# Patient Record
Sex: FEMALE | Race: BLACK OR AFRICAN AMERICAN | Employment: OTHER | URBAN - METROPOLITAN AREA
[De-identification: names, ages, dates, MRNs, and addresses within clinical notes are randomized per-mention and may not be internally consistent; named-entity substitution may affect disease eponyms.]

---

## 2018-07-13 ENCOUNTER — HOSPITAL ENCOUNTER (OUTPATIENT)
Dept: GENERAL RADIOLOGY | Age: 68
Discharge: HOME | End: 2018-07-13
Attending: FAMILY MEDICINE
Payer: MEDICARE

## 2018-07-13 DIAGNOSIS — R05.9 COUGH: ICD-10-CM

## 2018-07-13 PROCEDURE — 71046 X-RAY EXAM CHEST 2 VIEWS: CPT

## 2020-04-14 ENCOUNTER — OFFICE VISIT (OUTPATIENT)
Dept: ONCOLOGY | Age: 70
End: 2020-04-14

## 2020-04-14 ENCOUNTER — DOCUMENTATION ONLY (OUTPATIENT)
Dept: ONCOLOGY | Age: 70
End: 2020-04-14

## 2020-04-14 VITALS
TEMPERATURE: 95.7 F | DIASTOLIC BLOOD PRESSURE: 69 MMHG | HEIGHT: 67 IN | OXYGEN SATURATION: 97 % | SYSTOLIC BLOOD PRESSURE: 127 MMHG | RESPIRATION RATE: 18 BRPM | HEART RATE: 92 BPM | WEIGHT: 231.4 LBS | BODY MASS INDEX: 36.32 KG/M2

## 2020-04-14 DIAGNOSIS — Z17.0 BILATERAL MALIGNANT NEOPLASM OF BREAST IN FEMALE, ESTROGEN RECEPTOR POSITIVE, UNSPECIFIED SITE OF BREAST (HCC): Primary | ICD-10-CM

## 2020-04-14 DIAGNOSIS — Z01.810 ENCOUNTER FOR PREPROCEDURAL CARDIOVASCULAR EXAMINATION: ICD-10-CM

## 2020-04-14 DIAGNOSIS — C50.912 BILATERAL MALIGNANT NEOPLASM OF BREAST IN FEMALE, ESTROGEN RECEPTOR POSITIVE, UNSPECIFIED SITE OF BREAST (HCC): Primary | ICD-10-CM

## 2020-04-14 DIAGNOSIS — C50.911 BILATERAL MALIGNANT NEOPLASM OF BREAST IN FEMALE, ESTROGEN RECEPTOR POSITIVE, UNSPECIFIED SITE OF BREAST (HCC): Primary | ICD-10-CM

## 2020-04-14 RX ORDER — ONDANSETRON 2 MG/ML
8 INJECTION INTRAMUSCULAR; INTRAVENOUS AS NEEDED
Status: CANCELLED | OUTPATIENT
Start: 2020-06-16

## 2020-04-14 RX ORDER — HEPARIN 100 UNIT/ML
300-500 SYRINGE INTRAVENOUS AS NEEDED
Status: CANCELLED
Start: 2020-06-02

## 2020-04-14 RX ORDER — DIPHENHYDRAMINE HYDROCHLORIDE 50 MG/ML
50 INJECTION, SOLUTION INTRAMUSCULAR; INTRAVENOUS AS NEEDED
Status: CANCELLED
Start: 2020-05-12

## 2020-04-14 RX ORDER — LIDOCAINE AND PRILOCAINE 25; 25 MG/G; MG/G
CREAM TOPICAL AS NEEDED
Qty: 30 G | Refills: 0 | Status: SHIPPED | OUTPATIENT
Start: 2020-04-14 | End: 2022-04-19

## 2020-04-14 RX ORDER — ONDANSETRON 2 MG/ML
8 INJECTION INTRAMUSCULAR; INTRAVENOUS AS NEEDED
Status: CANCELLED | OUTPATIENT
Start: 2020-07-07

## 2020-04-14 RX ORDER — ALBUTEROL SULFATE 0.83 MG/ML
2.5 SOLUTION RESPIRATORY (INHALATION) AS NEEDED
Status: CANCELLED
Start: 2020-05-12

## 2020-04-14 RX ORDER — SODIUM CHLORIDE 9 MG/ML
10 INJECTION INTRAMUSCULAR; INTRAVENOUS; SUBCUTANEOUS AS NEEDED
Status: CANCELLED | OUTPATIENT
Start: 2020-06-09

## 2020-04-14 RX ORDER — ACETAMINOPHEN 325 MG/1
650 TABLET ORAL AS NEEDED
Status: CANCELLED
Start: 2020-06-09

## 2020-04-14 RX ORDER — ALBUTEROL SULFATE 0.83 MG/ML
2.5 SOLUTION RESPIRATORY (INHALATION) AS NEEDED
Status: CANCELLED
Start: 2020-07-07

## 2020-04-14 RX ORDER — SODIUM CHLORIDE 9 MG/ML
10 INJECTION INTRAMUSCULAR; INTRAVENOUS; SUBCUTANEOUS AS NEEDED
Status: CANCELLED | OUTPATIENT
Start: 2020-04-21

## 2020-04-14 RX ORDER — EPINEPHRINE 1 MG/ML
0.3 INJECTION, SOLUTION, CONCENTRATE INTRAVENOUS AS NEEDED
Status: CANCELLED | OUTPATIENT
Start: 2020-06-09

## 2020-04-14 RX ORDER — SODIUM CHLORIDE 0.9 % (FLUSH) 0.9 %
10 SYRINGE (ML) INJECTION AS NEEDED
Status: CANCELLED
Start: 2020-05-05

## 2020-04-14 RX ORDER — DIPHENHYDRAMINE HYDROCHLORIDE 50 MG/ML
50 INJECTION, SOLUTION INTRAMUSCULAR; INTRAVENOUS AS NEEDED
Status: CANCELLED
Start: 2020-05-26

## 2020-04-14 RX ORDER — DEXAMETHASONE SODIUM PHOSPHATE 4 MG/ML
10 INJECTION, SOLUTION INTRA-ARTICULAR; INTRALESIONAL; INTRAMUSCULAR; INTRAVENOUS; SOFT TISSUE ONCE
Status: CANCELLED | OUTPATIENT
Start: 2020-05-26

## 2020-04-14 RX ORDER — DEXAMETHASONE SODIUM PHOSPHATE 4 MG/ML
10 INJECTION, SOLUTION INTRA-ARTICULAR; INTRALESIONAL; INTRAMUSCULAR; INTRAVENOUS; SOFT TISSUE ONCE
Status: CANCELLED | OUTPATIENT
Start: 2020-05-05

## 2020-04-14 RX ORDER — HYDROCORTISONE SODIUM SUCCINATE 100 MG/2ML
100 INJECTION, POWDER, FOR SOLUTION INTRAMUSCULAR; INTRAVENOUS AS NEEDED
Status: CANCELLED | OUTPATIENT
Start: 2020-05-19

## 2020-04-14 RX ORDER — DIPHENHYDRAMINE HYDROCHLORIDE 50 MG/ML
50 INJECTION, SOLUTION INTRAMUSCULAR; INTRAVENOUS ONCE
Status: CANCELLED
Start: 2020-06-23

## 2020-04-14 RX ORDER — ACETAMINOPHEN 325 MG/1
650 TABLET ORAL AS NEEDED
Status: CANCELLED
Start: 2020-05-19

## 2020-04-14 RX ORDER — HEPARIN 100 UNIT/ML
300-500 SYRINGE INTRAVENOUS AS NEEDED
Status: CANCELLED
Start: 2020-05-12

## 2020-04-14 RX ORDER — DIPHENHYDRAMINE HYDROCHLORIDE 50 MG/ML
50 INJECTION, SOLUTION INTRAMUSCULAR; INTRAVENOUS ONCE
Status: CANCELLED
Start: 2020-04-21

## 2020-04-14 RX ORDER — ONDANSETRON HYDROCHLORIDE 8 MG/1
8 TABLET, FILM COATED ORAL
Qty: 24 TAB | Refills: 3 | Status: SHIPPED | OUTPATIENT
Start: 2020-04-14 | End: 2020-09-15

## 2020-04-14 RX ORDER — EPINEPHRINE 1 MG/ML
0.3 INJECTION, SOLUTION, CONCENTRATE INTRAVENOUS AS NEEDED
Status: CANCELLED | OUTPATIENT
Start: 2020-04-28

## 2020-04-14 RX ORDER — HEPARIN 100 UNIT/ML
300-500 SYRINGE INTRAVENOUS AS NEEDED
Status: CANCELLED
Start: 2020-06-30

## 2020-04-14 RX ORDER — ALBUTEROL SULFATE 0.83 MG/ML
2.5 SOLUTION RESPIRATORY (INHALATION) AS NEEDED
Status: CANCELLED
Start: 2020-06-09

## 2020-04-14 RX ORDER — EPINEPHRINE 1 MG/ML
0.3 INJECTION, SOLUTION, CONCENTRATE INTRAVENOUS AS NEEDED
Status: CANCELLED | OUTPATIENT
Start: 2020-04-21

## 2020-04-14 RX ORDER — SODIUM CHLORIDE 0.9 % (FLUSH) 0.9 %
10 SYRINGE (ML) INJECTION AS NEEDED
Status: CANCELLED
Start: 2020-06-23

## 2020-04-14 RX ORDER — DIPHENHYDRAMINE HYDROCHLORIDE 50 MG/ML
50 INJECTION, SOLUTION INTRAMUSCULAR; INTRAVENOUS ONCE
Status: CANCELLED
Start: 2020-05-26

## 2020-04-14 RX ORDER — DIPHENHYDRAMINE HYDROCHLORIDE 50 MG/ML
25 INJECTION, SOLUTION INTRAMUSCULAR; INTRAVENOUS AS NEEDED
Status: CANCELLED
Start: 2020-06-23

## 2020-04-14 RX ORDER — SODIUM CHLORIDE 0.9 % (FLUSH) 0.9 %
10 SYRINGE (ML) INJECTION AS NEEDED
Status: CANCELLED
Start: 2020-06-16

## 2020-04-14 RX ORDER — ALBUTEROL SULFATE 0.83 MG/ML
2.5 SOLUTION RESPIRATORY (INHALATION) AS NEEDED
Status: CANCELLED
Start: 2020-05-26

## 2020-04-14 RX ORDER — DIPHENHYDRAMINE HYDROCHLORIDE 50 MG/ML
50 INJECTION, SOLUTION INTRAMUSCULAR; INTRAVENOUS AS NEEDED
Status: CANCELLED
Start: 2020-04-28

## 2020-04-14 RX ORDER — ONDANSETRON 2 MG/ML
8 INJECTION INTRAMUSCULAR; INTRAVENOUS AS NEEDED
Status: CANCELLED | OUTPATIENT
Start: 2020-06-23

## 2020-04-14 RX ORDER — DEXAMETHASONE SODIUM PHOSPHATE 4 MG/ML
10 INJECTION, SOLUTION INTRA-ARTICULAR; INTRALESIONAL; INTRAMUSCULAR; INTRAVENOUS; SOFT TISSUE ONCE
Status: CANCELLED | OUTPATIENT
Start: 2020-05-19

## 2020-04-14 RX ORDER — HEPARIN 100 UNIT/ML
300-500 SYRINGE INTRAVENOUS AS NEEDED
Status: CANCELLED
Start: 2020-05-26

## 2020-04-14 RX ORDER — DEXAMETHASONE SODIUM PHOSPHATE 4 MG/ML
10 INJECTION, SOLUTION INTRA-ARTICULAR; INTRALESIONAL; INTRAMUSCULAR; INTRAVENOUS; SOFT TISSUE ONCE
Status: CANCELLED | OUTPATIENT
Start: 2020-04-28

## 2020-04-14 RX ORDER — DEXAMETHASONE SODIUM PHOSPHATE 4 MG/ML
10 INJECTION, SOLUTION INTRA-ARTICULAR; INTRALESIONAL; INTRAMUSCULAR; INTRAVENOUS; SOFT TISSUE ONCE
Status: CANCELLED | OUTPATIENT
Start: 2020-06-16

## 2020-04-14 RX ORDER — EPINEPHRINE 1 MG/ML
0.3 INJECTION, SOLUTION, CONCENTRATE INTRAVENOUS AS NEEDED
Status: CANCELLED | OUTPATIENT
Start: 2020-06-30

## 2020-04-14 RX ORDER — DIPHENHYDRAMINE HYDROCHLORIDE 50 MG/ML
50 INJECTION, SOLUTION INTRAMUSCULAR; INTRAVENOUS AS NEEDED
Status: CANCELLED
Start: 2020-05-19

## 2020-04-14 RX ORDER — SODIUM CHLORIDE 9 MG/ML
10 INJECTION INTRAMUSCULAR; INTRAVENOUS; SUBCUTANEOUS AS NEEDED
Status: CANCELLED | OUTPATIENT
Start: 2020-05-05

## 2020-04-14 RX ORDER — DIPHENHYDRAMINE HYDROCHLORIDE 50 MG/ML
25 INJECTION, SOLUTION INTRAMUSCULAR; INTRAVENOUS AS NEEDED
Status: CANCELLED
Start: 2020-06-09

## 2020-04-14 RX ORDER — DIPHENHYDRAMINE HYDROCHLORIDE 50 MG/ML
50 INJECTION, SOLUTION INTRAMUSCULAR; INTRAVENOUS AS NEEDED
Status: CANCELLED
Start: 2020-05-05

## 2020-04-14 RX ORDER — SODIUM CHLORIDE 9 MG/ML
10 INJECTION INTRAMUSCULAR; INTRAVENOUS; SUBCUTANEOUS AS NEEDED
Status: CANCELLED | OUTPATIENT
Start: 2020-07-07

## 2020-04-14 RX ORDER — ACETAMINOPHEN 325 MG/1
650 TABLET ORAL AS NEEDED
Status: CANCELLED
Start: 2020-06-16

## 2020-04-14 RX ORDER — PROCHLORPERAZINE MALEATE 10 MG
10 TABLET ORAL
Qty: 50 TAB | Refills: 5 | Status: SHIPPED | OUTPATIENT
Start: 2020-04-14 | End: 2020-09-15

## 2020-04-14 RX ORDER — OMEPRAZOLE 20 MG/1
CAPSULE, DELAYED RELEASE ORAL
COMMUNITY
Start: 2020-02-21

## 2020-04-14 RX ORDER — ACETAMINOPHEN 325 MG/1
650 TABLET ORAL AS NEEDED
Status: CANCELLED
Start: 2020-07-07

## 2020-04-14 RX ORDER — ALBUTEROL SULFATE 0.83 MG/ML
2.5 SOLUTION RESPIRATORY (INHALATION) AS NEEDED
Status: CANCELLED
Start: 2020-04-28

## 2020-04-14 RX ORDER — ACETAMINOPHEN 325 MG/1
650 TABLET ORAL AS NEEDED
Status: CANCELLED
Start: 2020-06-02

## 2020-04-14 RX ORDER — DIPHENHYDRAMINE HYDROCHLORIDE 50 MG/ML
50 INJECTION, SOLUTION INTRAMUSCULAR; INTRAVENOUS AS NEEDED
Status: CANCELLED
Start: 2020-06-02

## 2020-04-14 RX ORDER — EPINEPHRINE 1 MG/ML
0.3 INJECTION, SOLUTION, CONCENTRATE INTRAVENOUS AS NEEDED
Status: CANCELLED | OUTPATIENT
Start: 2020-05-12

## 2020-04-14 RX ORDER — SODIUM CHLORIDE 9 MG/ML
10 INJECTION INTRAMUSCULAR; INTRAVENOUS; SUBCUTANEOUS AS NEEDED
Status: CANCELLED | OUTPATIENT
Start: 2020-05-26

## 2020-04-14 RX ORDER — ALBUTEROL SULFATE 0.83 MG/ML
2.5 SOLUTION RESPIRATORY (INHALATION) AS NEEDED
Status: CANCELLED
Start: 2020-06-30

## 2020-04-14 RX ORDER — SODIUM CHLORIDE 9 MG/ML
25 INJECTION, SOLUTION INTRAVENOUS CONTINUOUS
Status: CANCELLED | OUTPATIENT
Start: 2020-04-28

## 2020-04-14 RX ORDER — HYDROCORTISONE SODIUM SUCCINATE 100 MG/2ML
100 INJECTION, POWDER, FOR SOLUTION INTRAMUSCULAR; INTRAVENOUS AS NEEDED
Status: CANCELLED | OUTPATIENT
Start: 2020-04-21

## 2020-04-14 RX ORDER — DIPHENHYDRAMINE HYDROCHLORIDE 50 MG/ML
25 INJECTION, SOLUTION INTRAMUSCULAR; INTRAVENOUS AS NEEDED
Status: CANCELLED
Start: 2020-06-02

## 2020-04-14 RX ORDER — EPINEPHRINE 1 MG/ML
0.3 INJECTION, SOLUTION, CONCENTRATE INTRAVENOUS AS NEEDED
Status: CANCELLED | OUTPATIENT
Start: 2020-06-02

## 2020-04-14 RX ORDER — DIPHENHYDRAMINE HYDROCHLORIDE 50 MG/ML
25 INJECTION, SOLUTION INTRAMUSCULAR; INTRAVENOUS AS NEEDED
Status: CANCELLED
Start: 2020-06-16

## 2020-04-14 RX ORDER — DEXAMETHASONE SODIUM PHOSPHATE 4 MG/ML
10 INJECTION, SOLUTION INTRA-ARTICULAR; INTRALESIONAL; INTRAMUSCULAR; INTRAVENOUS; SOFT TISSUE ONCE
Status: CANCELLED | OUTPATIENT
Start: 2020-05-12

## 2020-04-14 RX ORDER — SODIUM CHLORIDE 9 MG/ML
10 INJECTION INTRAMUSCULAR; INTRAVENOUS; SUBCUTANEOUS AS NEEDED
Status: CANCELLED | OUTPATIENT
Start: 2020-06-02

## 2020-04-14 RX ORDER — SODIUM CHLORIDE 9 MG/ML
25 INJECTION, SOLUTION INTRAVENOUS CONTINUOUS
Status: CANCELLED | OUTPATIENT
Start: 2020-07-07

## 2020-04-14 RX ORDER — DEXAMETHASONE SODIUM PHOSPHATE 4 MG/ML
10 INJECTION, SOLUTION INTRA-ARTICULAR; INTRALESIONAL; INTRAMUSCULAR; INTRAVENOUS; SOFT TISSUE ONCE
Status: CANCELLED | OUTPATIENT
Start: 2020-06-09

## 2020-04-14 RX ORDER — DIPHENHYDRAMINE HYDROCHLORIDE 50 MG/ML
50 INJECTION, SOLUTION INTRAMUSCULAR; INTRAVENOUS ONCE
Status: CANCELLED
Start: 2020-06-16

## 2020-04-14 RX ORDER — DIPHENHYDRAMINE HYDROCHLORIDE 50 MG/ML
25 INJECTION, SOLUTION INTRAMUSCULAR; INTRAVENOUS AS NEEDED
Status: CANCELLED
Start: 2020-05-05

## 2020-04-14 RX ORDER — ACETAMINOPHEN 325 MG/1
650 TABLET ORAL AS NEEDED
Status: CANCELLED
Start: 2020-05-05

## 2020-04-14 RX ORDER — SODIUM CHLORIDE 9 MG/ML
25 INJECTION, SOLUTION INTRAVENOUS CONTINUOUS
Status: CANCELLED | OUTPATIENT
Start: 2020-05-05

## 2020-04-14 RX ORDER — ROSUVASTATIN CALCIUM 20 MG/1
TABLET, COATED ORAL
COMMUNITY
Start: 2020-02-17

## 2020-04-14 RX ORDER — SODIUM CHLORIDE 9 MG/ML
10 INJECTION INTRAMUSCULAR; INTRAVENOUS; SUBCUTANEOUS AS NEEDED
Status: CANCELLED | OUTPATIENT
Start: 2020-06-30

## 2020-04-14 RX ORDER — HEPARIN 100 UNIT/ML
300-500 SYRINGE INTRAVENOUS AS NEEDED
Status: CANCELLED
Start: 2020-06-16

## 2020-04-14 RX ORDER — DIPHENHYDRAMINE HYDROCHLORIDE 50 MG/ML
50 INJECTION, SOLUTION INTRAMUSCULAR; INTRAVENOUS ONCE
Status: CANCELLED
Start: 2020-05-12

## 2020-04-14 RX ORDER — HEPARIN 100 UNIT/ML
300-500 SYRINGE INTRAVENOUS AS NEEDED
Status: CANCELLED
Start: 2020-05-19

## 2020-04-14 RX ORDER — SODIUM CHLORIDE 9 MG/ML
25 INJECTION, SOLUTION INTRAVENOUS CONTINUOUS
Status: CANCELLED | OUTPATIENT
Start: 2020-05-12

## 2020-04-14 RX ORDER — DIPHENHYDRAMINE HYDROCHLORIDE 50 MG/ML
50 INJECTION, SOLUTION INTRAMUSCULAR; INTRAVENOUS AS NEEDED
Status: CANCELLED
Start: 2020-07-07

## 2020-04-14 RX ORDER — ALBUTEROL SULFATE 0.83 MG/ML
2.5 SOLUTION RESPIRATORY (INHALATION) AS NEEDED
Status: CANCELLED
Start: 2020-06-23

## 2020-04-14 RX ORDER — DIPHENHYDRAMINE HYDROCHLORIDE 50 MG/ML
25 INJECTION, SOLUTION INTRAMUSCULAR; INTRAVENOUS AS NEEDED
Status: CANCELLED
Start: 2020-06-30

## 2020-04-14 RX ORDER — ACETAMINOPHEN 325 MG/1
650 TABLET ORAL AS NEEDED
Status: CANCELLED
Start: 2020-06-23

## 2020-04-14 RX ORDER — SODIUM CHLORIDE 0.9 % (FLUSH) 0.9 %
10 SYRINGE (ML) INJECTION AS NEEDED
Status: CANCELLED
Start: 2020-04-28

## 2020-04-14 RX ORDER — SODIUM CHLORIDE 9 MG/ML
25 INJECTION, SOLUTION INTRAVENOUS CONTINUOUS
Status: CANCELLED | OUTPATIENT
Start: 2020-06-02

## 2020-04-14 RX ORDER — ACETAMINOPHEN 325 MG/1
650 TABLET ORAL AS NEEDED
Status: CANCELLED
Start: 2020-06-30

## 2020-04-14 RX ORDER — ALBUTEROL SULFATE 0.83 MG/ML
2.5 SOLUTION RESPIRATORY (INHALATION) AS NEEDED
Status: CANCELLED
Start: 2020-04-21

## 2020-04-14 RX ORDER — SODIUM CHLORIDE 9 MG/ML
10 INJECTION INTRAMUSCULAR; INTRAVENOUS; SUBCUTANEOUS AS NEEDED
Status: CANCELLED | OUTPATIENT
Start: 2020-06-16

## 2020-04-14 RX ORDER — ACETAMINOPHEN 325 MG/1
650 TABLET ORAL AS NEEDED
Status: CANCELLED
Start: 2020-05-26

## 2020-04-14 RX ORDER — SODIUM CHLORIDE 9 MG/ML
10 INJECTION INTRAMUSCULAR; INTRAVENOUS; SUBCUTANEOUS AS NEEDED
Status: CANCELLED | OUTPATIENT
Start: 2020-05-19

## 2020-04-14 RX ORDER — DEXAMETHASONE SODIUM PHOSPHATE 4 MG/ML
10 INJECTION, SOLUTION INTRA-ARTICULAR; INTRALESIONAL; INTRAMUSCULAR; INTRAVENOUS; SOFT TISSUE ONCE
Status: CANCELLED | OUTPATIENT
Start: 2020-06-30

## 2020-04-14 RX ORDER — HYDROCORTISONE SODIUM SUCCINATE 100 MG/2ML
100 INJECTION, POWDER, FOR SOLUTION INTRAMUSCULAR; INTRAVENOUS AS NEEDED
Status: CANCELLED | OUTPATIENT
Start: 2020-05-26

## 2020-04-14 RX ORDER — DIPHENHYDRAMINE HYDROCHLORIDE 50 MG/ML
25 INJECTION, SOLUTION INTRAMUSCULAR; INTRAVENOUS AS NEEDED
Status: CANCELLED
Start: 2020-04-28

## 2020-04-14 RX ORDER — HEPARIN 100 UNIT/ML
300-500 SYRINGE INTRAVENOUS AS NEEDED
Status: CANCELLED
Start: 2020-05-05

## 2020-04-14 RX ORDER — SODIUM CHLORIDE 9 MG/ML
10 INJECTION INTRAMUSCULAR; INTRAVENOUS; SUBCUTANEOUS AS NEEDED
Status: CANCELLED | OUTPATIENT
Start: 2020-06-23

## 2020-04-14 RX ORDER — DIPHENHYDRAMINE HYDROCHLORIDE 50 MG/ML
50 INJECTION, SOLUTION INTRAMUSCULAR; INTRAVENOUS ONCE
Status: CANCELLED
Start: 2020-07-07

## 2020-04-14 RX ORDER — DEXAMETHASONE SODIUM PHOSPHATE 4 MG/ML
10 INJECTION, SOLUTION INTRA-ARTICULAR; INTRALESIONAL; INTRAMUSCULAR; INTRAVENOUS; SOFT TISSUE ONCE
Status: CANCELLED | OUTPATIENT
Start: 2020-06-23

## 2020-04-14 RX ORDER — EPINEPHRINE 1 MG/ML
0.3 INJECTION, SOLUTION, CONCENTRATE INTRAVENOUS AS NEEDED
Status: CANCELLED | OUTPATIENT
Start: 2020-05-05

## 2020-04-14 RX ORDER — EPINEPHRINE 1 MG/ML
0.3 INJECTION, SOLUTION, CONCENTRATE INTRAVENOUS AS NEEDED
Status: CANCELLED | OUTPATIENT
Start: 2020-05-26

## 2020-04-14 RX ORDER — EPINEPHRINE 1 MG/ML
0.3 INJECTION, SOLUTION, CONCENTRATE INTRAVENOUS AS NEEDED
Status: CANCELLED | OUTPATIENT
Start: 2020-07-07

## 2020-04-14 RX ORDER — DIPHENHYDRAMINE HYDROCHLORIDE 50 MG/ML
50 INJECTION, SOLUTION INTRAMUSCULAR; INTRAVENOUS AS NEEDED
Status: CANCELLED
Start: 2020-04-21

## 2020-04-14 RX ORDER — DIPHENHYDRAMINE HYDROCHLORIDE 50 MG/ML
50 INJECTION, SOLUTION INTRAMUSCULAR; INTRAVENOUS AS NEEDED
Status: CANCELLED
Start: 2020-06-30

## 2020-04-14 RX ORDER — DIPHENHYDRAMINE HYDROCHLORIDE 50 MG/ML
50 INJECTION, SOLUTION INTRAMUSCULAR; INTRAVENOUS ONCE
Status: CANCELLED
Start: 2020-06-30

## 2020-04-14 RX ORDER — EPINEPHRINE 1 MG/ML
0.3 INJECTION, SOLUTION, CONCENTRATE INTRAVENOUS AS NEEDED
Status: CANCELLED | OUTPATIENT
Start: 2020-06-23

## 2020-04-14 RX ORDER — DIPHENHYDRAMINE HYDROCHLORIDE 50 MG/ML
50 INJECTION, SOLUTION INTRAMUSCULAR; INTRAVENOUS ONCE
Status: CANCELLED
Start: 2020-05-05

## 2020-04-14 RX ORDER — SODIUM CHLORIDE 0.9 % (FLUSH) 0.9 %
10 SYRINGE (ML) INJECTION AS NEEDED
Status: CANCELLED
Start: 2020-06-30

## 2020-04-14 RX ORDER — ONDANSETRON 2 MG/ML
8 INJECTION INTRAMUSCULAR; INTRAVENOUS AS NEEDED
Status: CANCELLED | OUTPATIENT
Start: 2020-05-12

## 2020-04-14 RX ORDER — ONDANSETRON 2 MG/ML
8 INJECTION INTRAMUSCULAR; INTRAVENOUS AS NEEDED
Status: CANCELLED | OUTPATIENT
Start: 2020-06-02

## 2020-04-14 RX ORDER — SODIUM CHLORIDE 9 MG/ML
10 INJECTION INTRAMUSCULAR; INTRAVENOUS; SUBCUTANEOUS AS NEEDED
Status: CANCELLED | OUTPATIENT
Start: 2020-04-28

## 2020-04-14 RX ORDER — DIPHENHYDRAMINE HYDROCHLORIDE 50 MG/ML
50 INJECTION, SOLUTION INTRAMUSCULAR; INTRAVENOUS ONCE
Status: CANCELLED
Start: 2020-04-28

## 2020-04-14 RX ORDER — SODIUM CHLORIDE 0.9 % (FLUSH) 0.9 %
10 SYRINGE (ML) INJECTION AS NEEDED
Status: CANCELLED
Start: 2020-05-26

## 2020-04-14 RX ORDER — HEPARIN 100 UNIT/ML
300-500 SYRINGE INTRAVENOUS AS NEEDED
Status: CANCELLED
Start: 2020-04-21

## 2020-04-14 RX ORDER — HEPARIN 100 UNIT/ML
300-500 SYRINGE INTRAVENOUS AS NEEDED
Status: CANCELLED
Start: 2020-07-07

## 2020-04-14 RX ORDER — HEPARIN 100 UNIT/ML
300-500 SYRINGE INTRAVENOUS AS NEEDED
Status: CANCELLED
Start: 2020-04-28

## 2020-04-14 RX ORDER — ONDANSETRON 2 MG/ML
8 INJECTION INTRAMUSCULAR; INTRAVENOUS AS NEEDED
Status: CANCELLED | OUTPATIENT
Start: 2020-04-21

## 2020-04-14 RX ORDER — SODIUM CHLORIDE 0.9 % (FLUSH) 0.9 %
10 SYRINGE (ML) INJECTION AS NEEDED
Status: CANCELLED
Start: 2020-06-02

## 2020-04-14 RX ORDER — SODIUM CHLORIDE 9 MG/ML
25 INJECTION, SOLUTION INTRAVENOUS CONTINUOUS
Status: CANCELLED | OUTPATIENT
Start: 2020-06-23

## 2020-04-14 RX ORDER — ERGOCALCIFEROL 1.25 MG/1
CAPSULE ORAL
COMMUNITY
Start: 2019-01-10

## 2020-04-14 RX ORDER — HYDROCORTISONE SODIUM SUCCINATE 100 MG/2ML
100 INJECTION, POWDER, FOR SOLUTION INTRAMUSCULAR; INTRAVENOUS AS NEEDED
Status: CANCELLED | OUTPATIENT
Start: 2020-06-23

## 2020-04-14 RX ORDER — ACETAMINOPHEN 325 MG/1
650 TABLET ORAL AS NEEDED
Status: CANCELLED
Start: 2020-04-21

## 2020-04-14 RX ORDER — DIPHENHYDRAMINE HYDROCHLORIDE 50 MG/ML
25 INJECTION, SOLUTION INTRAMUSCULAR; INTRAVENOUS AS NEEDED
Status: CANCELLED
Start: 2020-04-21

## 2020-04-14 RX ORDER — ONDANSETRON 2 MG/ML
8 INJECTION INTRAMUSCULAR; INTRAVENOUS AS NEEDED
Status: CANCELLED | OUTPATIENT
Start: 2020-05-19

## 2020-04-14 RX ORDER — HYDROCORTISONE SODIUM SUCCINATE 100 MG/2ML
100 INJECTION, POWDER, FOR SOLUTION INTRAMUSCULAR; INTRAVENOUS AS NEEDED
Status: CANCELLED | OUTPATIENT
Start: 2020-05-12

## 2020-04-14 RX ORDER — HYDROCORTISONE SODIUM SUCCINATE 100 MG/2ML
100 INJECTION, POWDER, FOR SOLUTION INTRAMUSCULAR; INTRAVENOUS AS NEEDED
Status: CANCELLED | OUTPATIENT
Start: 2020-06-30

## 2020-04-14 RX ORDER — DEXAMETHASONE SODIUM PHOSPHATE 4 MG/ML
10 INJECTION, SOLUTION INTRA-ARTICULAR; INTRALESIONAL; INTRAMUSCULAR; INTRAVENOUS; SOFT TISSUE ONCE
Status: CANCELLED | OUTPATIENT
Start: 2020-04-21

## 2020-04-14 RX ORDER — EPINEPHRINE 1 MG/ML
0.3 INJECTION, SOLUTION, CONCENTRATE INTRAVENOUS AS NEEDED
Status: CANCELLED | OUTPATIENT
Start: 2020-06-16

## 2020-04-14 RX ORDER — SODIUM CHLORIDE 0.9 % (FLUSH) 0.9 %
10 SYRINGE (ML) INJECTION AS NEEDED
Status: CANCELLED
Start: 2020-06-09

## 2020-04-14 RX ORDER — ALBUTEROL SULFATE 0.83 MG/ML
2.5 SOLUTION RESPIRATORY (INHALATION) AS NEEDED
Status: CANCELLED
Start: 2020-06-16

## 2020-04-14 RX ORDER — SODIUM CHLORIDE 9 MG/ML
25 INJECTION, SOLUTION INTRAVENOUS CONTINUOUS
Status: CANCELLED | OUTPATIENT
Start: 2020-05-26

## 2020-04-14 RX ORDER — DEXAMETHASONE SODIUM PHOSPHATE 4 MG/ML
10 INJECTION, SOLUTION INTRA-ARTICULAR; INTRALESIONAL; INTRAMUSCULAR; INTRAVENOUS; SOFT TISSUE ONCE
Status: CANCELLED | OUTPATIENT
Start: 2020-07-07

## 2020-04-14 RX ORDER — SODIUM CHLORIDE 9 MG/ML
25 INJECTION, SOLUTION INTRAVENOUS CONTINUOUS
Status: CANCELLED | OUTPATIENT
Start: 2020-06-16

## 2020-04-14 RX ORDER — ASPIRIN 81 MG/1
TABLET ORAL DAILY
COMMUNITY

## 2020-04-14 RX ORDER — SODIUM CHLORIDE 0.9 % (FLUSH) 0.9 %
10 SYRINGE (ML) INJECTION AS NEEDED
Status: CANCELLED
Start: 2020-05-12

## 2020-04-14 RX ORDER — DIPHENHYDRAMINE HYDROCHLORIDE 50 MG/ML
50 INJECTION, SOLUTION INTRAMUSCULAR; INTRAVENOUS AS NEEDED
Status: CANCELLED
Start: 2020-06-16

## 2020-04-14 RX ORDER — LOSARTAN POTASSIUM 50 MG/1
TABLET ORAL
COMMUNITY
Start: 2020-02-17

## 2020-04-14 RX ORDER — ALBUTEROL SULFATE 0.83 MG/ML
2.5 SOLUTION RESPIRATORY (INHALATION) AS NEEDED
Status: CANCELLED
Start: 2020-05-05

## 2020-04-14 RX ORDER — SODIUM CHLORIDE 0.9 % (FLUSH) 0.9 %
10 SYRINGE (ML) INJECTION AS NEEDED
Status: CANCELLED
Start: 2020-04-21

## 2020-04-14 RX ORDER — HEPARIN 100 UNIT/ML
300-500 SYRINGE INTRAVENOUS AS NEEDED
Status: CANCELLED
Start: 2020-06-23

## 2020-04-14 RX ORDER — ALBUTEROL SULFATE 0.83 MG/ML
2.5 SOLUTION RESPIRATORY (INHALATION) AS NEEDED
Status: CANCELLED
Start: 2020-06-02

## 2020-04-14 RX ORDER — DIPHENHYDRAMINE HYDROCHLORIDE 50 MG/ML
25 INJECTION, SOLUTION INTRAMUSCULAR; INTRAVENOUS AS NEEDED
Status: CANCELLED
Start: 2020-05-19

## 2020-04-14 RX ORDER — DIPHENHYDRAMINE HYDROCHLORIDE 50 MG/ML
50 INJECTION, SOLUTION INTRAMUSCULAR; INTRAVENOUS AS NEEDED
Status: CANCELLED
Start: 2020-06-09

## 2020-04-14 RX ORDER — HYDROCORTISONE SODIUM SUCCINATE 100 MG/2ML
100 INJECTION, POWDER, FOR SOLUTION INTRAMUSCULAR; INTRAVENOUS AS NEEDED
Status: CANCELLED | OUTPATIENT
Start: 2020-06-02

## 2020-04-14 RX ORDER — ONDANSETRON 2 MG/ML
8 INJECTION INTRAMUSCULAR; INTRAVENOUS AS NEEDED
Status: CANCELLED | OUTPATIENT
Start: 2020-06-30

## 2020-04-14 RX ORDER — HYDROCORTISONE SODIUM SUCCINATE 100 MG/2ML
100 INJECTION, POWDER, FOR SOLUTION INTRAMUSCULAR; INTRAVENOUS AS NEEDED
Status: CANCELLED | OUTPATIENT
Start: 2020-06-09

## 2020-04-14 RX ORDER — DIPHENHYDRAMINE HYDROCHLORIDE 50 MG/ML
50 INJECTION, SOLUTION INTRAMUSCULAR; INTRAVENOUS AS NEEDED
Status: CANCELLED
Start: 2020-06-23

## 2020-04-14 RX ORDER — ACETAMINOPHEN 325 MG/1
650 TABLET ORAL AS NEEDED
Status: CANCELLED
Start: 2020-05-12

## 2020-04-14 RX ORDER — SODIUM CHLORIDE 9 MG/ML
25 INJECTION, SOLUTION INTRAVENOUS CONTINUOUS
Status: CANCELLED | OUTPATIENT
Start: 2020-04-21

## 2020-04-14 RX ORDER — DIPHENHYDRAMINE HYDROCHLORIDE 50 MG/ML
25 INJECTION, SOLUTION INTRAMUSCULAR; INTRAVENOUS AS NEEDED
Status: CANCELLED
Start: 2020-05-26

## 2020-04-14 RX ORDER — EPINEPHRINE 1 MG/ML
0.3 INJECTION, SOLUTION, CONCENTRATE INTRAVENOUS AS NEEDED
Status: CANCELLED | OUTPATIENT
Start: 2020-05-19

## 2020-04-14 RX ORDER — SODIUM CHLORIDE 9 MG/ML
25 INJECTION, SOLUTION INTRAVENOUS CONTINUOUS
Status: CANCELLED | OUTPATIENT
Start: 2020-06-30

## 2020-04-14 RX ORDER — HYDROCORTISONE SODIUM SUCCINATE 100 MG/2ML
100 INJECTION, POWDER, FOR SOLUTION INTRAMUSCULAR; INTRAVENOUS AS NEEDED
Status: CANCELLED | OUTPATIENT
Start: 2020-07-07

## 2020-04-14 RX ORDER — ALBUTEROL SULFATE 0.83 MG/ML
2.5 SOLUTION RESPIRATORY (INHALATION) AS NEEDED
Status: CANCELLED
Start: 2020-05-19

## 2020-04-14 RX ORDER — DIPHENHYDRAMINE HYDROCHLORIDE 50 MG/ML
50 INJECTION, SOLUTION INTRAMUSCULAR; INTRAVENOUS ONCE
Status: CANCELLED
Start: 2020-05-19

## 2020-04-14 RX ORDER — HYDROCORTISONE SODIUM SUCCINATE 100 MG/2ML
100 INJECTION, POWDER, FOR SOLUTION INTRAMUSCULAR; INTRAVENOUS AS NEEDED
Status: CANCELLED | OUTPATIENT
Start: 2020-04-28

## 2020-04-14 RX ORDER — ONDANSETRON 2 MG/ML
8 INJECTION INTRAMUSCULAR; INTRAVENOUS AS NEEDED
Status: CANCELLED | OUTPATIENT
Start: 2020-05-26

## 2020-04-14 RX ORDER — SODIUM CHLORIDE 0.9 % (FLUSH) 0.9 %
10 SYRINGE (ML) INJECTION AS NEEDED
Status: CANCELLED
Start: 2020-05-19

## 2020-04-14 RX ORDER — ONDANSETRON 2 MG/ML
8 INJECTION INTRAMUSCULAR; INTRAVENOUS AS NEEDED
Status: CANCELLED | OUTPATIENT
Start: 2020-04-28

## 2020-04-14 RX ORDER — DIPHENHYDRAMINE HYDROCHLORIDE 50 MG/ML
50 INJECTION, SOLUTION INTRAMUSCULAR; INTRAVENOUS ONCE
Status: CANCELLED
Start: 2020-06-02

## 2020-04-14 RX ORDER — HEPARIN 100 UNIT/ML
300-500 SYRINGE INTRAVENOUS AS NEEDED
Status: CANCELLED
Start: 2020-06-09

## 2020-04-14 RX ORDER — SODIUM CHLORIDE 9 MG/ML
10 INJECTION INTRAMUSCULAR; INTRAVENOUS; SUBCUTANEOUS AS NEEDED
Status: CANCELLED | OUTPATIENT
Start: 2020-05-12

## 2020-04-14 RX ORDER — ONDANSETRON 2 MG/ML
8 INJECTION INTRAMUSCULAR; INTRAVENOUS AS NEEDED
Status: CANCELLED | OUTPATIENT
Start: 2020-06-09

## 2020-04-14 RX ORDER — SODIUM CHLORIDE 9 MG/ML
25 INJECTION, SOLUTION INTRAVENOUS CONTINUOUS
Status: CANCELLED | OUTPATIENT
Start: 2020-06-09

## 2020-04-14 RX ORDER — ACETAMINOPHEN 325 MG/1
650 TABLET ORAL AS NEEDED
Status: CANCELLED
Start: 2020-04-28

## 2020-04-14 RX ORDER — HYDROCORTISONE SODIUM SUCCINATE 100 MG/2ML
100 INJECTION, POWDER, FOR SOLUTION INTRAMUSCULAR; INTRAVENOUS AS NEEDED
Status: CANCELLED | OUTPATIENT
Start: 2020-06-16

## 2020-04-14 RX ORDER — SODIUM CHLORIDE 0.9 % (FLUSH) 0.9 %
10 SYRINGE (ML) INJECTION AS NEEDED
Status: CANCELLED
Start: 2020-07-07

## 2020-04-14 RX ORDER — SODIUM CHLORIDE 9 MG/ML
25 INJECTION, SOLUTION INTRAVENOUS CONTINUOUS
Status: CANCELLED | OUTPATIENT
Start: 2020-05-19

## 2020-04-14 RX ORDER — DIPHENHYDRAMINE HYDROCHLORIDE 50 MG/ML
25 INJECTION, SOLUTION INTRAMUSCULAR; INTRAVENOUS AS NEEDED
Status: CANCELLED
Start: 2020-05-12

## 2020-04-14 RX ORDER — DEXAMETHASONE SODIUM PHOSPHATE 4 MG/ML
10 INJECTION, SOLUTION INTRA-ARTICULAR; INTRALESIONAL; INTRAMUSCULAR; INTRAVENOUS; SOFT TISSUE ONCE
Status: CANCELLED | OUTPATIENT
Start: 2020-06-02

## 2020-04-14 RX ORDER — DIPHENHYDRAMINE HYDROCHLORIDE 50 MG/ML
25 INJECTION, SOLUTION INTRAMUSCULAR; INTRAVENOUS AS NEEDED
Status: CANCELLED
Start: 2020-07-07

## 2020-04-14 RX ORDER — DIPHENHYDRAMINE HYDROCHLORIDE 50 MG/ML
50 INJECTION, SOLUTION INTRAMUSCULAR; INTRAVENOUS ONCE
Status: CANCELLED
Start: 2020-06-09

## 2020-04-14 RX ORDER — ONDANSETRON 2 MG/ML
8 INJECTION INTRAMUSCULAR; INTRAVENOUS AS NEEDED
Status: CANCELLED | OUTPATIENT
Start: 2020-05-05

## 2020-04-14 RX ORDER — HYDROCORTISONE SODIUM SUCCINATE 100 MG/2ML
100 INJECTION, POWDER, FOR SOLUTION INTRAMUSCULAR; INTRAVENOUS AS NEEDED
Status: CANCELLED | OUTPATIENT
Start: 2020-05-05

## 2020-04-14 NOTE — PROGRESS NOTES
4/14/2020 2:38 PM: Provided patient with a chemotherapy education packet including a handout on breast cancer printed from cancer. net, a handout on  Taxol/Herceptin chemotherapy regimen, a handout on common side effects of chemotherapy, and a handout on how to safely handle bodily secretions and waste after chemotherapy. RN reviewed packet with the patient and opportunity was provided for questions and concerns.

## 2020-04-14 NOTE — PATIENT INSTRUCTIONS
Common Side Effects of Chemotherapy Decreased Blood Counts Your blood counts can decrease temporarily due to chemotherapy, they will recover over time. This is an expected side effect that your Doctor will be monitoring. 
- If you experience fevers (temperature >100.4°F), bleeding or unexplained bruising, please call the office right away Risk of Infection Your white blood cells can decrease temporarily due to chemotherapy and can put you at higher risk of infection. Washing hands frequently with soap and avoiding sick contacts can reduce your risk of infection. 
- If you experience fevers (temperature >100.4°F), shaking chills, or any signs of infection, please call the office immediately Anemia Chemotherapy can cause your red blood cells to temporarily decrease; this is an expected side effect that your Doctor will be monitoring. 
- You may experience fatigue if this occurs, please notify the office if you experience bleeding, shortness of breath with minimal exertion or at rest, rapid heartbeat, or feeling as though you may lose consciousness. Hair Loss Chemotherapy can affect your hair follicles and cause you to lose hair. This can occur on your scalp hair but also all over your body including eyebrows and eye lashes Nausea  You have been prescribed nausea medication to take if needed. Please follow the directions given to you by your Doctor. - Please call the office if the medications you have been given are not relieving nausea. Vomiting Make sure you are taking anti-nausea medication as prescribed. Eating small amounts of bland foods frequently can help. - Please call the office right away if you are vomiting more than 4 times per day or are unable to keep down food or fluids Diarrhea Eating small amounts of bland foods frequently can help, increase your fluid intake. It is usually ok to take Imodium for diarrhea. - Please call the office right away if you experience more than 4 episodes of watery diarrhea or if you are feeling dehydrated. Female patients of childbearing age need to avoid pregnancy during chemotherapy. You can reach Medical Oncology at [facility] with further questions or concerns at: [phone]. 
- Calls during normal business hours will reach our office. 
- Calls after hours or on the weekend will reach an answering service and the on-call Oncologist will return your call. Prognosis: Good This is our best current assessment. Cancers respond differently to treatment. Overall prognosis depends on many factors including other conditions, cancer stage, side effects, and other unforeseen events. Goal of therapy: Curative Expected response to treatment:  Very good: Anticipate remission (no sign of cancer) and possible cancer cure Treatment benefits and harms:  We discussed potential short term side effects to include:see handout Long term side effects of treatment:  see handout Quality of life: Quality of life concerns have been addressed. Treatment as outlined is expected to have minimal impact on patients quality of life. Prescrptions: For emla cream, compazine, zofran Echo for heart

## 2020-04-14 NOTE — PROGRESS NOTES
Oncology Navigator  Psychosocial Assessment    Reason for Assessment:    []Depression  []Anxiety  []Caregiver [place]  []Maladaptive Coping with Serious Illness   [x] Social Work Referral [x] Initial Assessment  [] Other     Sources of Information:    [x]Patient  []Family  [x]Staff  [x]Medical Record    Advance Care Planning:  No flowsheet data found.     Mental Status:    [x]Alert  []Lethargic  []Unresponsive   [] Unable to assess   Oriented to:  [x]Person  [x]Place  [x]Time  [x]Situation      Barriers to Learning:    []Language  []Developmental  []Cognitive  []Altered Mental Status  []Visual/Hearing Impairment  []Unable to Read/Write  []Motivational   [x]No Barriers Identified  []Other:    Relationship Status:  []Single  []  []Significant Other/Life Partner  []  []  [x]      Living Circumstances:  []Lives Alone  [x]Family/Significant Other in Household  []Roommates  []Children in the Home  []Paid Caregivers  []Assisted Living Facility/Group Home  []Skilled [address]  []Homeless  []Incarcerated  []Environmental/Care Concerns  []other:    Employment Status:  []Employed Full-time []Employed Part-time []Homemaker [] Disabled  [x] Retired []Other:    Support System:    [x]Strong  []Fair  []Limited    Financial/Legal Concerns:    []Uninsured  []Limited Income/Resources  []Non-Citizen  [x]No Concerns Identified  []Financial POA:    []Other:    Yazidi/Spiritual/Existential:  [x]Strong Sense of Spirituality  [x]Involved in [employer]  []Request  Visit  []Expressing Spiritual/Existential Angst  []No Concerns Identified    Coping with Illness:         Patient: Family/Caregiver:   Understanding and Acceptance of Illness/Prognosis  [x] []   Strong Sense of Resilience [x] []   Self Reflection [x] []   Engaged Support System [x] []   Does not Readily Discuss Illness [] []   Denial of Terminal Status [] []   Anger [] []   Depression [] []   Anxiety/Fear [] []   Bargaining [] [] Recent Diagnosis/Prognosis [x] []   Difficulties with Body Image [] []   Loss of Identity [] []   Excessive Substance Use [] []   Mental Health History [] []   Enmeshed Relationships [] []   History of Loss [] []   Anticipatory Grief [] []   Concern for Complicated Grief [] []   Suicidal Ideation or Plan [] []   Unable to assess [] [x]            Narrative: Patient here for the initial consultation with Dr. Shauna López for the management of breast cancer. Met with patient to introduce social work role and supports. Patient, her daughter and adult grandson live together in their private residence. Patient is retired for the [employer]. She is very active in her [employer]regation and [employer]. Reviewed barriers to care and none identified at this time. She is well supported by practical and emotional needs by family and friends. She denies any feelings of anxiety or depression. She states \"I am a glass half full kind of person. \"     Agreed no ongoing psychosocial needs at this time. Encouraged patient to contact me as needed. Assessment/Action:   1. Patient is actively coping with diagnosis dn treatment and is well supported with practical and emotional needs. 2. Psychosocial support as desired by patient. Plan/Referral:    Thank you,  Francisco Brand LCSW    This note will not be viewable in [employer].

## 2020-04-14 NOTE — PROGRESS NOTES
[facility] at [address], [address] [place] [address]  Boogie Lizbeth: [phone]  F: [phone]      Reason for Visit:   Tabitha Sanchez is a 71 y.o. female who is seen in consultation at the request of Dr. Kyle Bergman for evaluation of therapy for breast cancer. Rad onc:  Dr. Saud Hirsch    Treatment History:   · 1/24/20 right breast mass:  9:00 core bx, IMC, gr 1, 5 mm, ER + at 99%, CA + at 97%, HER 2 negative at IHC 0, ki67 8%; left breast mass: 3:00 core bx, IMC, gr 3,  5 mm, ER + at 98%, CA negative, HER 2 POSITIVE at [facility] 3+  · invitae genetic testing:  BRCA2 and RAD50 VUS  · 3/23/20 right breast lumpectomy:  ILC, 7 mm, gr 1, no LVI, 0/8 LN, pT1b pN0 cMo; left breast lumpectomy:  IDC, 1.1 cm, gr 3, 0/2 LN, DCIS present, no LVI, pT1c pN0 cM0    History of Present Illness: An abnormal mammogram led to the pathology above    FH: No breast or ovarian cancer, no prostate or pancreas cancer    Past Medical History:   Diagnosis Date    Cancer [facility])       History reviewed. No pertinent surgical history. Social History     Tobacco Use    Smoking status: Never Smoker    Smokeless tobacco: Never Used   Substance Use Topics    Alcohol use: Not Currently      History reviewed. No pertinent family history. Current Outpatient Medications   Medication Sig    ergocalciferol (ERGOCALCIFEROL) 1,250 mcg (50,000 unit) capsule TK 1 C PO 1 TIME A WK    losartan (COZAAR) 50 mg tablet TK 1 T PO QD    omeprazole (PRILOSEC) 20 mg capsule TK 1 C PO QD    rosuvastatin (CRESTOR) 20 mg tablet TK 1 T PO QD    aspirin delayed-release 81 mg tablet Take  by mouth daily. No current facility-administered medications for this visit. Not on File     Review of Systems: A complete review of systems was obtained, negative except as described above.     Physical Exam:     Visit Vitals  /69 (BP 1 Location: Right arm, BP Patient Position: Sitting)   Pulse 92   Temp 95.7 °F (35.4 °C) (Temporal)   Resp 18   Ht 5' 7\" (1.702 m)   Wt 231 lb 6.4 oz (105 kg)   SpO2 97%   BMI 36.24 kg/m²     ECOG PS: 0  General: No distress  Eyes: Anicteric sclerae  HENT: Atraumatic  Neck: Supple  Respiratory: Normal respiratory effort  CV: No peripheral edema  GI: nondistended  Skin: No rashes, ecchymoses, or petechiae  Psych: Alert, oriented, appropriate affect, normal judgment/insight      Results:   No results found for: WBC, HGB, HCT, PLT, MCV, ANEU, HGBPOC, HCTPOC, HGBEXT, HCTEXT, PLTEXT, HGBEXT, HCTEXT, PLTEXT  No results found for: NA, K, CL, CO2, GLU, BUN, CREA, GFRAA, GFRNA, CA, NAPOC, KPOCT, CLPOC, GLUCPOC, IBUN, CREAPOC, ICAI  No results found for: TBILI, ALT, SGOT, AP, TP, ALB, GLOB    2/11/20 MRI breast  Left:  11 mm mass at 3:00  Right:  9:00, 5 mm mass  No LAD    Records reviewed and summarized above. Pathology report(s) reviewed above. Radiology report(s) reviewed above. Assessment/plan:   1. Left breast IDC, 1.1 cm, 0/2 LN, ER +, GA negative, HER 2 POSITIVE, gr 3:  Stage IA (both anatomic and prognostic)    We explained to the patient that the goal of systemic adjuvant therapy is to improve the chances for cure and decrease the risk of relapse. We explained why a patient can have microscopic cancer spread now even though physical examination, laboratory studies and imaging studies are negative for cancer. We explained that the same treatments used now as adjuvant or preventive treatments rarely if ever are curative in women who develop metastases. Using eprognosis. org, her 5 year all cause mortality risk is only 6-8%; her 10 year all cause mortality risk is 15-23%; her median OS is 17-21 years. An adjuvant conversation is warranted. Discussed weekly paclitaxel 80 mg/m2 with weekly trastuzumab 4 mg/kg load then 2 mg/kg weekly x 12 followed by completion of a year of trastuzumab 6 mg/kg q 3 weeks. This is APT study. Also discussed the Atempt trial for T-DM1 q 3 weeks x 17.   DFS was 97.7% for this, but there was more discontinuation vs. TH as above. I discussed the potential risks of paclitaxel chemotherapy with the patient. Major toxicities include nausea and vomiting, stomatitis, fatigue. Anemia frequently results. Neutropenic fever is uncommon, but can be a life-threatening problem. We provided the patient with detailed information concerning toxicity including frequent toxicities that only last a few days, such as nausea, vomiting, mouth sores, arthralgia, myalgia, and potentially allergic reactions to paclitaxel, as well as toxicities which can be longer lasting including fatigue, anemia and neuropathy. We provided the patient with detailed information concerning the toxicities of their regimen in addition to our verbal discussion. Rationale for therapy with trastuzumab was also discussed with the patient including a 50% proportional improvement in disease free survival and also an improvement in overall survival in patients receiving trastuzumab and chemotherapy for HER-2 positive breast cancer. The side effects of trastuzumab were discussed including a 4%-5% risk of dropping her ejection fraction while on treatment and about a 1% risk of CHF. We discussed that this drug will be used every 3 weeks for remainder of a year following the chemotherapy cycles. We will check her EF before chemotherapy and every 3 months while she is receiving trastuzumab. Side effects of T-DM1 discussed including headache, neuropathy, nausea, mouth sores, LFT increase, thrombocytopenia, pain, rare risk of cardiac dysfunction. After this discussion, she is agreeable to paclitaxel and trastuzumab as above. She will sign informed consent at the next visit. TTE ordered. Plan to start on 4/22/20    Discussed oral and peripheral cryotherapy. She is not interested in cold caps    Dr. Rafi Gutiérrez to place port on 4/15/20.     rx for zofran, compazine, emla cream    Following chemotherapy, she is an excellent candidate for radiation and endocrine therapy    2. Right breast ILC, 7 mm, ER +, NC +, HER 2 negative, gr 1, 0/8 LN, stage IA both anatomic and prognostic    We explained to the patient that the goal of systemic adjuvant therapy is to improve the chances for cure and decrease the risk of relapse. We explained why a patient can have microscopic cancer spread now even though physical examination, laboratory studies and imaging studies are negative for cancer. We explained that the same treatments used now as adjuvant or preventive treatments rarely if ever are curative in women who develop metastases. Recommend endocrine therapy, see #1 above. While she does not quite meet the criteria on this side for CALGB 9343 (76 yo instead of 70), she does meed KIT guidelines for possibly avoiding XRT on this side, though would defer to Dr. Becca Finch since XRT is indicated on the Left due to HER2 + disease. 3. Emotional well being:  She has excellent support and is coping well with her disease    4. BRCA2 and RAD50 VUS:  Not clinically significant at this time    > 60 min were spent with this patient with > 50% of that time spent in face to face counseling. I appreciate the opportunity to participate in Ms. Consuelo Rocha care. Signed By: Roslyn Sherwood MD      No orders of the defined types were placed in this encounter.

## 2020-04-21 ENCOUNTER — OFFICE VISIT (OUTPATIENT)
Dept: ONCOLOGY | Age: 70
End: 2020-04-21

## 2020-04-21 ENCOUNTER — HOSPITAL ENCOUNTER (OUTPATIENT)
Dept: INFUSION THERAPY | Age: 70
Discharge: HOME | End: 2020-04-21
Payer: MEDICARE

## 2020-04-21 VITALS
OXYGEN SATURATION: 97 % | SYSTOLIC BLOOD PRESSURE: 136 MMHG | RESPIRATION RATE: 16 BRPM | DIASTOLIC BLOOD PRESSURE: 68 MMHG | HEIGHT: 67 IN | BODY MASS INDEX: 36.26 KG/M2 | WEIGHT: 231 LBS | HEART RATE: 70 BPM | TEMPERATURE: 98.6 F

## 2020-04-21 VITALS
BODY MASS INDEX: 36.29 KG/M2 | WEIGHT: 231.2 LBS | DIASTOLIC BLOOD PRESSURE: 65 MMHG | OXYGEN SATURATION: 97 % | TEMPERATURE: 98.6 F | HEIGHT: 67 IN | SYSTOLIC BLOOD PRESSURE: 141 MMHG | RESPIRATION RATE: 16 BRPM | HEART RATE: 81 BPM

## 2020-04-21 DIAGNOSIS — C50.911 BILATERAL MALIGNANT NEOPLASM OF BREAST IN FEMALE, ESTROGEN RECEPTOR POSITIVE, UNSPECIFIED SITE OF BREAST (HCC): Primary | ICD-10-CM

## 2020-04-21 DIAGNOSIS — Z17.0 BILATERAL MALIGNANT NEOPLASM OF BREAST IN FEMALE, ESTROGEN RECEPTOR POSITIVE, UNSPECIFIED SITE OF BREAST (HCC): Primary | ICD-10-CM

## 2020-04-21 DIAGNOSIS — C50.912 BILATERAL MALIGNANT NEOPLASM OF BREAST IN FEMALE, ESTROGEN RECEPTOR POSITIVE, UNSPECIFIED SITE OF BREAST (HCC): Primary | ICD-10-CM

## 2020-04-21 LAB
ALBUMIN SERPL-MCNC: 3 G/DL (ref 3.5–5)
ALBUMIN/GLOB SERPL: 0.7 {RATIO} (ref 1.1–2.2)
ALP SERPL-CCNC: 112 U/L (ref 45–117)
ALT SERPL-CCNC: 24 U/L (ref 12–78)
ANION GAP SERPL CALC-SCNC: 6 MMOL/L (ref 5–15)
AST SERPL-CCNC: 13 U/L (ref 15–37)
BASO+EOS+MONOS # BLD AUTO: 1.1 K/UL (ref 0.2–1.2)
BASO+EOS+MONOS NFR BLD AUTO: 14 % (ref 3.2–16.9)
BILIRUB SERPL-MCNC: 0.5 MG/DL (ref 0.2–1)
BUN SERPL-MCNC: 14 MG/DL (ref 6–20)
BUN/CREAT SERPL: 15 (ref 12–20)
CALCIUM SERPL-MCNC: 9 MG/DL (ref 8.5–10.1)
CHLORIDE SERPL-SCNC: 106 MMOL/L (ref 97–108)
CO2 SERPL-SCNC: 27 MMOL/L (ref 21–32)
CREAT SERPL-MCNC: 0.91 MG/DL (ref 0.55–1.02)
DIFFERENTIAL METHOD BLD: NORMAL
ERYTHROCYTE [DISTWIDTH] IN BLOOD BY AUTOMATED COUNT: 14.6 % (ref 11.8–15.8)
GLOBULIN SER CALC-MCNC: 4.4 G/DL (ref 2–4)
GLUCOSE SERPL-MCNC: 123 MG/DL (ref 65–100)
HCT VFR BLD AUTO: 38.2 % (ref 35–47)
HGB BLD-MCNC: 12.7 G/DL (ref 11.5–16)
LYMPHOCYTES # BLD: 2 K/UL (ref 0.8–3.5)
LYMPHOCYTES NFR BLD: 25 % (ref 12–49)
MCH RBC QN AUTO: 29 PG (ref 26–34)
MCHC RBC AUTO-ENTMCNC: 33.2 G/DL (ref 30–36.5)
MCV RBC AUTO: 87.2 FL (ref 80–99)
NEUTS SEG # BLD: 4.7 K/UL (ref 1.8–8)
NEUTS SEG NFR BLD: 61 % (ref 32–75)
PLATELET # BLD AUTO: 262 K/UL (ref 150–400)
POTASSIUM SERPL-SCNC: 4 MMOL/L (ref 3.5–5.1)
PROT SERPL-MCNC: 7.4 G/DL (ref 6.4–8.2)
RBC # BLD AUTO: 4.38 M/UL (ref 3.8–5.2)
SODIUM SERPL-SCNC: 139 MMOL/L (ref 136–145)
WBC # BLD AUTO: 7.8 K/UL (ref 3.6–11)

## 2020-04-21 PROCEDURE — 96413 CHEMO IV INFUSION 1 HR: CPT

## 2020-04-21 PROCEDURE — 85025 COMPLETE CBC W/AUTO DIFF WBC: CPT

## 2020-04-21 PROCEDURE — 74011000250 HC RX REV CODE- 250: Performed by: INTERNAL MEDICINE

## 2020-04-21 PROCEDURE — 77030016057 HC NDL HUBR APOL -B

## 2020-04-21 PROCEDURE — 96375 TX/PRO/DX INJ NEW DRUG ADDON: CPT

## 2020-04-21 PROCEDURE — 74011250636 HC RX REV CODE- 250/636: Performed by: INTERNAL MEDICINE

## 2020-04-21 PROCEDURE — 80053 COMPREHEN METABOLIC PANEL: CPT

## 2020-04-21 PROCEDURE — 36415 COLL VENOUS BLD VENIPUNCTURE: CPT

## 2020-04-21 PROCEDURE — 96417 CHEMO IV INFUS EACH ADDL SEQ: CPT

## 2020-04-21 RX ORDER — DEXAMETHASONE SODIUM PHOSPHATE 10 MG/ML
10 INJECTION INTRAMUSCULAR; INTRAVENOUS ONCE
Status: COMPLETED | OUTPATIENT
Start: 2020-04-21 | End: 2020-04-21

## 2020-04-21 RX ORDER — DEXAMETHASONE SODIUM PHOSPHATE 4 MG/ML
10 INJECTION, SOLUTION INTRA-ARTICULAR; INTRALESIONAL; INTRAMUSCULAR; INTRAVENOUS; SOFT TISSUE ONCE
Status: DISCONTINUED | OUTPATIENT
Start: 2020-04-21 | End: 2020-04-21

## 2020-04-21 RX ORDER — SODIUM CHLORIDE 9 MG/ML
25 INJECTION, SOLUTION INTRAVENOUS CONTINUOUS
Status: DISPENSED | OUTPATIENT
Start: 2020-04-21 | End: 2020-04-21

## 2020-04-21 RX ORDER — DIPHENHYDRAMINE HYDROCHLORIDE 50 MG/ML
25 INJECTION, SOLUTION INTRAMUSCULAR; INTRAVENOUS AS NEEDED
Status: ACTIVE | OUTPATIENT
Start: 2020-04-21 | End: 2020-04-21

## 2020-04-21 RX ORDER — HEPARIN 100 UNIT/ML
300-500 SYRINGE INTRAVENOUS AS NEEDED
Status: ACTIVE | OUTPATIENT
Start: 2020-04-21 | End: 2020-04-21

## 2020-04-21 RX ORDER — SODIUM CHLORIDE 9 MG/ML
10 INJECTION INTRAMUSCULAR; INTRAVENOUS; SUBCUTANEOUS AS NEEDED
Status: ACTIVE | OUTPATIENT
Start: 2020-04-21 | End: 2020-04-21

## 2020-04-21 RX ORDER — SODIUM CHLORIDE 0.9 % (FLUSH) 0.9 %
10 SYRINGE (ML) INJECTION AS NEEDED
Status: DISPENSED | OUTPATIENT
Start: 2020-04-21 | End: 2020-04-21

## 2020-04-21 RX ORDER — HYDROCORTISONE SODIUM SUCCINATE 100 MG/2ML
100 INJECTION, POWDER, FOR SOLUTION INTRAMUSCULAR; INTRAVENOUS AS NEEDED
Status: ACTIVE | OUTPATIENT
Start: 2020-04-21 | End: 2020-04-21

## 2020-04-21 RX ORDER — DIPHENHYDRAMINE HYDROCHLORIDE 50 MG/ML
50 INJECTION, SOLUTION INTRAMUSCULAR; INTRAVENOUS ONCE
Status: COMPLETED | OUTPATIENT
Start: 2020-04-21 | End: 2020-04-21

## 2020-04-21 RX ADMIN — SODIUM CHLORIDE 10 ML: 9 INJECTION, SOLUTION INTRAMUSCULAR; INTRAVENOUS; SUBCUTANEOUS at 09:19

## 2020-04-21 RX ADMIN — HEPARIN 500 UNITS: 100 SYRINGE at 12:45

## 2020-04-21 RX ADMIN — SODIUM CHLORIDE 10 ML: 9 INJECTION, SOLUTION INTRAMUSCULAR; INTRAVENOUS; SUBCUTANEOUS at 12:45

## 2020-04-21 RX ADMIN — SODIUM CHLORIDE 10 ML: 9 INJECTION, SOLUTION INTRAMUSCULAR; INTRAVENOUS; SUBCUTANEOUS at 09:11

## 2020-04-21 RX ADMIN — SODIUM CHLORIDE 25 ML/HR: 900 INJECTION, SOLUTION INTRAVENOUS at 09:11

## 2020-04-21 RX ADMIN — PACLITAXEL 178 MG: 6 INJECTION, SOLUTION INTRAVENOUS at 11:29

## 2020-04-21 RX ADMIN — TRASTUZUMAB 420 MG: 150 INJECTION, POWDER, LYOPHILIZED, FOR SOLUTION INTRAVENOUS at 09:32

## 2020-04-21 RX ADMIN — DIPHENHYDRAMINE HYDROCHLORIDE 50 MG: 50 INJECTION INTRAMUSCULAR; INTRAVENOUS at 09:19

## 2020-04-21 RX ADMIN — DEXAMETHASONE SODIUM PHOSPHATE 10 MG: 10 INJECTION INTRAMUSCULAR; INTRAVENOUS at 09:11

## 2020-04-21 RX ADMIN — FAMOTIDINE 20 MG: 10 INJECTION INTRAVENOUS at 09:22

## 2020-04-21 NOTE — PROGRESS NOTES
[facility] Progress Note    Date: 2020    Name: Jaycob Canas    MRN: [id number]         : 1950      0800:  Ms. Meredith Adames Arrived ambulatory and in no distress for C1D1 of Herceptin/Taxol Regimen. Assessment was completed, no acute issues at this time, no new complaints voiced. Right chest wall port accessed without difficulty using 1 inch bliss needle, labs drawn & sent for processing. Echo on 20 showed EF=63.4%      Chemotherapy Flowsheet 2020   Cycle C1D1   Date 2020   Drug / Regimen Taxol/herceptin   Pre Meds given   Notes given       830 Patient proceed to appointment with Dr. Joselito Stewart. Ms. Hawkins Plan vitals were reviewed. Patient Vitals for the past 24 hrs:   Temp Pulse Resp BP SpO2   20 1231 98.6 °F (37 °C) 81 16 141/65 --   20 0815 98.6 °F (37 °C) 70 16 136/68 97 %       Lab results were obtained and reviewed. Recent Results (from the past 12 hour(s))   CBC WITH 3 PART DIFF    Collection Time: 20  8:26 AM   Result Value Ref Range    WBC 7.8 3.6 - 11.0 K/uL    RBC 4.38 3.80 - 5.20 M/uL    HGB 12.7 11.5 - 16.0 g/dL    HCT 38.2 35.0 - 47.0 %    MCV 87.2 80.0 - 99.0 FL    MCH 29.0 26.0 - 34.0 PG    MCHC 33.2 30.0 - 36.5 g/dL    RDW 14.6 11.8 - 15.8 %    PLATELET 373 262 - 417 K/uL    NEUTROPHILS 61 32 - 75 %    MIXED CELLS 14 3.2 - 16.9 %    LYMPHOCYTES 25 12 - 49 %    ABS. NEUTROPHILS 4.7 1.8 - 8.0 K/UL    ABS. MIXED CELLS 1.1 0.2 - 1.2 K/uL    ABS.  LYMPHOCYTES 2.0 0.8 - 3.5 K/UL    DF AUTOMATED     METABOLIC PANEL, COMPREHENSIVE    Collection Time: 20  8:26 AM   Result Value Ref Range    Sodium 139 136 - 145 mmol/L    Potassium 4.0 3.5 - 5.1 mmol/L    Chloride 106 97 - 108 mmol/L    CO2 27 21 - 32 mmol/L    Anion gap 6 5 - 15 mmol/L    Glucose 123 (H) 65 - 100 mg/dL    BUN 14 6 - 20 MG/DL    Creatinine 0.91 0.55 - 1.02 MG/DL    BUN/Creatinine ratio 15 12 - 20      GFR est AA >60 >60 ml/min/1.73m2    GFR est non-AA >60 >60 ml/min/1.73m2    Calcium 9.0 8.5 - 10.1 MG/DL    Bilirubin, total 0.5 0.2 - 1.0 MG/DL    ALT (SGPT) 24 12 - 78 U/L    AST (SGOT) 13 (L) 15 - 37 U/L    Alk.  phosphatase 112 45 - 117 U/L    Protein, total 7.4 6.4 - 8.2 g/dL    Albumin 3.0 (L) 3.5 - 5.0 g/dL    Globulin 4.4 (H) 2.0 - 4.0 g/dL    A-G Ratio 0.7 (L) 1.1 - 2.2         Medications:  Medications Administered     0.9% sodium chloride infusion     Admin Date  04/21/2020 Action  New Bag Dose  25 mL/hr Rate  25 mL/hr Route  IntraVENous Administered By  Maranda Barragan RN          0.9% sodium chloride injection 10 mL     Admin Date  04/21/2020 Action  Given Dose  10 mL Route  IntraVENous Administered By  Maranda Barragan RN           Admin Date  04/21/2020 Action  Given Dose  10 mL Route  IntraVENous Administered By  Maranda Barragan RN           Admin Date  04/21/2020 Action  Given Dose  10 mL Route  IntraVENous Administered By  Maranda Barragan RN          dexamethasone (PF) (DECADRON) 10 mg/mL injection 10 mg     Admin Date  04/21/2020 Action  Given Dose  10 mg Route  IntraVENous Administered By  Maranda Barragan RN          diphenhydrAMINE (BENADRYL) injection 50 mg     Admin Date  04/21/2020 Action  Given Dose  50 mg Route  IntraVENous Administered By  Maranda Barragan RN          famotidine (PF) (PEPCID) 20 mg in 0.9% sodium chloride 10 mL injection     Admin Date  04/21/2020 Action  Given Dose  20 mg Route  IntraVENous Administered By  Maranda Barragan RN          heparin (porcine) pf 300-500 Units     Admin Date  04/21/2020 Action  Given Dose  500 Units Route  InterCATHeter Administered By  Maranda Barragan RN          PACLitaxeL (TAXOL) 178 mg in 0.9% sodium chloride 250 mL, overfill volume 25 mL chemo infusion     Admin Date  04/21/2020 Action  New Bag Dose  178 mg Rate  304.7 mL/hr Route  IntraVENous Administered By  Maranda Barragan RN          trastuzumab (HERCEPTIN) 420 mg in 0.9% sodium chloride 250 mL, overfill volume 25 mL IVPB     Admin Date  04/21/2020 Action  New Bag Dose  420 mg Rate  196.7 mL/hr Route  IntraVENous Administered By  uBtch Díaz RN                Ms. Meredith Adames tolerated treatment well and was discharged from [place] [address] in stable condition at 1300. Port de-accessed, flushed & heparinized per protocol. She is to return on April 28 at 1100 for her next appointment.     Elia Jenkins RN  April 21, 2020

## 2020-04-21 NOTE — PROGRESS NOTES
[facility] at [place] [address] [address] [facility] [address], [address] Mina [phone]  F: [phone]      Reason for Visit:   Nate Hoang is a 71 y.o. female who is seen in consultation at the request of Dr. Claudeen Mitts for evaluation of therapy for breast cancer. Rad onc:  Dr. Wes Braden    Treatment History:   · 20 right breast mass:  9:00 core bx, IMC, gr 1, 5 mm, ER + at 99%, [place] + at 97%, HER 2 negative at IHC 0, ki67 8%; left breast mass: 3:00 core bx, IMC, gr 3,  5 mm, ER + at 98%, [place] negative, HER 2 POSITIVE at [facility] 3+  · invitae genetic testing:  BRCA2 and RAD50 VUS  · 3/23/20 right breast lumpectomy:  ILC, 7 mm, gr 1, no LVI, 0/8 LN, pT1b pN0 cMo; left breast lumpectomy:  IDC, 1.1 cm, gr 3, 0/2 LN, DCIS present, no LVI, pT1c pN0 cM0  · Paclitaxel/trastuzumab 20-    History of Present Illness: An abnormal mammogram led to the pathology above    Interval history:  Gr 2 itching on her neck    FH: No breast or ovarian cancer, no prostate or pancreas cancer    Past Medical History:   Diagnosis Date    Cancer [facility])       History reviewed. No pertinent surgical history. Social History     Tobacco Use    Smoking status: Never Smoker    Smokeless tobacco: Never Used   Substance Use Topics    Alcohol use: Not Currently      History reviewed. No pertinent family history. Current Outpatient Medications   Medication Sig    ergocalciferol (ERGOCALCIFEROL) 1,250 mcg (50,000 unit) capsule TK 1 C PO 1 TIME A WK    losartan (COZAAR) 50 mg tablet TK 1 T PO QD    omeprazole (PRILOSEC) 20 mg capsule TK 1 C PO QD    rosuvastatin (CRESTOR) 20 mg tablet TK 1 T PO QD    aspirin delayed-release 81 mg tablet Take  by mouth daily.  lidocaine-prilocaine (EMLA) topical cream Apply  to affected area as needed for Pain.  prochlorperazine (COMPAZINE) 10 mg tablet Take 1 Tab by mouth every six (6) hours as needed for Nausea.     ondansetron hcl (ZOFRAN) 8 mg tablet Take 1 Tab by mouth every eight (8) hours as needed for Nausea. No current facility-administered medications for this visit. Not on File     Review of Systems: A complete review of systems was obtained, negative except as described above. Physical Exam:     Visit Vitals  /68 (BP 1 Location: Left arm, BP Patient Position: Sitting)   Pulse 70   Temp 98.6 °F (37 °C) (Temporal)   Resp 16   Ht 5' 7\" (1.702 m)   Wt 231 lb (104.8 kg)   SpO2 97%   BMI 36.18 kg/m²     ECOG PS: 0  General: No distress  Eyes: Anicteric sclerae  HENT: Atraumatic  Neck: Supple  Respiratory: Normal respiratory effort  CV: No peripheral edema  GI: nondistended  Skin: No rashes, ecchymoses, or petechiae  Psych: Alert, oriented, appropriate affect, normal judgment/insight      Results:     Lab Results   Component Value Date/Time    WBC 7.8 04/21/2020 08:26 AM    HGB 12.7 04/21/2020 08:26 AM    HCT 38.2 04/21/2020 08:26 AM    PLATELET 638 47/19/5140 08:26 AM    MCV 87.2 04/21/2020 08:26 AM    ABS. NEUTROPHILS 4.7 04/21/2020 08:26 AM     No results found for: NA, K, CL, CO2, GLU, BUN, CREA, GFRAA, GFRNA, CA, NAPOC, KPOCT, CLPOC, GLUCPOC, IBUN, CREAPOC, ICAI  No results found for: TBILI, ALT, SGOT, AP, TP, ALB, GLOB    2/11/20 MRI breast  Left:  11 mm mass at 3:00  Right:  9:00, 5 mm mass  No LAD    4/20/20 TTE EF 63%    Records reviewed and summarized above. Pathology report(s) reviewed above. Radiology report(s) reviewed above. Assessment/plan:   1. Left breast IDC, 1.1 cm, 0/2 LN, ER +, [place] negative, HER 2 POSITIVE, gr 3:  Stage IA (both anatomic and prognostic)    We explained to the patient that the goal of systemic adjuvant therapy is to improve the chances for cure and decrease the risk of relapse. We explained why a patient can have microscopic cancer spread now even though physical examination, laboratory studies and imaging studies are negative for cancer.  We explained that the same treatments used now as adjuvant or preventive treatments rarely if ever are curative in women who develop metastases. Using eprognosis. org, her 5 year all cause mortality risk is only 6-8%; her 10 year all cause mortality risk is 15-23%; her median OS is 17-21 years. An adjuvant conversation is warranted. Discussed weekly paclitaxel 80 mg/m2 with weekly trastuzumab 4 mg/kg load then 2 mg/kg weekly x 12 followed by completion of a year of trastuzumab 6 mg/kg q 3 weeks. This is APT study. I discussed the potential risks of paclitaxel chemotherapy with the patient. Major toxicities include nausea and vomiting, stomatitis, fatigue. Anemia frequently results. Neutropenic fever is uncommon, but can be a life-threatening problem. We provided the patient with detailed information concerning toxicity including frequent toxicities that only last a few days, such as nausea, vomiting, mouth sores, arthralgia, myalgia, and potentially allergic reactions to paclitaxel, as well as toxicities which can be longer lasting including fatigue, anemia and neuropathy. We provided the patient with detailed information concerning the toxicities of their regimen in addition to our verbal discussion. Rationale for therapy with trastuzumab was also discussed with the patient including a 50% proportional improvement in disease free survival and also an improvement in overall survival in patients receiving trastuzumab and chemotherapy for HER-2 positive breast cancer. The side effects of trastuzumab were discussed including a 4%-5% risk of dropping her ejection fraction while on treatment and about a 1% risk of CHF. We discussed that this drug will be used every 3 weeks for remainder of a year following the chemotherapy cycles. We will check her EF before chemotherapy and every 3 months while she is receiving trastuzumab. After this discussion, she is agreeable to paclitaxel and trastuzumab as above.  She has signed informed consent. Paclitaxel/trastuzumab #1 today     Discussed oral and peripheral cryotherapy. She is not interested in cold caps    Dr. Robyn Bradshaw has placed port    rx for zofran, compazine, emla cream    Following chemotherapy, she is an excellent candidate for radiation and endocrine therapy    2. Right breast ILC, 7 mm, ER +, [place] +, HER 2 negative, gr 1, 0/8 LN, stage IA both anatomic and prognostic    We explained to the patient that the goal of systemic adjuvant therapy is to improve the chances for cure and decrease the risk of relapse. We explained why a patient can have microscopic cancer spread now even though physical examination, laboratory studies and imaging studies are negative for cancer. We explained that the same treatments used now as adjuvant or preventive treatments rarely if ever are curative in women who develop metastases. Recommend endocrine therapy, see #1 above. While she does not quite meet the criteria on this side for CALGB 9343 (76 yo instead of 70), she does meed KIT guidelines for possibly avoiding XRT on this side, though would defer to Dr. Trinidad Velasquez since XRT is indicated on the Left due to HER2 + disease. 3. Emotional well being:  She has excellent support and is coping well with her disease    4. BRCA2 and RAD50 VUS:  Not clinically significant at this time    5. Neck itching:  OTC benadryl cream    I appreciate the opportunity to participate in Ms. Jen Atwood care. Signed By: Atif Salgado MD      No orders of the defined types were placed in this encounter.

## 2020-04-21 NOTE — DISCHARGE INSTRUCTIONS

## 2020-04-21 NOTE — PROGRESS NOTES
Ivette Maciel is a 71 y.o. female Follow up for the Evaluation for Breast Cancer. 1. Have you been to the ER, urgent care clinic since your last visit? Hospitalized since your last visit? No    2. Have you seen or consulted any other health care providers outside of the [address] [place] [address] since your last visit? Include any pap smears or colon screening.  No

## 2020-04-28 ENCOUNTER — OFFICE VISIT (OUTPATIENT)
Dept: ONCOLOGY | Age: 70
End: 2020-04-28

## 2020-04-28 ENCOUNTER — HOSPITAL ENCOUNTER (OUTPATIENT)
Dept: INFUSION THERAPY | Age: 70
Discharge: HOME | End: 2020-04-28
Payer: MEDICARE

## 2020-04-28 ENCOUNTER — APPOINTMENT (OUTPATIENT)
Dept: INFUSION THERAPY | Age: 70
End: 2020-04-28
Payer: MEDICARE

## 2020-04-28 VITALS
RESPIRATION RATE: 16 BRPM | SYSTOLIC BLOOD PRESSURE: 129 MMHG | HEART RATE: 77 BPM | WEIGHT: 236 LBS | TEMPERATURE: 98.5 F | DIASTOLIC BLOOD PRESSURE: 62 MMHG | BODY MASS INDEX: 37.04 KG/M2 | HEIGHT: 67 IN

## 2020-04-28 VITALS
HEIGHT: 67 IN | DIASTOLIC BLOOD PRESSURE: 65 MMHG | WEIGHT: 236 LBS | TEMPERATURE: 98.5 F | RESPIRATION RATE: 16 BRPM | BODY MASS INDEX: 37.04 KG/M2 | SYSTOLIC BLOOD PRESSURE: 144 MMHG | OXYGEN SATURATION: 98 % | HEART RATE: 78 BPM

## 2020-04-28 DIAGNOSIS — C50.911 BILATERAL MALIGNANT NEOPLASM OF BREAST IN FEMALE, ESTROGEN RECEPTOR POSITIVE, UNSPECIFIED SITE OF BREAST (HCC): Primary | ICD-10-CM

## 2020-04-28 DIAGNOSIS — Z17.0 BILATERAL MALIGNANT NEOPLASM OF BREAST IN FEMALE, ESTROGEN RECEPTOR POSITIVE, UNSPECIFIED SITE OF BREAST (HCC): Primary | ICD-10-CM

## 2020-04-28 DIAGNOSIS — C50.912 BILATERAL MALIGNANT NEOPLASM OF BREAST IN FEMALE, ESTROGEN RECEPTOR POSITIVE, UNSPECIFIED SITE OF BREAST (HCC): Primary | ICD-10-CM

## 2020-04-28 DIAGNOSIS — Z51.11 CHEMOTHERAPY MANAGEMENT, ENCOUNTER FOR: ICD-10-CM

## 2020-04-28 LAB
BASOPHILS # BLD: 0 K/UL (ref 0–0.1)
BASOPHILS NFR BLD: 1 % (ref 0–1)
DIFFERENTIAL METHOD BLD: ABNORMAL
EOSINOPHIL # BLD: 0.7 K/UL (ref 0–0.4)
EOSINOPHIL NFR BLD: 13 % (ref 0–7)
ERYTHROCYTE [DISTWIDTH] IN BLOOD BY AUTOMATED COUNT: 13.6 % (ref 11.5–14.5)
HCT VFR BLD AUTO: 36.9 % (ref 35–47)
HGB BLD-MCNC: 12.1 G/DL (ref 11.5–16)
IMM GRANULOCYTES # BLD AUTO: 0 K/UL (ref 0–0.04)
IMM GRANULOCYTES NFR BLD AUTO: 1 % (ref 0–0.5)
LYMPHOCYTES # BLD: 1.7 K/UL (ref 0.8–3.5)
LYMPHOCYTES NFR BLD: 32 % (ref 12–49)
MCH RBC QN AUTO: 29.1 PG (ref 26–34)
MCHC RBC AUTO-ENTMCNC: 32.8 G/DL (ref 30–36.5)
MCV RBC AUTO: 88.7 FL (ref 80–99)
MONOCYTES # BLD: 0.2 K/UL (ref 0–1)
MONOCYTES NFR BLD: 4 % (ref 5–13)
NEUTS SEG # BLD: 2.7 K/UL (ref 1.8–8)
NEUTS SEG NFR BLD: 49 % (ref 32–75)
NRBC # BLD: 0 K/UL (ref 0–0.01)
NRBC BLD-RTO: 0 PER 100 WBC
PLATELET # BLD AUTO: 289 K/UL (ref 150–400)
PMV BLD AUTO: 10.8 FL (ref 8.9–12.9)
RBC # BLD AUTO: 4.16 M/UL (ref 3.8–5.2)
WBC # BLD AUTO: 5.4 K/UL (ref 3.6–11)

## 2020-04-28 PROCEDURE — 96413 CHEMO IV INFUSION 1 HR: CPT

## 2020-04-28 PROCEDURE — 96375 TX/PRO/DX INJ NEW DRUG ADDON: CPT

## 2020-04-28 PROCEDURE — 77030012965 HC NDL HUBR BBMI -A

## 2020-04-28 PROCEDURE — 36415 COLL VENOUS BLD VENIPUNCTURE: CPT

## 2020-04-28 PROCEDURE — 74011000250 HC RX REV CODE- 250: Performed by: INTERNAL MEDICINE

## 2020-04-28 PROCEDURE — 96417 CHEMO IV INFUS EACH ADDL SEQ: CPT

## 2020-04-28 PROCEDURE — 85025 COMPLETE CBC W/AUTO DIFF WBC: CPT

## 2020-04-28 PROCEDURE — 74011250636 HC RX REV CODE- 250/636: Performed by: INTERNAL MEDICINE

## 2020-04-28 RX ORDER — DIPHENHYDRAMINE HYDROCHLORIDE 50 MG/ML
50 INJECTION, SOLUTION INTRAMUSCULAR; INTRAVENOUS ONCE
Status: COMPLETED | OUTPATIENT
Start: 2020-04-28 | End: 2020-04-28

## 2020-04-28 RX ORDER — SODIUM CHLORIDE 9 MG/ML
25 INJECTION, SOLUTION INTRAVENOUS CONTINUOUS
Status: DISCONTINUED | OUTPATIENT
Start: 2020-04-28 | End: 2020-04-29 | Stop reason: HOSPADM

## 2020-04-28 RX ORDER — SODIUM CHLORIDE 0.9 % (FLUSH) 0.9 %
10 SYRINGE (ML) INJECTION AS NEEDED
Status: DISCONTINUED | OUTPATIENT
Start: 2020-04-28 | End: 2020-04-29 | Stop reason: HOSPADM

## 2020-04-28 RX ORDER — DEXAMETHASONE SODIUM PHOSPHATE 10 MG/ML
10 INJECTION INTRAMUSCULAR; INTRAVENOUS ONCE
Status: COMPLETED | OUTPATIENT
Start: 2020-04-28 | End: 2020-04-28

## 2020-04-28 RX ORDER — HEPARIN 100 UNIT/ML
300-500 SYRINGE INTRAVENOUS AS NEEDED
Status: DISCONTINUED | OUTPATIENT
Start: 2020-04-28 | End: 2020-04-29 | Stop reason: HOSPADM

## 2020-04-28 RX ORDER — SODIUM CHLORIDE 9 MG/ML
10 INJECTION INTRAMUSCULAR; INTRAVENOUS; SUBCUTANEOUS AS NEEDED
Status: DISCONTINUED | OUTPATIENT
Start: 2020-04-28 | End: 2020-04-29 | Stop reason: HOSPADM

## 2020-04-28 RX ADMIN — PACLITAXEL 178 MG: 6 INJECTION, SOLUTION INTRAVENOUS at 14:25

## 2020-04-28 RX ADMIN — SODIUM CHLORIDE 25 ML/HR: 900 INJECTION, SOLUTION INTRAVENOUS at 13:23

## 2020-04-28 RX ADMIN — SODIUM CHLORIDE 10 ML: 9 INJECTION, SOLUTION INTRAMUSCULAR; INTRAVENOUS; SUBCUTANEOUS at 11:15

## 2020-04-28 RX ADMIN — DIPHENHYDRAMINE HYDROCHLORIDE 50 MG: 50 INJECTION INTRAMUSCULAR; INTRAVENOUS at 13:29

## 2020-04-28 RX ADMIN — Medication 10 ML: at 15:34

## 2020-04-28 RX ADMIN — Medication 500 UNITS: at 15:35

## 2020-04-28 RX ADMIN — TRASTUZUMAB 210 MG: 150 INJECTION, POWDER, LYOPHILIZED, FOR SOLUTION INTRAVENOUS at 13:41

## 2020-04-28 RX ADMIN — DEXAMETHASONE SODIUM PHOSPHATE 10 MG: 10 INJECTION INTRAMUSCULAR; INTRAVENOUS at 13:25

## 2020-04-28 RX ADMIN — FAMOTIDINE 20 MG: 10 INJECTION INTRAVENOUS at 13:32

## 2020-04-28 NOTE — PROGRESS NOTES
Ace Jaquez is a 71 y.o. female Follow up for the Evaluation of Breast Cancer. 1. Have you been to the ER, urgent care clinic since your last visit? Hospitalized since your last visit? No    2. Have you seen or consulted any other health care providers outside of the [address] [place] [address] since your last visit? Include any pap smears or colon screening.  No

## 2020-04-28 NOTE — PROGRESS NOTES
[facility] VISIT NOTE  Date: 2020    Name: Mago Berman    MRN: [id number]         : 1950    1110  Ms. Paula Leal Arrived ambulatory and in no distress for C1D8 of Taxol/Herceptin Regimen. Assessment was completed, no acute issues at this time, no new complaints voiced. Right chest wall port accessed without difficulty, labs drawn & sent for processing. Patient to see MD.    Chemotherapy Flowsheet 2020   Cycle C1D8   Date 2020   Drug / Regimen Taxol/Herceptin   Pre Meds given   Notes given       Vitals:  Patient Vitals for the past 12 hrs:   Temp Pulse Resp BP   20 1533 -- 77 -- 129/62   20 1110 98.5 °F (36.9 °C) 78 16 144/65        Lab results were obtained and reviewed. Recent Results (from the past 12 hour(s))   CBC WITH AUTOMATED DIFF    Collection Time: 20 11:59 AM   Result Value Ref Range    WBC 5.4 3.6 - 11.0 K/uL    RBC 4.16 3.80 - 5.20 M/uL    HGB 12.1 11.5 - 16.0 g/dL    HCT 36.9 35.0 - 47.0 %    MCV 88.7 80.0 - 99.0 FL    MCH 29.1 26.0 - 34.0 PG    MCHC 32.8 30.0 - 36.5 g/dL    RDW 13.6 11.5 - 14.5 %    PLATELET 263 469 - 721 K/uL    MPV 10.8 8.9 - 12.9 FL    NRBC 0.0 0  WBC    ABSOLUTE NRBC 0.00 0.00 - 0.01 K/uL    NEUTROPHILS 49 32 - 75 %    LYMPHOCYTES 32 12 - 49 %    MONOCYTES 4 (L) 5 - 13 %    EOSINOPHILS 13 (H) 0 - 7 %    BASOPHILS 1 0 - 1 %    IMMATURE GRANULOCYTES 1 (H) 0.0 - 0.5 %    ABS. NEUTROPHILS 2.7 1.8 - 8.0 K/UL    ABS. LYMPHOCYTES 1.7 0.8 - 3.5 K/UL    ABS. MONOCYTES 0.2 0.0 - 1.0 K/UL    ABS. EOSINOPHILS 0.7 (H) 0.0 - 0.4 K/UL    ABS. BASOPHILS 0.0 0.0 - 0.1 K/UL    ABS. IMM.  GRANS. 0.0 0.00 - 0.04 K/UL    DF AUTOMATED         Medications received:  Medications Administered     0.9% sodium chloride infusion     Admin Date  2020 Action  New Bag Dose  25 mL/hr Rate  25 mL/hr Route  IntraVENous Administered By  Fallon Aguirre RN          0.9% sodium chloride injection 10 mL     Admin Date  2020 Action  Given Dose  10 mL Route  IntraVENous Administered By  Lizeth Gonsalves RN          dexamethasone (PF) (DECADRON) 10 mg/mL injection 10 mg     Admin Date  04/28/2020 Action  Given Dose  10 mg Route  IntraVENous Administered By  Lizeth Gonsalves RN          diphenhydrAMINE (BENADRYL) injection 50 mg     Admin Date  04/28/2020 Action  Given Dose  50 mg Route  IntraVENous Administered By  Lizeth Gonsalves RN          famotidine (PF) (PEPCID) 20 mg in 0.9% sodium chloride 10 mL injection     Admin Date  04/28/2020 Action  Given Dose  20 mg Route  IntraVENous Administered By  Lizeth Gonsalves RN          heparin (porcine) pf 300-500 Units     Admin Date  04/28/2020 Action  Given Dose  500 Units Route  InterCATHeter Administered By  Lizeth Gonsalves RN          PACLitaxeL (TAXOL) 178 mg in 0.9% sodium chloride 250 mL, overfill volume 25 mL chemo infusion     Admin Date  04/28/2020 Action  New Bag Dose  178 mg Rate  304.7 mL/hr Route  IntraVENous Administered By  Lizeth Gonsalves RN          saline peripheral flush soln 10 mL     Admin Date  04/28/2020 Action  Given Dose  10 mL Route  InterCATHeter Administered By  Lizeth Gonsalves RN          trastuzumab (HERCEPTIN) 210 mg in 0.9% sodium chloride 250 mL, overfill volume 25 mL IVPB     Admin Date  04/28/2020 Action  New Bag Dose  210 mg Rate  570 mL/hr Route  IntraVENous Administered By  Lizeth Gonsalves RN                Ms. David Garcia tolerated treatment well and was discharged from [place] [address] in stable condition at 1540. Port de-accessed, flushed & heparinized per protocol. She is to return on 5/5/2020 at 10:30 for her next appointment.     Yolie Price RN  April 28, 2020    Future Appointments:  Future Appointments   Date Time Provider Samantha Bhat   5/5/2020 10:30 AM SS INF7 CH3 <1H [facility]   5/12/2020 10:00 AM SS INF7 CH2 <1H [facility]   5/12/2020 10:30 AM Jayson Johnson MD [address] [place] [address], [place] [address]   5/19/2020  9:30 AM SS INF7 CH2 <1H [facility] [place]   5/26/2020 11:30 AM SS INF5 CH4 <1H [facility]   6/2/2020  9:00 AM SS INF4 CH3 <4H [facility]   6/9/2020 10:00 AM SS INF7 CH4 <4H [facility]

## 2020-04-28 NOTE — PROGRESS NOTES
[facility]  [address] [facility], [address]  María Pierson: [phone]  F: [phone]      Reason for Visit:   Mee Macias is a 71 y.o. female who is seen in consultation at the request of Dr. Edwin Masters for evaluation of therapy for breast cancer. Rad onc:  Dr. Flora Jorgensen    Treatment History:   · 1/24/20 right breast mass:  9:00 core bx, IMC, gr 1, 5 mm, ER + at 99%, [place] + at 97%, HER 2 negative at IHC 0, ki67 8%; left breast mass: 3:00 core bx, IMC, gr 3,  5 mm, ER + at 98%, [place] negative, HER 2 POSITIVE at [facility] 3+  · invitae genetic testing:  BRCA2 and RAD50 VUS  · 3/23/20 right breast lumpectomy:  ILC, 7 mm, gr 1, no LVI, 0/8 LN, pT1b pN0 cMo; left breast lumpectomy:  IDC, 1.1 cm, gr 3, 0/2 LN, DCIS present, no LVI, pT1c pN0 cM0  · Paclitaxel/trastuzumab 4/21/20-    History of Present Illness: An abnormal mammogram led to the pathology above    Interval history:  In today for follow up and treatment. Complains of gr 1 itching and mouth soreness. FH:  No breast or ovarian cancer, no prostate or pancreas cancer    Past Medical History:   Diagnosis Date    Cancer [facility])       History reviewed. No pertinent surgical history. Social History     Tobacco Use    Smoking status: Never Smoker    Smokeless tobacco: Never Used   Substance Use Topics    Alcohol use: Not Currently      History reviewed. No pertinent family history. Current Outpatient Medications   Medication Sig    ergocalciferol (ERGOCALCIFEROL) 1,250 mcg (50,000 unit) capsule TK 1 C PO 1 TIME A WK    losartan (COZAAR) 50 mg tablet TK 1 T PO QD    omeprazole (PRILOSEC) 20 mg capsule TK 1 C PO QD    rosuvastatin (CRESTOR) 20 mg tablet TK 1 T PO QD    aspirin delayed-release 81 mg tablet Take  by mouth daily.  lidocaine-prilocaine (EMLA) topical cream Apply  to affected area as needed for Pain.     prochlorperazine (COMPAZINE) 10 mg tablet Take 1 Tab by mouth every six (6) hours as needed for Nausea.  ondansetron hcl (ZOFRAN) 8 mg tablet Take 1 Tab by mouth every eight (8) hours as needed for Nausea. No current facility-administered medications for this visit. Not on File     Review of Systems: A complete review of systems was obtained, negative except as described above. Physical Exam:     Visit Vitals  /65 (BP 1 Location: Left arm, BP Patient Position: Sitting)   Pulse 78   Temp 98.5 °F (36.9 °C) (Temporal)   Resp 16   Ht 5' 7\" (1.702 m)   Wt 236 lb (107 kg)   SpO2 98%   BMI 36.96 kg/m²     General: No distress  Eyes: Anicteric sclerae  HENT: Atraumatic  Neck: Supple  Respiratory: Normal respiratory effort  CV: No peripheral edema  GI: nondistended  Skin: No rashes, ecchymoses, or petechiae  Psych: Alert, oriented, appropriate affect, normal judgment/insight      Results:     Lab Results   Component Value Date/Time    WBC 7.8 04/21/2020 08:26 AM    HGB 12.7 04/21/2020 08:26 AM    HCT 38.2 04/21/2020 08:26 AM    PLATELET 322 83/36/4074 08:26 AM    MCV 87.2 04/21/2020 08:26 AM    ABS. NEUTROPHILS 4.7 04/21/2020 08:26 AM     Lab Results   Component Value Date/Time    Sodium 139 04/21/2020 08:26 AM    Potassium 4.0 04/21/2020 08:26 AM    Chloride 106 04/21/2020 08:26 AM    CO2 27 04/21/2020 08:26 AM    Glucose 123 (H) 04/21/2020 08:26 AM    BUN 14 04/21/2020 08:26 AM    Creatinine 0.91 04/21/2020 08:26 AM    GFR est AA >60 04/21/2020 08:26 AM    GFR est non-AA >60 04/21/2020 08:26 AM    Calcium 9.0 04/21/2020 08:26 AM     Lab Results   Component Value Date/Time    Bilirubin, total 0.5 04/21/2020 08:26 AM    ALT (SGPT) 24 04/21/2020 08:26 AM    AST (SGOT) 13 (L) 04/21/2020 08:26 AM    Alk.  phosphatase 112 04/21/2020 08:26 AM    Protein, total 7.4 04/21/2020 08:26 AM    Albumin 3.0 (L) 04/21/2020 08:26 AM    Globulin 4.4 (H) 04/21/2020 08:26 AM       2/11/20 MRI breast  Left:  11 mm mass at 3:00  Right:  9:00, 5 mm mass  No LAD    4/20/20 TTE EF 63%    Records reviewed and summarized above. Pathology report(s) reviewed above. Radiology report(s) reviewed above. Assessment/plan:   1. Left breast IDC, 1.1 cm, 0/2 LN, ER +, [place] negative, HER 2 POSITIVE, gr 3:  Stage IA (both anatomic and prognostic)    We explained to the patient that the goal of systemic adjuvant therapy is to improve the chances for cure and decrease the risk of relapse. We explained why a patient can have microscopic cancer spread now even though physical examination, laboratory studies and imaging studies are negative for cancer. We explained that the same treatments used now as adjuvant or preventive treatments rarely if ever are curative in women who develop metastases. Using eprognosis. org, her 5 year all cause mortality risk is only 6-8%; her 10 year all cause mortality risk is 15-23%; her median OS is 17-21 years. An adjuvant conversation is warranted. Discussed weekly paclitaxel 80 mg/m2 with weekly trastuzumab 4 mg/kg load then 2 mg/kg weekly x 12 followed by completion of a year of trastuzumab 6 mg/kg q 3 weeks. This is APT study. I discussed the potential risks of paclitaxel chemotherapy with the patient. Major toxicities include nausea and vomiting, stomatitis, fatigue. Anemia frequently results. Neutropenic fever is uncommon, but can be a life-threatening problem. We provided the patient with detailed information concerning toxicity including frequent toxicities that only last a few days, such as nausea, vomiting, mouth sores, arthralgia, myalgia, and potentially allergic reactions to paclitaxel, as well as toxicities which can be longer lasting including fatigue, anemia and neuropathy. We provided the patient with detailed information concerning the toxicities of their regimen in addition to our verbal discussion.     Rationale for therapy with trastuzumab was also discussed with the patient including a 50% proportional improvement in disease free survival and also an improvement in overall survival in patients receiving trastuzumab and chemotherapy for HER-2 positive breast cancer. The side effects of trastuzumab were discussed including a 4%-5% risk of dropping her ejection fraction while on treatment and about a 1% risk of CHF. We discussed that this drug will be used every 3 weeks for remainder of a year following the chemotherapy cycles. We will check her EF before chemotherapy and every 3 months while she is receiving trastuzumab. After this discussion, she is agreeable to paclitaxel and trastuzumab as above. She has signed informed consent. Paclitaxel/trastuzumab #2 today     Discussed oral and peripheral cryotherapy. She is not interested in cold caps    Dr. Ileana Gustafson has placed port    rx for zofran, compazine, emla cream    Following chemotherapy, she is an excellent candidate for radiation and endocrine therapy    2. Right breast ILC, 7 mm, ER +, [place] +, HER 2 negative, gr 1, 0/8 LN, stage IA both anatomic and prognostic    We explained to the patient that the goal of systemic adjuvant therapy is to improve the chances for cure and decrease the risk of relapse. We explained why a patient can have microscopic cancer spread now even though physical examination, laboratory studies and imaging studies are negative for cancer. We explained that the same treatments used now as adjuvant or preventive treatments rarely if ever are curative in women who develop metastases. Recommend endocrine therapy, see #1 above. While she does not quite meet the criteria on this side for CALGB 9343 (76 yo instead of 70), she does meed KIT guidelines for possibly avoiding XRT on this side, though would defer to Dr. Davide Chavez since XRT is indicated on the Left due to HER2 + disease. 3. Emotional well being:  She has excellent support and is coping well with her disease    4. BRCA2 and RAD50 VUS:  Not clinically significant at this time    5. Neck itching:  OTC benadryl cream    6.  Obesity:  Will monitor following chemo    I appreciate the opportunity to participate in Ms. Delbert Barboza care. Signed By: Oly Pathak MD      No orders of the defined types were placed in this encounter.

## 2020-05-05 ENCOUNTER — HOSPITAL ENCOUNTER (OUTPATIENT)
Dept: INFUSION THERAPY | Age: 70
Discharge: HOME | End: 2020-05-05
Payer: MEDICARE

## 2020-05-05 ENCOUNTER — APPOINTMENT (OUTPATIENT)
Dept: INFUSION THERAPY | Age: 70
End: 2020-05-05
Payer: MEDICARE

## 2020-05-05 VITALS
WEIGHT: 236.3 LBS | BODY MASS INDEX: 37.09 KG/M2 | DIASTOLIC BLOOD PRESSURE: 63 MMHG | HEIGHT: 67 IN | SYSTOLIC BLOOD PRESSURE: 135 MMHG | TEMPERATURE: 98.8 F | HEART RATE: 80 BPM

## 2020-05-05 DIAGNOSIS — C50.911 BILATERAL MALIGNANT NEOPLASM OF BREAST IN FEMALE, ESTROGEN RECEPTOR POSITIVE, UNSPECIFIED SITE OF BREAST (HCC): Primary | ICD-10-CM

## 2020-05-05 DIAGNOSIS — C50.912 BILATERAL MALIGNANT NEOPLASM OF BREAST IN FEMALE, ESTROGEN RECEPTOR POSITIVE, UNSPECIFIED SITE OF BREAST (HCC): Primary | ICD-10-CM

## 2020-05-05 DIAGNOSIS — Z17.0 BILATERAL MALIGNANT NEOPLASM OF BREAST IN FEMALE, ESTROGEN RECEPTOR POSITIVE, UNSPECIFIED SITE OF BREAST (HCC): Primary | ICD-10-CM

## 2020-05-05 LAB
BASO+EOS+MONOS # BLD AUTO: 0.2 K/UL (ref 0.2–1.2)
BASO+EOS+MONOS NFR BLD AUTO: 5 % (ref 3.2–16.9)
DIFFERENTIAL METHOD BLD: ABNORMAL
ERYTHROCYTE [DISTWIDTH] IN BLOOD BY AUTOMATED COUNT: 14.6 % (ref 11.8–15.8)
HCT VFR BLD AUTO: 34.3 % (ref 35–47)
HGB BLD-MCNC: 11.6 G/DL (ref 11.5–16)
LYMPHOCYTES # BLD: 1.9 K/UL (ref 0.8–3.5)
LYMPHOCYTES NFR BLD: 46 % (ref 12–49)
MCH RBC QN AUTO: 29.6 PG (ref 26–34)
MCHC RBC AUTO-ENTMCNC: 33.8 G/DL (ref 30–36.5)
MCV RBC AUTO: 87.5 FL (ref 80–99)
NEUTS SEG # BLD: 2.1 K/UL (ref 1.8–8)
NEUTS SEG NFR BLD: 49 % (ref 32–75)
PLATELET # BLD AUTO: 414 K/UL (ref 150–400)
RBC # BLD AUTO: 3.92 M/UL (ref 3.8–5.2)
WBC # BLD AUTO: 4.2 K/UL (ref 3.6–11)

## 2020-05-05 PROCEDURE — 74011250636 HC RX REV CODE- 250/636: Performed by: INTERNAL MEDICINE

## 2020-05-05 PROCEDURE — 74011000250 HC RX REV CODE- 250: Performed by: INTERNAL MEDICINE

## 2020-05-05 PROCEDURE — 85025 COMPLETE CBC W/AUTO DIFF WBC: CPT

## 2020-05-05 PROCEDURE — 36415 COLL VENOUS BLD VENIPUNCTURE: CPT

## 2020-05-05 PROCEDURE — 96417 CHEMO IV INFUS EACH ADDL SEQ: CPT

## 2020-05-05 PROCEDURE — 96375 TX/PRO/DX INJ NEW DRUG ADDON: CPT

## 2020-05-05 PROCEDURE — 96413 CHEMO IV INFUSION 1 HR: CPT

## 2020-05-05 PROCEDURE — 77030012965 HC NDL HUBR BBMI -A

## 2020-05-05 RX ORDER — SODIUM CHLORIDE 9 MG/ML
25 INJECTION, SOLUTION INTRAVENOUS CONTINUOUS
Status: DISPENSED | OUTPATIENT
Start: 2020-05-05 | End: 2020-05-05

## 2020-05-05 RX ORDER — SODIUM CHLORIDE 0.9 % (FLUSH) 0.9 %
10 SYRINGE (ML) INJECTION AS NEEDED
Status: DISPENSED | OUTPATIENT
Start: 2020-05-05 | End: 2020-05-05

## 2020-05-05 RX ORDER — HEPARIN 100 UNIT/ML
300-500 SYRINGE INTRAVENOUS AS NEEDED
Status: ACTIVE | OUTPATIENT
Start: 2020-05-05 | End: 2020-05-05

## 2020-05-05 RX ORDER — SODIUM CHLORIDE 9 MG/ML
10 INJECTION INTRAMUSCULAR; INTRAVENOUS; SUBCUTANEOUS AS NEEDED
Status: ACTIVE | OUTPATIENT
Start: 2020-05-05 | End: 2020-05-05

## 2020-05-05 RX ORDER — DEXAMETHASONE SODIUM PHOSPHATE 4 MG/ML
10 INJECTION, SOLUTION INTRA-ARTICULAR; INTRALESIONAL; INTRAMUSCULAR; INTRAVENOUS; SOFT TISSUE ONCE
Status: COMPLETED | OUTPATIENT
Start: 2020-05-05 | End: 2020-05-05

## 2020-05-05 RX ORDER — DIPHENHYDRAMINE HYDROCHLORIDE 50 MG/ML
50 INJECTION, SOLUTION INTRAMUSCULAR; INTRAVENOUS ONCE
Status: COMPLETED | OUTPATIENT
Start: 2020-05-05 | End: 2020-05-05

## 2020-05-05 RX ADMIN — SODIUM CHLORIDE 25 ML/HR: 900 INJECTION, SOLUTION INTRAVENOUS at 11:36

## 2020-05-05 RX ADMIN — SODIUM CHLORIDE 10 ML: 9 INJECTION, SOLUTION INTRAMUSCULAR; INTRAVENOUS; SUBCUTANEOUS at 11:30

## 2020-05-05 RX ADMIN — DEXAMETHASONE SODIUM PHOSPHATE 10 MG: 4 INJECTION INTRA-ARTICULAR; INTRALESIONAL; INTRAMUSCULAR; INTRAVENOUS; SOFT TISSUE at 11:38

## 2020-05-05 RX ADMIN — PACLITAXEL 178 MG: 6 INJECTION, SOLUTION INTRAVENOUS at 12:36

## 2020-05-05 RX ADMIN — TRASTUZUMAB 210 MG: 150 INJECTION, POWDER, LYOPHILIZED, FOR SOLUTION INTRAVENOUS at 11:48

## 2020-05-05 RX ADMIN — DIPHENHYDRAMINE HYDROCHLORIDE 50 MG: 50 INJECTION INTRAMUSCULAR; INTRAVENOUS at 11:40

## 2020-05-05 RX ADMIN — Medication 10 ML: at 13:40

## 2020-05-05 RX ADMIN — Medication 500 UNITS: at 13:39

## 2020-05-05 RX ADMIN — FAMOTIDINE 20 MG: 10 INJECTION INTRAVENOUS at 11:43

## 2020-05-05 NOTE — PROGRESS NOTES
[facility] VISIT NOTE  Date: May 5, 2020    Name: Sonam Bhatia    MRN: [id number]         : 1950    1045  Ms. Barbara Alves Arrived ambulatory and in no distress for C1D15 of Taxol/Herceptin Regimen. Assessment was completed, no acute issues at this time, no new complaints voiced. Right chest wall port accessed without difficulty, labs drawn & sent for processing. Chemotherapy Flowsheet 2020   Cycle C1D15   Date 2020   Drug / Regimen Taxol/Herceptin   Pre Meds given   Notes given       Vitals:  Patient Vitals for the past 12 hrs:   Temp Pulse BP   20 1337 -- 80 135/63   20 1047 98.8 °F (37.1 °C) 81 123/58        Lab results were obtained and reviewed. Recent Results (from the past 12 hour(s))   CBC WITH 3 PART DIFF    Collection Time: 20 11:00 AM   Result Value Ref Range    WBC 4.2 3.6 - 11.0 K/uL    RBC 3.92 3.80 - 5.20 M/uL    HGB 11.6 11.5 - 16.0 g/dL    HCT 34.3 (L) 35.0 - 47.0 %    MCV 87.5 80.0 - 99.0 FL    MCH 29.6 26.0 - 34.0 PG    MCHC 33.8 30.0 - 36.5 g/dL    RDW 14.6 11.8 - 15.8 %    PLATELET 002 (H) 643 - 400 K/uL    NEUTROPHILS 49 32 - 75 %    MIXED CELLS 5 3.2 - 16.9 %    LYMPHOCYTES 46 12 - 49 %    ABS. NEUTROPHILS 2.1 1.8 - 8.0 K/UL    ABS. MIXED CELLS 0.2 0.2 - 1.2 K/uL    ABS.  LYMPHOCYTES 1.9 0.8 - 3.5 K/UL    DF AUTOMATED         Medications received:  Medications Administered     0.9% sodium chloride infusion     Admin Date  2020 Action  New Bag Dose  25 mL/hr Rate  25 mL/hr Route  IntraVENous Administered By  Brooke Johns RN          0.9% sodium chloride injection 10 mL     Admin Date  2020 Action  Given Dose  10 mL Route  IntraVENous Administered By  Brooke Johns RN          dexamethasone (DECADRON) 4 mg/mL injection 10 mg     Admin Date  2020 Action  Given Dose  10 mg Route  IntraVENous Administered By  Brooke Johns RN          diphenhydrAMINE (BENADRYL) injection 50 mg     Admin Date  2020 Action  Given Dose  50 mg Route  IntraVENous Administered By  Lizeth Gonsalves RN          famotidine (PF) (PEPCID) 20 mg in 0.9% sodium chloride 10 mL injection     Admin Date  05/05/2020 Action  Given Dose  20 mg Route  IntraVENous Administered By  Lizeth Gonsalves RN          heparin (porcine) pf 300-500 Units     Admin Date  05/05/2020 Action  Given Dose  500 Units Route  InterCATHeter Administered By  Lizeth Gonsalves RN          PACLitaxeL (TAXOL) 178 mg in 0.9% sodium chloride 250 mL, overfill volume 25 mL chemo infusion     Admin Date  05/05/2020 Action  New Bag Dose  178 mg Rate  304.7 mL/hr Route  IntraVENous Administered By  Lizeth Gonsalves RN          saline peripheral flush soln 10 mL     Admin Date  05/05/2020 Action  Given Dose  10 mL Route  InterCATHeter Administered By  Lizeth Gonsalves RN          trastuzumab (HERCEPTIN) 210 mg in 0.9% sodium chloride 250 mL, overfill volume 25 mL IVPB     Admin Date  05/05/2020 Action  New Bag Dose  210 mg Rate  570 mL/hr Route  IntraVENous Administered By  Lizeth Gonsalves RN                Ms. David Garcia tolerated treatment well and was discharged from [place] [address] in stable condition at [phone]. Port de-accessed, flushed & heparinized per protocol. She is to return on 5/12/2020 at 10:00 for her next appointment.     Yolie Price RN  May 5, 2020    Future Appointments:  Future Appointments   Date Time Provider Samantha Perlita   5/12/2020 10:00 AM [facility]   5/12/2020 10:30 AM Jayson Johnson  [facility] [address]   5/19/2020  9:30 AM SS INF7 CH2 <1H [facility]   5/26/2020 11:30 AM SS INF5 CH4 <1H [facility]   6/2/2020  9:00 AM [facility]   6/9/2020 10:00 AM SS INF7 CH4 <4H [facility] [address] [place] [address]

## 2020-05-12 ENCOUNTER — OFFICE VISIT (OUTPATIENT)
Dept: ONCOLOGY | Age: 70
End: 2020-05-12

## 2020-05-12 ENCOUNTER — APPOINTMENT (OUTPATIENT)
Dept: INFUSION THERAPY | Age: 70
End: 2020-05-12
Payer: MEDICARE

## 2020-05-12 ENCOUNTER — HOSPITAL ENCOUNTER (OUTPATIENT)
Dept: INFUSION THERAPY | Age: 70
Discharge: HOME | End: 2020-05-12
Payer: MEDICARE

## 2020-05-12 VITALS
RESPIRATION RATE: 18 BRPM | OXYGEN SATURATION: 98 % | DIASTOLIC BLOOD PRESSURE: 63 MMHG | BODY MASS INDEX: 37.04 KG/M2 | HEIGHT: 67 IN | HEART RATE: 84 BPM | WEIGHT: 236 LBS | TEMPERATURE: 98.2 F | SYSTOLIC BLOOD PRESSURE: 102 MMHG

## 2020-05-12 VITALS
TEMPERATURE: 98.2 F | RESPIRATION RATE: 18 BRPM | WEIGHT: 236.9 LBS | DIASTOLIC BLOOD PRESSURE: 64 MMHG | SYSTOLIC BLOOD PRESSURE: 145 MMHG | HEIGHT: 67 IN | HEART RATE: 91 BPM | OXYGEN SATURATION: 98 % | BODY MASS INDEX: 37.18 KG/M2

## 2020-05-12 DIAGNOSIS — C50.911 BILATERAL MALIGNANT NEOPLASM OF BREAST IN FEMALE, ESTROGEN RECEPTOR POSITIVE, UNSPECIFIED SITE OF BREAST (HCC): Primary | ICD-10-CM

## 2020-05-12 DIAGNOSIS — C50.912 BILATERAL MALIGNANT NEOPLASM OF BREAST IN FEMALE, ESTROGEN RECEPTOR POSITIVE, UNSPECIFIED SITE OF BREAST (HCC): Primary | ICD-10-CM

## 2020-05-12 DIAGNOSIS — Z17.0 BILATERAL MALIGNANT NEOPLASM OF BREAST IN FEMALE, ESTROGEN RECEPTOR POSITIVE, UNSPECIFIED SITE OF BREAST (HCC): Primary | ICD-10-CM

## 2020-05-12 DIAGNOSIS — Z51.11 CHEMOTHERAPY MANAGEMENT, ENCOUNTER FOR: ICD-10-CM

## 2020-05-12 LAB
ALBUMIN SERPL-MCNC: 3.2 G/DL (ref 3.5–5)
ALBUMIN/GLOB SERPL: 0.8 {RATIO} (ref 1.1–2.2)
ALP SERPL-CCNC: 113 U/L (ref 45–117)
ALT SERPL-CCNC: 31 U/L (ref 12–78)
ANION GAP SERPL CALC-SCNC: 5 MMOL/L (ref 5–15)
AST SERPL-CCNC: 19 U/L (ref 15–37)
BASO+EOS+MONOS # BLD AUTO: 0.3 K/UL (ref 0.2–1.2)
BASO+EOS+MONOS NFR BLD AUTO: 7 % (ref 3.2–16.9)
BILIRUB SERPL-MCNC: 0.3 MG/DL (ref 0.2–1)
BUN SERPL-MCNC: 14 MG/DL (ref 6–20)
BUN/CREAT SERPL: 12 (ref 12–20)
CALCIUM SERPL-MCNC: 8.6 MG/DL (ref 8.5–10.1)
CHLORIDE SERPL-SCNC: 108 MMOL/L (ref 97–108)
CO2 SERPL-SCNC: 25 MMOL/L (ref 21–32)
CREAT SERPL-MCNC: 1.13 MG/DL (ref 0.55–1.02)
DIFFERENTIAL METHOD BLD: ABNORMAL
ERYTHROCYTE [DISTWIDTH] IN BLOOD BY AUTOMATED COUNT: 15 % (ref 11.8–15.8)
GLOBULIN SER CALC-MCNC: 3.8 G/DL (ref 2–4)
GLUCOSE SERPL-MCNC: 162 MG/DL (ref 65–100)
HCT VFR BLD AUTO: 36.8 % (ref 35–47)
HGB BLD-MCNC: 12.1 G/DL (ref 11.5–16)
LYMPHOCYTES # BLD: 2.2 K/UL (ref 0.8–3.5)
LYMPHOCYTES NFR BLD: 45 % (ref 12–49)
MCH RBC QN AUTO: 29 PG (ref 26–34)
MCHC RBC AUTO-ENTMCNC: 32.9 G/DL (ref 30–36.5)
MCV RBC AUTO: 88.2 FL (ref 80–99)
NEUTS SEG # BLD: 2.4 K/UL (ref 1.8–8)
NEUTS SEG NFR BLD: 49 % (ref 32–75)
PLATELET # BLD AUTO: 461 K/UL (ref 150–400)
POTASSIUM SERPL-SCNC: 4 MMOL/L (ref 3.5–5.1)
PROT SERPL-MCNC: 7 G/DL (ref 6.4–8.2)
RBC # BLD AUTO: 4.17 M/UL (ref 3.8–5.2)
SODIUM SERPL-SCNC: 138 MMOL/L (ref 136–145)
WBC # BLD AUTO: 4.9 K/UL (ref 3.6–11)

## 2020-05-12 PROCEDURE — 85025 COMPLETE CBC W/AUTO DIFF WBC: CPT

## 2020-05-12 PROCEDURE — 36415 COLL VENOUS BLD VENIPUNCTURE: CPT

## 2020-05-12 PROCEDURE — 74011250636 HC RX REV CODE- 250/636: Performed by: INTERNAL MEDICINE

## 2020-05-12 PROCEDURE — 96417 CHEMO IV INFUS EACH ADDL SEQ: CPT

## 2020-05-12 PROCEDURE — 77030012965 HC NDL HUBR BBMI -A

## 2020-05-12 PROCEDURE — 96413 CHEMO IV INFUSION 1 HR: CPT

## 2020-05-12 PROCEDURE — 96375 TX/PRO/DX INJ NEW DRUG ADDON: CPT

## 2020-05-12 PROCEDURE — 74011250636 HC RX REV CODE- 250/636: Performed by: NURSE PRACTITIONER

## 2020-05-12 PROCEDURE — 96361 HYDRATE IV INFUSION ADD-ON: CPT

## 2020-05-12 PROCEDURE — 80053 COMPREHEN METABOLIC PANEL: CPT

## 2020-05-12 RX ORDER — DIPHENHYDRAMINE HYDROCHLORIDE 50 MG/ML
50 INJECTION, SOLUTION INTRAMUSCULAR; INTRAVENOUS ONCE
Status: COMPLETED | OUTPATIENT
Start: 2020-05-12 | End: 2020-05-12

## 2020-05-12 RX ORDER — SODIUM CHLORIDE 0.9 % (FLUSH) 0.9 %
10 SYRINGE (ML) INJECTION AS NEEDED
Status: DISPENSED | OUTPATIENT
Start: 2020-05-12 | End: 2020-05-12

## 2020-05-12 RX ORDER — HEPARIN 100 UNIT/ML
300-500 SYRINGE INTRAVENOUS AS NEEDED
Status: ACTIVE | OUTPATIENT
Start: 2020-05-12 | End: 2020-05-12

## 2020-05-12 RX ORDER — SODIUM CHLORIDE 9 MG/ML
25 INJECTION, SOLUTION INTRAVENOUS CONTINUOUS
Status: DISPENSED | OUTPATIENT
Start: 2020-05-12 | End: 2020-05-12

## 2020-05-12 RX ORDER — DEXAMETHASONE SODIUM PHOSPHATE 10 MG/ML
10 INJECTION INTRAMUSCULAR; INTRAVENOUS ONCE
Status: COMPLETED | OUTPATIENT
Start: 2020-05-12 | End: 2020-05-12

## 2020-05-12 RX ORDER — SODIUM CHLORIDE 9 MG/ML
10 INJECTION INTRAMUSCULAR; INTRAVENOUS; SUBCUTANEOUS AS NEEDED
Status: ACTIVE | OUTPATIENT
Start: 2020-05-12 | End: 2020-05-12

## 2020-05-12 RX ADMIN — FAMOTIDINE 20 MG: 10 INJECTION INTRAVENOUS at 12:52

## 2020-05-12 RX ADMIN — TRASTUZUMAB 210 MG: 150 INJECTION, POWDER, LYOPHILIZED, FOR SOLUTION INTRAVENOUS at 13:06

## 2020-05-12 RX ADMIN — Medication 10 ML: at 14:45

## 2020-05-12 RX ADMIN — SODIUM CHLORIDE 25 ML/HR: 900 INJECTION, SOLUTION INTRAVENOUS at 12:52

## 2020-05-12 RX ADMIN — Medication 500 UNITS: at 14:45

## 2020-05-12 RX ADMIN — SODIUM CHLORIDE 1000 ML: 900 INJECTION, SOLUTION INTRAVENOUS at 11:43

## 2020-05-12 RX ADMIN — PACLITAXEL 178 MG: 6 INJECTION, SOLUTION INTRAVENOUS at 13:44

## 2020-05-12 RX ADMIN — DIPHENHYDRAMINE HYDROCHLORIDE 50 MG: 50 INJECTION INTRAMUSCULAR; INTRAVENOUS at 12:52

## 2020-05-12 RX ADMIN — DEXAMETHASONE SODIUM PHOSPHATE 10 MG: 10 INJECTION INTRAMUSCULAR; INTRAVENOUS at 12:52

## 2020-05-12 NOTE — PROGRESS NOTES
[facility] [place] [facility] [place] [address]  [address] [facility], [address] [facility] [address] [facility]  Yeni Mix: [phone]  F: [phone]      Reason for Visit:   Ace Jaquez is a 71 y.o. female who is seen in consultation at the request of Dr. Joe Higginbotham for evaluation of therapy for breast cancer. Rad onc:  Dr. Gloria Peterson    Treatment History:   · 1/24/20 right breast mass:  9:00 core bx, IMC, gr 1, 5 mm, ER + at 99%, HI + at 97%, HER 2 negative at IHC 0, ki67 8%; left breast mass: 3:00 core bx, IMC, gr 3,  5 mm, ER + at 98%, HI negative, HER 2 POSITIVE at [facility] 3+  · invitae genetic testing:  BRCA2 and RAD50 VUS  · 3/23/20 right breast lumpectomy:  ILC, 7 mm, gr 1, no LVI, 0/8 LN, pT1b pN0 cMo; left breast lumpectomy:  IDC, 1.1 cm, gr 3, 0/2 LN, DCIS present, no LVI, pT1c pN0 cM0  · Paclitaxel/trastuzumab 4/21/20-    History of Present Illness: An abnormal mammogram led to the pathology above    Interval history:  In today for follow up and treatment. No complaints today. FH:  No breast or ovarian cancer, no prostate or pancreas cancer    Past Medical History:   Diagnosis Date    Cancer [facility])       History reviewed. No pertinent surgical history. Social History     Tobacco Use    Smoking status: Never Smoker    Smokeless tobacco: Never Used   Substance Use Topics    Alcohol use: Not Currently      History reviewed. No pertinent family history. Current Outpatient Medications   Medication Sig    ergocalciferol (ERGOCALCIFEROL) 1,250 mcg (50,000 unit) capsule TK 1 C PO 1 TIME A WK    losartan (COZAAR) 50 mg tablet TK 1 T PO QD    omeprazole (PRILOSEC) 20 mg capsule TK 1 C PO QD    rosuvastatin (CRESTOR) 20 mg tablet TK 1 T PO QD    aspirin delayed-release 81 mg tablet Take  by mouth daily.  lidocaine-prilocaine (EMLA) topical cream Apply  to affected area as needed for Pain.  prochlorperazine (COMPAZINE) 10 mg tablet Take 1 Tab by mouth every six (6) hours as needed for Nausea.     ondansetron hcl (ZOFRAN) 8 mg tablet Take 1 Tab by mouth every eight (8) hours as needed for Nausea. No current facility-administered medications for this visit. Facility-Administered Medications Ordered in Other Visits   Medication Dose Route Frequency    saline peripheral flush soln 10 mL  10 mL InterCATHeter PRN    0.9% sodium chloride injection 10 mL  10 mL IntraVENous PRN    heparin (porcine) pf 300-500 Units  300-500 Units InterCATHeter PRN      No Known Allergies     Review of Systems: A complete review of systems was obtained, negative except as described above. Physical Exam:     Visit Vitals  /63 (BP 1 Location: Left arm, BP Patient Position: Sitting)   Pulse 84   Temp 98.2 °F (36.8 °C) (Temporal)   Resp 18   Ht 5' 7\" (1.702 m)   Wt 236 lb (107 kg)   SpO2 98%   BMI 36.96 kg/m²     General: No distress  Eyes: Anicteric sclerae  HENT: Atraumatic  Neck: Supple  Respiratory: Normal respiratory effort  CV: No peripheral edema  GI: nondistended  Skin: No rashes, ecchymoses, or petechiae  Psych: Alert, oriented, appropriate affect, normal judgment/insight      Results:     Lab Results   Component Value Date/Time    WBC 4.9 05/12/2020 10:00 AM    HGB 12.1 05/12/2020 10:00 AM    HCT 36.8 05/12/2020 10:00 AM    PLATELET 958 (H) 62/69/8459 10:00 AM    MCV 88.2 05/12/2020 10:00 AM    ABS. NEUTROPHILS 2.4 05/12/2020 10:00 AM     Lab Results   Component Value Date/Time    Sodium 139 04/21/2020 08:26 AM    Potassium 4.0 04/21/2020 08:26 AM    Chloride 106 04/21/2020 08:26 AM    CO2 27 04/21/2020 08:26 AM    Glucose 123 (H) 04/21/2020 08:26 AM    BUN 14 04/21/2020 08:26 AM    Creatinine 0.91 04/21/2020 08:26 AM    GFR est AA >60 04/21/2020 08:26 AM    GFR est non-AA >60 04/21/2020 08:26 AM    Calcium 9.0 04/21/2020 08:26 AM     Lab Results   Component Value Date/Time    Bilirubin, total 0.5 04/21/2020 08:26 AM    ALT (SGPT) 24 04/21/2020 08:26 AM    AST (SGOT) 13 (L) 04/21/2020 08:26 AM    Alk. phosphatase 112 04/21/2020 08:26 AM    Protein, total 7.4 04/21/2020 08:26 AM    Albumin 3.0 (L) 04/21/2020 08:26 AM    Globulin 4.4 (H) 04/21/2020 08:26 AM       2/11/20 MRI breast  Left:  11 mm mass at 3:00  Right:  9:00, 5 mm mass  No LAD    4/20/20 TTE EF 63%    Records reviewed and summarized above. Pathology report(s) reviewed above. Radiology report(s) reviewed above. Assessment/plan:   1. Left breast IDC, 1.1 cm, 0/2 LN, ER +, MD negative, HER 2 POSITIVE, gr 3:  Stage IA (both anatomic and prognostic)    We explained to the patient that the goal of systemic adjuvant therapy is to improve the chances for cure and decrease the risk of relapse. We explained why a patient can have microscopic cancer spread now even though physical examination, laboratory studies and imaging studies are negative for cancer. We explained that the same treatments used now as adjuvant or preventive treatments rarely if ever are curative in women who develop metastases. Using eprognosis. org, her 5 year all cause mortality risk is only 6-8%; her 10 year all cause mortality risk is 15-23%; her median OS is 17-21 years. An adjuvant conversation is warranted. Discussed weekly paclitaxel 80 mg/m2 with weekly trastuzumab 4 mg/kg load then 2 mg/kg weekly x 12 followed by completion of a year of trastuzumab 6 mg/kg q 3 weeks. This is APT study. I discussed the potential risks of paclitaxel chemotherapy with the patient. Major toxicities include nausea and vomiting, stomatitis, fatigue. Anemia frequently results. Neutropenic fever is uncommon, but can be a life-threatening problem. We provided the patient with detailed information concerning toxicity including frequent toxicities that only last a few days, such as nausea, vomiting, mouth sores, arthralgia, myalgia, and potentially allergic reactions to paclitaxel, as well as toxicities which can be longer lasting including fatigue, anemia and neuropathy.  We provided the patient with detailed information concerning the toxicities of their regimen in addition to our verbal discussion. Rationale for therapy with trastuzumab was also discussed with the patient including a 50% proportional improvement in disease free survival and also an improvement in overall survival in patients receiving trastuzumab and chemotherapy for HER-2 positive breast cancer. The side effects of trastuzumab were discussed including a 4%-5% risk of dropping her ejection fraction while on treatment and about a 1% risk of CHF. We discussed that this drug will be used every 3 weeks for remainder of a year following the chemotherapy cycles. We will check her EF before chemotherapy and every 3 months while she is receiving trastuzumab. After this discussion, she is agreeable to paclitaxel and trastuzumab as above. She has signed informed consent. TTE on 4/20/2020, EF 63%    Paclitaxel/trastuzumab #4 today. Discussed oral and peripheral cryotherapy. She is not interested in cold caps    Dr. Elsa Barkley has placed port    rx for zofran, compazine, emla cream    Following chemotherapy, she is an excellent candidate for radiation and endocrine therapy    2. Right breast ILC, 7 mm, ER +, AR +, HER 2 negative, gr 1, 0/8 LN, stage IA both anatomic and prognostic    We explained to the patient that the goal of systemic adjuvant therapy is to improve the chances for cure and decrease the risk of relapse. We explained why a patient can have microscopic cancer spread now even though physical examination, laboratory studies and imaging studies are negative for cancer. We explained that the same treatments used now as adjuvant or preventive treatments rarely if ever are curative in women who develop metastases. Recommend endocrine therapy, see #1 above.   While she does not quite meet the criteria on this side for CALGB 9343 (76 yo instead of 70), she does meed KIT guidelines for possibly avoiding XRT on this side, though would defer to Dr. Tray Hartley since XRT is indicated on the Left due to HER2 + disease. 3. Emotional well being:  She has excellent support and is coping well with her disease    4. BRCA2 and RAD50 VUS:  Not clinically significant at this time    5. Neck itching:  OTC benadryl cream    6. Obesity:  Will monitor following chemo    7. Increased cr: Will give 1 L IVF today    I appreciate the opportunity to participate in Ms. Chava Silva care. Signed By: Krzysztof Horne MD      No orders of the defined types were placed in this encounter.

## 2020-05-12 NOTE — PROGRESS NOTES
Nahed Kerns is a 71 y.o. female Follow up for the Evaluation of Breast Cancer. 1. Have you been to the ER, urgent care clinic since your last visit? Hospitalized since your last visit? No    2. Have you seen or consulted any other health care providers outside of the [address] [place] [address] since your last visit? Include any pap smears or colon screening.  No

## 2020-05-12 NOTE — PROGRESS NOTES
[facility] Progress Note    Date: May 12, 2020    Name: Radha Segovia    MRN: [id number]         : 1950    Ms. Dutch Fry Arrived ambulatory and in no distress for C2D1 of Herceptin/Taxol Regimen. Assessment was completed, no acute issues at this time, no new complaints voiced. R chest wall port accessed without difficulty, labs drawn & sent for processing. Chemotherapy Flowsheet 2020   Cycle C2D1   Date 2020   Drug / Regimen taxol/herceptin   Pre Meds given   Notes given        Patient proceed to appointment with Dr. Giovanni Cabrera. Ms. Leander Brantley vitals were reviewed. Visit Vitals  /64   Pulse 91   Temp 98.2 °F (36.8 °C)   Resp 18   Ht 5' 7\" (1.702 m)   Wt 107.5 kg (236 lb 14.4 oz)   SpO2 98%   BMI 37.10 kg/m²       Lab results were obtained and reviewed. Recent Results (from the past 12 hour(s))   CBC WITH 3 PART DIFF    Collection Time: 20 10:00 AM   Result Value Ref Range    WBC 4.9 3.6 - 11.0 K/uL    RBC 4.17 3.80 - 5.20 M/uL    HGB 12.1 11.5 - 16.0 g/dL    HCT 36.8 35.0 - 47.0 %    MCV 88.2 80.0 - 99.0 FL    MCH 29.0 26.0 - 34.0 PG    MCHC 32.9 30.0 - 36.5 g/dL    RDW 15.0 11.8 - 15.8 %    PLATELET 958 (H) 941 - 400 K/uL    NEUTROPHILS 49 32 - 75 %    MIXED CELLS 7 3.2 - 16.9 %    LYMPHOCYTES 45 12 - 49 %    ABS. NEUTROPHILS 2.4 1.8 - 8.0 K/UL    ABS. MIXED CELLS 0.3 0.2 - 1.2 K/uL    ABS.  LYMPHOCYTES 2.2 0.8 - 3.5 K/UL    DF AUTOMATED     METABOLIC PANEL, COMPREHENSIVE    Collection Time: 20 10:00 AM   Result Value Ref Range    Sodium 138 136 - 145 mmol/L    Potassium 4.0 3.5 - 5.1 mmol/L    Chloride 108 97 - 108 mmol/L    CO2 25 21 - 32 mmol/L    Anion gap 5 5 - 15 mmol/L    Glucose 162 (H) 65 - 100 mg/dL    BUN 14 6 - 20 MG/DL    Creatinine 1.13 (H) 0.55 - 1.02 MG/DL    BUN/Creatinine ratio 12 12 - 20      GFR est AA 58 (L) >60 ml/min/1.73m2    GFR est non-AA 48 (L) >60 ml/min/1.73m2    Calcium 8.6 8.5 - 10.1 MG/DL    Bilirubin, total 0.3 0.2 - 1.0 MG/DL    ALT (SGPT) 31 12 - 78 U/L    AST (SGOT) 19 15 - 37 U/L    Alk.  phosphatase 113 45 - 117 U/L    Protein, total 7.0 6.4 - 8.2 g/dL    Albumin 3.2 (L) 3.5 - 5.0 g/dL    Globulin 3.8 2.0 - 4.0 g/dL    A-G Ratio 0.8 (L) 1.1 - 2.2         Medications:  Medications Administered     0.9% sodium chloride infusion     Admin Date  05/12/2020 Action  New Bag Dose  25 mL/hr Rate  25 mL/hr Route  IntraVENous Administered By  Basia Martinez RN          dexamethasone (PF) (DECADRON) 10 mg/mL injection 10 mg     Admin Date  05/12/2020 Action  Given Dose  10 mg Route  IntraVENous Administered By  Basia Martinez RN          diphenhydrAMINE (BENADRYL) injection 50 mg     Admin Date  05/12/2020 Action  Given Dose  50 mg Route  IntraVENous Administered By  Basia Martinez RN          famotidine (PF) (PEPCID) 20 mg in 0.9% sodium chloride 10 mL injection     Admin Date  05/12/2020 Action  Given Dose  20 mg Route  IntraVENous Administered By  Basia Martinez RN          heparin (porcine) pf 300-500 Units     Admin Date  05/12/2020 Action  Given Dose  500 Units Route  InterCATHeter Administered By  Basia Martinez RN          PACLitaxeL (TAXOL) 178 mg in 0.9% sodium chloride 250 mL, overfill volume 25 mL chemo infusion     Admin Date  05/12/2020 Action  New Bag Dose  178 mg Rate  304.7 mL/hr Route  IntraVENous Administered By  Basia Martinez RN          saline peripheral flush soln 10 mL     Admin Date  05/12/2020 Action  Given Dose  10 mL Route  InterCATHeter Administered By  Basia Martinez RN          sodium chloride 0.9 % bolus infusion 1,000 mL     Admin Date  05/12/2020 Action  New Bag Dose  1000 mL Rate  1,000 mL/hr Route  IntraVENous Administered By  Basia Martinez RN          trastuzumab (HERCEPTIN) 210 mg in 0.9% sodium chloride 250 mL, overfill volume 25 mL IVPB     Admin Date  05/12/2020 Action  New Bag Dose  210 mg Rate  570 mL/hr Route  IntraVENous Administered By  SHELLI Ramirez Ms. tolerated treatment well and was discharged from [place] [address] in stable condition. Port de-accessed, flushed & heparinized per protocol. She is to return on May 19 at 0930 for her next appointment.     Brenda Gautam RN  May 12, 2020

## 2020-05-19 ENCOUNTER — HOSPITAL ENCOUNTER (OUTPATIENT)
Dept: INFUSION THERAPY | Age: 70
Discharge: HOME | End: 2020-05-19
Payer: MEDICARE

## 2020-05-19 ENCOUNTER — APPOINTMENT (OUTPATIENT)
Dept: INFUSION THERAPY | Age: 70
End: 2020-05-19
Payer: MEDICARE

## 2020-05-19 VITALS
BODY MASS INDEX: 37.09 KG/M2 | TEMPERATURE: 98.6 F | RESPIRATION RATE: 18 BRPM | HEIGHT: 67 IN | DIASTOLIC BLOOD PRESSURE: 77 MMHG | HEART RATE: 77 BPM | OXYGEN SATURATION: 97 % | SYSTOLIC BLOOD PRESSURE: 130 MMHG | WEIGHT: 236.3 LBS

## 2020-05-19 DIAGNOSIS — C50.912 BILATERAL MALIGNANT NEOPLASM OF BREAST IN FEMALE, ESTROGEN RECEPTOR POSITIVE, UNSPECIFIED SITE OF BREAST (HCC): Primary | ICD-10-CM

## 2020-05-19 DIAGNOSIS — Z17.0 BILATERAL MALIGNANT NEOPLASM OF BREAST IN FEMALE, ESTROGEN RECEPTOR POSITIVE, UNSPECIFIED SITE OF BREAST (HCC): Primary | ICD-10-CM

## 2020-05-19 DIAGNOSIS — C50.911 BILATERAL MALIGNANT NEOPLASM OF BREAST IN FEMALE, ESTROGEN RECEPTOR POSITIVE, UNSPECIFIED SITE OF BREAST (HCC): Primary | ICD-10-CM

## 2020-05-19 LAB
ALBUMIN SERPL-MCNC: 3.3 G/DL (ref 3.5–5)
ALBUMIN/GLOB SERPL: 0.9 {RATIO} (ref 1.1–2.2)
ALP SERPL-CCNC: 104 U/L (ref 45–117)
ALT SERPL-CCNC: 35 U/L (ref 12–78)
ANION GAP SERPL CALC-SCNC: 5 MMOL/L (ref 5–15)
AST SERPL-CCNC: 21 U/L (ref 15–37)
BASO+EOS+MONOS # BLD AUTO: 0.3 K/UL (ref 0.2–1.2)
BASO+EOS+MONOS NFR BLD AUTO: 7 % (ref 3.2–16.9)
BILIRUB SERPL-MCNC: 0.4 MG/DL (ref 0.2–1)
BUN SERPL-MCNC: 14 MG/DL (ref 6–20)
BUN/CREAT SERPL: 13 (ref 12–20)
CALCIUM SERPL-MCNC: 9.1 MG/DL (ref 8.5–10.1)
CHLORIDE SERPL-SCNC: 109 MMOL/L (ref 97–108)
CO2 SERPL-SCNC: 26 MMOL/L (ref 21–32)
CREAT SERPL-MCNC: 1.07 MG/DL (ref 0.55–1.02)
DIFFERENTIAL METHOD BLD: NORMAL
ERYTHROCYTE [DISTWIDTH] IN BLOOD BY AUTOMATED COUNT: 15.3 % (ref 11.8–15.8)
GLOBULIN SER CALC-MCNC: 3.8 G/DL (ref 2–4)
GLUCOSE SERPL-MCNC: 159 MG/DL (ref 65–100)
HCT VFR BLD AUTO: 35.2 % (ref 35–47)
HGB BLD-MCNC: 11.7 G/DL (ref 11.5–16)
LYMPHOCYTES # BLD: 2.1 K/UL (ref 0.8–3.5)
LYMPHOCYTES NFR BLD: 43 % (ref 12–49)
MCH RBC QN AUTO: 29.3 PG (ref 26–34)
MCHC RBC AUTO-ENTMCNC: 33.2 G/DL (ref 30–36.5)
MCV RBC AUTO: 88.2 FL (ref 80–99)
NEUTS SEG # BLD: 2.4 K/UL (ref 1.8–8)
NEUTS SEG NFR BLD: 50 % (ref 32–75)
PLATELET # BLD AUTO: 387 K/UL (ref 150–400)
POTASSIUM SERPL-SCNC: 4.3 MMOL/L (ref 3.5–5.1)
PROT SERPL-MCNC: 7.1 G/DL (ref 6.4–8.2)
RBC # BLD AUTO: 3.99 M/UL (ref 3.8–5.2)
SODIUM SERPL-SCNC: 140 MMOL/L (ref 136–145)
WBC # BLD AUTO: 4.8 K/UL (ref 3.6–11)

## 2020-05-19 PROCEDURE — 77030016057 HC NDL HUBR APOL -B

## 2020-05-19 PROCEDURE — 74011250636 HC RX REV CODE- 250/636: Performed by: INTERNAL MEDICINE

## 2020-05-19 PROCEDURE — 96375 TX/PRO/DX INJ NEW DRUG ADDON: CPT

## 2020-05-19 PROCEDURE — 96417 CHEMO IV INFUS EACH ADDL SEQ: CPT

## 2020-05-19 PROCEDURE — 36415 COLL VENOUS BLD VENIPUNCTURE: CPT

## 2020-05-19 PROCEDURE — 74011000250 HC RX REV CODE- 250: Performed by: INTERNAL MEDICINE

## 2020-05-19 PROCEDURE — 96413 CHEMO IV INFUSION 1 HR: CPT

## 2020-05-19 PROCEDURE — 85025 COMPLETE CBC W/AUTO DIFF WBC: CPT

## 2020-05-19 PROCEDURE — 80053 COMPREHEN METABOLIC PANEL: CPT

## 2020-05-19 PROCEDURE — 96361 HYDRATE IV INFUSION ADD-ON: CPT

## 2020-05-19 PROCEDURE — 74011250636 HC RX REV CODE- 250/636: Performed by: NURSE PRACTITIONER

## 2020-05-19 RX ORDER — HEPARIN 100 UNIT/ML
300-500 SYRINGE INTRAVENOUS AS NEEDED
Status: ACTIVE | OUTPATIENT
Start: 2020-05-19 | End: 2020-05-19

## 2020-05-19 RX ORDER — DIPHENHYDRAMINE HYDROCHLORIDE 50 MG/ML
50 INJECTION, SOLUTION INTRAMUSCULAR; INTRAVENOUS ONCE
Status: COMPLETED | OUTPATIENT
Start: 2020-05-19 | End: 2020-05-19

## 2020-05-19 RX ORDER — DEXAMETHASONE SODIUM PHOSPHATE 100 MG/10ML
10 INJECTION INTRAMUSCULAR; INTRAVENOUS ONCE
Status: COMPLETED | OUTPATIENT
Start: 2020-05-19 | End: 2020-05-19

## 2020-05-19 RX ORDER — SODIUM CHLORIDE 0.9 % (FLUSH) 0.9 %
10 SYRINGE (ML) INJECTION AS NEEDED
Status: DISPENSED | OUTPATIENT
Start: 2020-05-19 | End: 2020-05-19

## 2020-05-19 RX ORDER — SODIUM CHLORIDE 9 MG/ML
10 INJECTION INTRAMUSCULAR; INTRAVENOUS; SUBCUTANEOUS AS NEEDED
Status: ACTIVE | OUTPATIENT
Start: 2020-05-19 | End: 2020-05-19

## 2020-05-19 RX ORDER — SODIUM CHLORIDE 9 MG/ML
25 INJECTION, SOLUTION INTRAVENOUS CONTINUOUS
Status: DISPENSED | OUTPATIENT
Start: 2020-05-19 | End: 2020-05-19

## 2020-05-19 RX ADMIN — TRASTUZUMAB 210 MG: 150 INJECTION, POWDER, LYOPHILIZED, FOR SOLUTION INTRAVENOUS at 10:35

## 2020-05-19 RX ADMIN — Medication 500 UNITS: at 13:17

## 2020-05-19 RX ADMIN — PACLITAXEL 178 MG: 6 INJECTION, SOLUTION INTRAVENOUS at 11:14

## 2020-05-19 RX ADMIN — DIPHENHYDRAMINE HYDROCHLORIDE 50 MG: 50 INJECTION INTRAMUSCULAR; INTRAVENOUS at 10:18

## 2020-05-19 RX ADMIN — FAMOTIDINE 20 MG: 10 INJECTION INTRAVENOUS at 10:10

## 2020-05-19 RX ADMIN — Medication 10 ML: at 13:17

## 2020-05-19 RX ADMIN — DEXAMETHASONE SODIUM PHOSPHATE 10 MG: 10 INJECTION INTRAMUSCULAR; INTRAVENOUS at 10:13

## 2020-05-19 RX ADMIN — SODIUM CHLORIDE 25 ML/HR: 900 INJECTION, SOLUTION INTRAVENOUS at 10:10

## 2020-05-19 RX ADMIN — SODIUM CHLORIDE 1000 ML: 900 INJECTION, SOLUTION INTRAVENOUS at 12:14

## 2020-05-19 NOTE — PROGRESS NOTES
[facility] Progress Note    Date: May 19, 2020    Name: Anita Graham    MRN: [id number]         : 1950    Ms. Robertson Speaks Arrived ambulatory and in no distress for cycle 2 day 8 of Taxol Heceptin regimen. Assessment was completed, no acute issues at this time, pt states finger tips are becoming sensitive and lost her hair this week. .  R chest port accessed without difficulty 1 inch bliss, labs drawn and in process. Per MD office ok to order CMP for recheck of Cr level. 1 L NS added for high Cr level. Chemotherapy Flowsheet 2020   Cycle C2D8   Date 2020   Drug / Regimen Taxol/Herceptin   Pre Meds -   Notes -         Ms. Barrios's vitals were reviewed. Visit Vitals  /77   Pulse 77   Temp 98.6 °F (37 °C)   Resp 18   Ht 5' 7\" (1.702 m)   Wt 107.2 kg (236 lb 4.8 oz)   SpO2 97%   BMI 37.01 kg/m²     Lab results were obtained and reviewed. Recent Results (from the past 12 hour(s))   CBC WITH 3 PART DIFF    Collection Time: 20  9:35 AM   Result Value Ref Range    WBC 4.8 3.6 - 11.0 K/uL    RBC 3.99 3.80 - 5.20 M/uL    HGB 11.7 11.5 - 16.0 g/dL    HCT 35.2 35.0 - 47.0 %    MCV 88.2 80.0 - 99.0 FL    MCH 29.3 26.0 - 34.0 PG    MCHC 33.2 30.0 - 36.5 g/dL    RDW 15.3 11.8 - 15.8 %    PLATELET 155 493 - 375 K/uL    NEUTROPHILS 50 32 - 75 %    MIXED CELLS 7 3.2 - 16.9 %    LYMPHOCYTES 43 12 - 49 %    ABS. NEUTROPHILS 2.4 1.8 - 8.0 K/UL    ABS. MIXED CELLS 0.3 0.2 - 1.2 K/uL    ABS.  LYMPHOCYTES 2.1 0.8 - 3.5 K/UL    DF AUTOMATED     METABOLIC PANEL, COMPREHENSIVE    Collection Time: 20  9:48 AM   Result Value Ref Range    Sodium 140 136 - 145 mmol/L    Potassium 4.3 3.5 - 5.1 mmol/L    Chloride 109 (H) 97 - 108 mmol/L    CO2 26 21 - 32 mmol/L    Anion gap 5 5 - 15 mmol/L    Glucose 159 (H) 65 - 100 mg/dL    BUN 14 6 - 20 MG/DL    Creatinine 1.07 (H) 0.55 - 1.02 MG/DL    BUN/Creatinine ratio 13 12 - 20      GFR est AA >60 >60 ml/min/1.73m2    GFR est non-AA 51 (L) >60 ml/min/1.73m2    Calcium 9.1 8.5 - 10.1 MG/DL    Bilirubin, total 0.4 0.2 - 1.0 MG/DL    ALT (SGPT) 35 12 - 78 U/L    AST (SGOT) 21 15 - 37 U/L    Alk. phosphatase 104 45 - 117 U/L    Protein, total 7.1 6.4 - 8.2 g/dL    Albumin 3.3 (L) 3.5 - 5.0 g/dL    Globulin 3.8 2.0 - 4.0 g/dL    A-G Ratio 0.9 (L) 1.1 - 2.2       Pre-medications  were administered as ordered and chemotherapy was initiated.      Medications Administered     0.9% sodium chloride infusion     Admin Date  05/19/2020 Action  New Bag Dose  25 mL/hr Rate  25 mL/hr Route  IntraVENous Administered By  Dorean Pour          dexamethasone (DECADRON) 10 mg/mL injection 10 mg     Admin Date  05/19/2020 Action  Given Dose  10 mg Route  IntraVENous Administered By  Dorean Pour          diphenhydrAMINE (BENADRYL) injection 50 mg     Admin Date  05/19/2020 Action  Given Dose  50 mg Route  IntraVENous Administered By  Dorean Pour          famotidine (PF) (PEPCID) 20 mg in 0.9% sodium chloride 10 mL injection     Admin Date  05/19/2020 Action  Given Dose  20 mg Route  IntraVENous Administered By  Dorean Pour          heparin (porcine) pf 300-500 Units     Admin Date  05/19/2020 Action  Given Dose  500 Units Route  InterCATHeter Administered By  Dorean Pour          PACLitaxeL (TAXOL) 178 mg in 0.9% sodium chloride 250 mL, overfill volume 25 mL chemo infusion     Admin Date  05/19/2020 Action  New Bag Dose  178 mg Rate  304.7 mL/hr Route  IntraVENous Administered By  Dorean Pour          saline peripheral flush soln 10 mL     Admin Date  05/19/2020 Action  Given Dose  10 mL Route  InterCATHeter Administered By  Dorean Pour          sodium chloride 0.9 % bolus infusion 1,000 mL     Admin Date  05/19/2020 Action  New Bag Dose  1000 mL Rate  1,000 mL/hr Route  IntraVENous Administered By  Catrachito Phipps RN          trastuzumab (HERCEPTIN) 210 mg in 0.9% sodium chloride 250 mL, overfill volume 25 mL IVPB     Admin Date  05/19/2020 Action  New Bag Dose  210 mg Rate  570 mL/hr Route  IntraVENous Administered By  Grace Eduardo                  Ms. Gerda Maxwell tolerated treatment well and was discharged from [place] [address] in stable condition. She is to return on 5/26 for her next appointment.     [facility]  May 19, 2020

## 2020-05-26 ENCOUNTER — APPOINTMENT (OUTPATIENT)
Dept: INFUSION THERAPY | Age: 70
End: 2020-05-26
Payer: MEDICARE

## 2020-05-26 ENCOUNTER — OFFICE VISIT (OUTPATIENT)
Dept: ONCOLOGY | Age: 70
End: 2020-05-26

## 2020-05-26 ENCOUNTER — HOSPITAL ENCOUNTER (OUTPATIENT)
Dept: INFUSION THERAPY | Age: 70
Discharge: HOME | End: 2020-05-26
Payer: MEDICARE

## 2020-05-26 VITALS
HEART RATE: 89 BPM | RESPIRATION RATE: 18 BRPM | OXYGEN SATURATION: 98 % | BODY MASS INDEX: 37.04 KG/M2 | TEMPERATURE: 98.7 F | DIASTOLIC BLOOD PRESSURE: 65 MMHG | WEIGHT: 236 LBS | SYSTOLIC BLOOD PRESSURE: 117 MMHG | HEIGHT: 67 IN

## 2020-05-26 VITALS
WEIGHT: 236.4 LBS | HEART RATE: 92 BPM | TEMPERATURE: 98.7 F | BODY MASS INDEX: 37.1 KG/M2 | HEIGHT: 67 IN | SYSTOLIC BLOOD PRESSURE: 133 MMHG | RESPIRATION RATE: 18 BRPM | DIASTOLIC BLOOD PRESSURE: 65 MMHG

## 2020-05-26 DIAGNOSIS — C50.912 BILATERAL MALIGNANT NEOPLASM OF BREAST IN FEMALE, ESTROGEN RECEPTOR POSITIVE, UNSPECIFIED SITE OF BREAST (HCC): Primary | ICD-10-CM

## 2020-05-26 DIAGNOSIS — Z17.0 BILATERAL MALIGNANT NEOPLASM OF BREAST IN FEMALE, ESTROGEN RECEPTOR POSITIVE, UNSPECIFIED SITE OF BREAST (HCC): Primary | ICD-10-CM

## 2020-05-26 DIAGNOSIS — C50.911 BILATERAL MALIGNANT NEOPLASM OF BREAST IN FEMALE, ESTROGEN RECEPTOR POSITIVE, UNSPECIFIED SITE OF BREAST (HCC): Primary | ICD-10-CM

## 2020-05-26 LAB
BASO+EOS+MONOS # BLD AUTO: 0.4 K/UL (ref 0.2–1.2)
BASO+EOS+MONOS NFR BLD AUTO: 8 % (ref 3.2–16.9)
DIFFERENTIAL METHOD BLD: NORMAL
ERYTHROCYTE [DISTWIDTH] IN BLOOD BY AUTOMATED COUNT: 15.5 % (ref 11.8–15.8)
HCT VFR BLD AUTO: 36.3 % (ref 35–47)
HGB BLD-MCNC: 12.4 G/DL (ref 11.5–16)
LYMPHOCYTES # BLD: 1.8 K/UL (ref 0.8–3.5)
LYMPHOCYTES NFR BLD: 38 % (ref 12–49)
MCH RBC QN AUTO: 30.2 PG (ref 26–34)
MCHC RBC AUTO-ENTMCNC: 34.2 G/DL (ref 30–36.5)
MCV RBC AUTO: 88.5 FL (ref 80–99)
NEUTS SEG # BLD: 2.6 K/UL (ref 1.8–8)
NEUTS SEG NFR BLD: 54 % (ref 32–75)
PLATELET # BLD AUTO: 348 K/UL (ref 150–400)
RBC # BLD AUTO: 4.1 M/UL (ref 3.8–5.2)
WBC # BLD AUTO: 4.8 K/UL (ref 3.6–11)

## 2020-05-26 PROCEDURE — 74011250636 HC RX REV CODE- 250/636: Performed by: INTERNAL MEDICINE

## 2020-05-26 PROCEDURE — 96375 TX/PRO/DX INJ NEW DRUG ADDON: CPT

## 2020-05-26 PROCEDURE — 96413 CHEMO IV INFUSION 1 HR: CPT

## 2020-05-26 PROCEDURE — 36415 COLL VENOUS BLD VENIPUNCTURE: CPT

## 2020-05-26 PROCEDURE — 74011000250 HC RX REV CODE- 250: Performed by: INTERNAL MEDICINE

## 2020-05-26 PROCEDURE — 77030012965 HC NDL HUBR BBMI -A

## 2020-05-26 PROCEDURE — 85025 COMPLETE CBC W/AUTO DIFF WBC: CPT

## 2020-05-26 PROCEDURE — 96417 CHEMO IV INFUS EACH ADDL SEQ: CPT

## 2020-05-26 RX ORDER — SODIUM CHLORIDE 9 MG/ML
10 INJECTION INTRAMUSCULAR; INTRAVENOUS; SUBCUTANEOUS AS NEEDED
Status: ACTIVE | OUTPATIENT
Start: 2020-05-26 | End: 2020-05-26

## 2020-05-26 RX ORDER — SODIUM CHLORIDE 0.9 % (FLUSH) 0.9 %
10 SYRINGE (ML) INJECTION AS NEEDED
Status: DISPENSED | OUTPATIENT
Start: 2020-05-26 | End: 2020-05-26

## 2020-05-26 RX ORDER — DEXAMETHASONE SODIUM PHOSPHATE 100 MG/10ML
10 INJECTION INTRAMUSCULAR; INTRAVENOUS ONCE
Status: COMPLETED | OUTPATIENT
Start: 2020-05-26 | End: 2020-05-26

## 2020-05-26 RX ORDER — SODIUM CHLORIDE 9 MG/ML
25 INJECTION, SOLUTION INTRAVENOUS CONTINUOUS
Status: DISCONTINUED | OUTPATIENT
Start: 2020-05-26 | End: 2020-05-27 | Stop reason: HOSPADM

## 2020-05-26 RX ORDER — HEPARIN 100 UNIT/ML
300-500 SYRINGE INTRAVENOUS AS NEEDED
Status: ACTIVE | OUTPATIENT
Start: 2020-05-26 | End: 2020-05-26

## 2020-05-26 RX ORDER — DIPHENHYDRAMINE HYDROCHLORIDE 50 MG/ML
50 INJECTION, SOLUTION INTRAMUSCULAR; INTRAVENOUS ONCE
Status: COMPLETED | OUTPATIENT
Start: 2020-05-26 | End: 2020-05-26

## 2020-05-26 RX ADMIN — DEXAMETHASONE SODIUM PHOSPHATE 10 MG: 10 INJECTION INTRAMUSCULAR; INTRAVENOUS at 12:59

## 2020-05-26 RX ADMIN — Medication 10 ML: at 11:55

## 2020-05-26 RX ADMIN — PACLITAXEL 178 MG: 6 INJECTION, SOLUTION INTRAVENOUS at 14:33

## 2020-05-26 RX ADMIN — DIPHENHYDRAMINE HYDROCHLORIDE 50 MG: 50 INJECTION INTRAMUSCULAR; INTRAVENOUS at 13:06

## 2020-05-26 RX ADMIN — TRASTUZUMAB 210 MG: 150 INJECTION, POWDER, LYOPHILIZED, FOR SOLUTION INTRAVENOUS at 13:50

## 2020-05-26 RX ADMIN — Medication 10 ML: at 15:43

## 2020-05-26 RX ADMIN — Medication 500 UNITS: at 15:43

## 2020-05-26 RX ADMIN — SODIUM CHLORIDE 10 ML: 9 INJECTION INTRAMUSCULAR; INTRAVENOUS; SUBCUTANEOUS at 11:55

## 2020-05-26 RX ADMIN — SODIUM CHLORIDE 25 ML/HR: 900 INJECTION, SOLUTION INTRAVENOUS at 12:50

## 2020-05-26 RX ADMIN — FAMOTIDINE 20 MG: 10 INJECTION INTRAVENOUS at 13:03

## 2020-05-26 NOTE — PROGRESS NOTES
Mandy Clements is a 71 y.o. female Follow up for the Evaluation of Breast Cancer. 1. Have you been to the ER, urgent care clinic since your last visit? Hospitalized since your last visit? No    2. Have you seen or consulted any other health care providers outside of the [address] [place] [address] since your last visit? Include any pap smears or colon screening.  No

## 2020-05-26 NOTE — PROGRESS NOTES
[facility] [place] [facility] [place] [address] [address] [facility] [phone]  F: [phone]      Reason for Visit:   Julieta Bhatt is a 71 y.o. female who is seen in consultation at the request of Dr. Ashlee Bartlett for evaluation of therapy for breast cancer. Rad onc:  Dr. Hilaria Ye    Treatment History:   · 20 right breast mass:  9:00 core bx, IMC, gr 1, 5 mm, ER + at 99%, PA + at 97%, HER 2 negative at IHC 0, ki67 8%; left breast mass: 3:00 core bx, IMC, gr 3,  5 mm, ER + at 98%, PA negative, HER 2 POSITIVE at [facility] 3+  · invitae genetic testing:  BRCA2 and RAD50 VUS  · 3/23/20 right breast lumpectomy:  ILC, 7 mm, gr 1, no LVI, 0/8 LN, pT1b pN0 cMo; left breast lumpectomy:  IDC, 1.1 cm, gr 3, 0/2 LN, DCIS present, no LVI, pT1c pN0 cM0  · Paclitaxel/trastuzumab 20-    History of Present Illness: An abnormal mammogram led to the pathology above    Interval history:  No complaints today    FH: No breast or ovarian cancer, no prostate or pancreas cancer    Past Medical History:   Diagnosis Date    Cancer [facility])       History reviewed. No pertinent surgical history. Social History     Tobacco Use    Smoking status: Never Smoker    Smokeless tobacco: Never Used   Substance Use Topics    Alcohol use: Not Currently      History reviewed. No pertinent family history. Current Outpatient Medications   Medication Sig    ergocalciferol (ERGOCALCIFEROL) 1,250 mcg (50,000 unit) capsule TK 1 C PO 1 TIME A WK    losartan (COZAAR) 50 mg tablet TK 1 T PO QD    omeprazole (PRILOSEC) 20 mg capsule TK 1 C PO QD    rosuvastatin (CRESTOR) 20 mg tablet TK 1 T PO QD    aspirin delayed-release 81 mg tablet Take  by mouth daily.  lidocaine-prilocaine (EMLA) topical cream Apply  to affected area as needed for Pain.  prochlorperazine (COMPAZINE) 10 mg tablet Take 1 Tab by mouth every six (6) hours as needed for Nausea.     ondansetron hcl (ZOFRAN) 8 mg tablet Take 1 Tab by mouth every eight (8) hours as needed for Nausea. No current facility-administered medications for this visit. Facility-Administered Medications Ordered in Other Visits   Medication Dose Route Frequency    saline peripheral flush soln 10 mL  10 mL InterCATHeter PRN    0.9% sodium chloride injection 10 mL  10 mL IntraVENous PRN    heparin (porcine) pf 300-500 Units  300-500 Units InterCATHeter PRN      No Known Allergies     Review of Systems: A complete review of systems was obtained, negative except as described above. Physical Exam:     Visit Vitals  /65 (BP 1 Location: Left arm, BP Patient Position: Sitting)   Pulse 89   Temp 98.7 °F (37.1 °C) (Temporal)   Resp 18   Ht 5' 7\" (1.702 m)   Wt 236 lb (107 kg)   SpO2 98%   BMI 36.96 kg/m²     General: No distress  Eyes: Anicteric sclerae  HENT: Atraumatic  Neck: Supple  Respiratory: Normal respiratory effort  CV: No peripheral edema  GI: nondistended  Skin: No rashes, ecchymoses, or petechiae  Psych: Alert, oriented, appropriate affect, normal judgment/insight      Results:     Lab Results   Component Value Date/Time    WBC 4.8 05/26/2020 11:30 AM    HGB 12.4 05/26/2020 11:30 AM    HCT 36.3 05/26/2020 11:30 AM    PLATELET 387 16/03/7128 11:30 AM    MCV 88.5 05/26/2020 11:30 AM    ABS. NEUTROPHILS 2.6 05/26/2020 11:30 AM     Lab Results   Component Value Date/Time    Sodium 140 05/19/2020 09:48 AM    Potassium 4.3 05/19/2020 09:48 AM    Chloride 109 (H) 05/19/2020 09:48 AM    CO2 26 05/19/2020 09:48 AM    Glucose 159 (H) 05/19/2020 09:48 AM    BUN 14 05/19/2020 09:48 AM    Creatinine 1.07 (H) 05/19/2020 09:48 AM    GFR est AA >60 05/19/2020 09:48 AM    GFR est non-AA 51 (L) 05/19/2020 09:48 AM    Calcium 9.1 05/19/2020 09:48 AM     Lab Results   Component Value Date/Time    Bilirubin, total 0.4 05/19/2020 09:48 AM    ALT (SGPT) 35 05/19/2020 09:48 AM    AST (SGOT) 21 05/19/2020 09:48 AM    Alk.  phosphatase 104 05/19/2020 09:48 AM    Protein, total 7.1 05/19/2020 09:48 AM    Albumin 3.3 (L) 05/19/2020 09:48 AM    Globulin 3.8 05/19/2020 09:48 AM       2/11/20 MRI breast  Left:  11 mm mass at 3:00  Right:  9:00, 5 mm mass  No LAD    4/20/20 TTE EF 63%    Records reviewed and summarized above. Pathology report(s) reviewed above. Radiology report(s) reviewed above. Assessment/plan:   1. Left breast IDC, 1.1 cm, 0/2 LN, ER +, AK negative, HER 2 POSITIVE, gr 3:  Stage IA (both anatomic and prognostic)    We explained to the patient that the goal of systemic adjuvant therapy is to improve the chances for cure and decrease the risk of relapse. We explained why a patient can have microscopic cancer spread now even though physical examination, laboratory studies and imaging studies are negative for cancer. We explained that the same treatments used now as adjuvant or preventive treatments rarely if ever are curative in women who develop metastases. Using eprognosis. org, her 5 year all cause mortality risk is only 6-8%; her 10 year all cause mortality risk is 15-23%; her median OS is 17-21 years. An adjuvant conversation is warranted. Discussed weekly paclitaxel 80 mg/m2 with weekly trastuzumab 4 mg/kg load then 2 mg/kg weekly x 12 followed by completion of a year of trastuzumab 6 mg/kg q 3 weeks. This is APT study. I discussed the potential risks of paclitaxel chemotherapy with the patient. Major toxicities include nausea and vomiting, stomatitis, fatigue. Anemia frequently results. Neutropenic fever is uncommon, but can be a life-threatening problem. We provided the patient with detailed information concerning toxicity including frequent toxicities that only last a few days, such as nausea, vomiting, mouth sores, arthralgia, myalgia, and potentially allergic reactions to paclitaxel, as well as toxicities which can be longer lasting including fatigue, anemia and neuropathy.  We provided the patient with detailed information concerning the toxicities of their regimen in addition to our verbal discussion. Rationale for therapy with trastuzumab was also discussed with the patient including a 50% proportional improvement in disease free survival and also an improvement in overall survival in patients receiving trastuzumab and chemotherapy for HER-2 positive breast cancer. The side effects of trastuzumab were discussed including a 4%-5% risk of dropping her ejection fraction while on treatment and about a 1% risk of CHF. We discussed that this drug will be used every 3 weeks for remainder of a year following the chemotherapy cycles. We will check her EF before chemotherapy and every 3 months while she is receiving trastuzumab. After this discussion, she is agreeable to paclitaxel and trastuzumab as above. She has signed informed consent. TTE on 4/20/2020, EF 63%    Paclitaxel/trastuzumab #6 today. Discussed oral and peripheral cryotherapy. She is not interested in cold caps    Dr. Merlene Joiner has placed port    rx for zofran, compazine, emla cream    Following chemotherapy, she is an excellent candidate for radiation and endocrine therapy    2. Right breast ILC, 7 mm, ER +, LA +, HER 2 negative, gr 1, 0/8 LN, stage IA both anatomic and prognostic    We explained to the patient that the goal of systemic adjuvant therapy is to improve the chances for cure and decrease the risk of relapse. We explained why a patient can have microscopic cancer spread now even though physical examination, laboratory studies and imaging studies are negative for cancer. We explained that the same treatments used now as adjuvant or preventive treatments rarely if ever are curative in women who develop metastases. Recommend endocrine therapy, see #1 above.   While she does not quite meet the criteria on this side for CALGB 9343 (74 yo instead of 70), she does meed KIT guidelines for possibly avoiding XRT on this side, though would defer to Dr. Kasey Stein since XRT is indicated on the Left due to HER2 + disease. 3. Emotional well being:  She has excellent support and is coping well with her disease    4. BRCA2 and RAD50 VUS:  Not clinically significant at this time    5. Neck itching:  resolved    6. Obesity:  Will monitor following chemo    7. Increased cr: Will monitor    I appreciate the opportunity to participate in Ms. Ifeanyi Pantoja care. Signed By: Serene Bowman MD      No orders of the defined types were placed in this encounter.

## 2020-05-26 NOTE — PROGRESS NOTES
[facility] - Chemotherapy Progress Note    3827 Pt admit to [place] for C2D15 Taxol/Herceptin ambulatory in stable condition. Assessment completed. No new concerns voiced. PAC with positive blood return.     Chemotherapy Flowsheet 5/26/2020   Cycle C2D15   Date 5/26/2020   Drug / Regimen Taxol/Herceptin   Pre Meds -   Notes -         VS  Patient Vitals for the past 12 hrs:   Temp Pulse Resp BP   05/26/20 1541 -- 92 -- 133/65   05/26/20 1129 98.7 °F (37.1 °C) 89 18 117/65       Medications:  Medications Administered     0.9% sodium chloride infusion     Admin Date  05/26/2020 Action  New Bag Dose  25 mL/hr Rate  25 mL/hr Route  IntraVENous Administered By  Shayne Lyles RN          0.9% sodium chloride injection 10 mL     Admin Date  05/26/2020 Action  Given Dose  10 mL Route  IntraVENous Administered By  Shayne Lyles RN          dexamethasone (DECADRON) 10 mg/mL injection 10 mg     Admin Date  05/26/2020 Action  Given Dose  10 mg Route  IntraVENous Administered By  Shayne Lyles RN          diphenhydrAMINE (BENADRYL) injection 50 mg     Admin Date  05/26/2020 Action  Given Dose  50 mg Route  IntraVENous Administered By  Shayne Lyles RN          famotidine (PF) (PEPCID) 20 mg in 0.9% sodium chloride 10 mL injection     Admin Date  05/26/2020 Action  Given Dose  20 mg Route  IntraVENous Administered By  Shayne Lyles RN          heparin (porcine) pf 300-500 Units     Admin Date  05/26/2020 Action  Given Dose  500 Units Route  InterCATHeter Administered By  Shayne Lyles RN          PACLitaxeL (TAXOL) 178 mg in 0.9% sodium chloride 250 mL, overfill volume 25 mL chemo infusion     Admin Date  05/26/2020 Action  New Bag Dose  178 mg Rate  304.7 mL/hr Route  IntraVENous Administered By  Shayne Lyles RN          saline peripheral flush soln 10 mL     Admin Date  05/26/2020 Action  Given Dose  10 mL Route  InterCATHeter Administered By  Saintclair Silvan, RN           Admin Date  05/26/2020 Action  Given Dose  10 mL Route  InterCATHeter Administered By  Saintclair Silvan, RN          trastuzumab (HERCEPTIN) 210 mg in 0.9% sodium chloride 250 mL, overfill volume 25 mL IVPB     Admin Date  05/26/2020 Action  New Bag Dose  210 mg Rate  570 mL/hr Route  IntraVENous Administered By  Saintclair Silvan, RN                  7253 Pt tolerated treatment well. PAC maintained positive blood return throughout treatment, flushed with positive blood return at conclusion. D/c home ambulatory in no distress. Pt aware of next appointment scheduled for 6/2 at 0900. Labs  Recent Results (from the past 12 hour(s))   CBC WITH 3 PART DIFF    Collection Time: 05/26/20 11:30 AM   Result Value Ref Range    WBC 4.8 3.6 - 11.0 K/uL    RBC 4.10 3.80 - 5.20 M/uL    HGB 12.4 11.5 - 16.0 g/dL    HCT 36.3 35.0 - 47.0 %    MCV 88.5 80.0 - 99.0 FL    MCH 30.2 26.0 - 34.0 PG    MCHC 34.2 30.0 - 36.5 g/dL    RDW 15.5 11.8 - 15.8 %    PLATELET 408 586 - 936 K/uL    NEUTROPHILS 54 32 - 75 %    MIXED CELLS 8 3.2 - 16.9 %    LYMPHOCYTES 38 12 - 49 %    ABS. NEUTROPHILS 2.6 1.8 - 8.0 K/UL    ABS. MIXED CELLS 0.4 0.2 - 1.2 K/uL    ABS.  LYMPHOCYTES 1.8 0.8 - 3.5 K/UL    DF AUTOMATED

## 2020-06-02 ENCOUNTER — APPOINTMENT (OUTPATIENT)
Dept: INFUSION THERAPY | Age: 70
End: 2020-06-02
Payer: MEDICARE

## 2020-06-02 ENCOUNTER — HOSPITAL ENCOUNTER (OUTPATIENT)
Dept: INFUSION THERAPY | Age: 70
Discharge: HOME | End: 2020-06-02
Payer: MEDICARE

## 2020-06-02 VITALS
RESPIRATION RATE: 16 BRPM | SYSTOLIC BLOOD PRESSURE: 138 MMHG | HEART RATE: 84 BPM | BODY MASS INDEX: 37.12 KG/M2 | WEIGHT: 236.5 LBS | HEIGHT: 67 IN | DIASTOLIC BLOOD PRESSURE: 64 MMHG | TEMPERATURE: 98.5 F

## 2020-06-02 DIAGNOSIS — C50.912 BILATERAL MALIGNANT NEOPLASM OF BREAST IN FEMALE, ESTROGEN RECEPTOR POSITIVE, UNSPECIFIED SITE OF BREAST (HCC): Primary | ICD-10-CM

## 2020-06-02 DIAGNOSIS — Z17.0 BILATERAL MALIGNANT NEOPLASM OF BREAST IN FEMALE, ESTROGEN RECEPTOR POSITIVE, UNSPECIFIED SITE OF BREAST (HCC): Primary | ICD-10-CM

## 2020-06-02 DIAGNOSIS — C50.911 BILATERAL MALIGNANT NEOPLASM OF BREAST IN FEMALE, ESTROGEN RECEPTOR POSITIVE, UNSPECIFIED SITE OF BREAST (HCC): Primary | ICD-10-CM

## 2020-06-02 LAB
ALBUMIN SERPL-MCNC: 3.2 G/DL (ref 3.5–5)
ALBUMIN/GLOB SERPL: 0.9 {RATIO} (ref 1.1–2.2)
ALP SERPL-CCNC: 89 U/L (ref 45–117)
ALT SERPL-CCNC: 38 U/L (ref 12–78)
ANION GAP SERPL CALC-SCNC: 7 MMOL/L (ref 5–15)
AST SERPL-CCNC: 20 U/L (ref 15–37)
BASO+EOS+MONOS # BLD AUTO: 0.3 K/UL (ref 0.2–1.2)
BASO+EOS+MONOS NFR BLD AUTO: 7 % (ref 3.2–16.9)
BILIRUB SERPL-MCNC: 0.4 MG/DL (ref 0.2–1)
BUN SERPL-MCNC: 14 MG/DL (ref 6–20)
BUN/CREAT SERPL: 14 (ref 12–20)
CALCIUM SERPL-MCNC: 8.8 MG/DL (ref 8.5–10.1)
CHLORIDE SERPL-SCNC: 109 MMOL/L (ref 97–108)
CO2 SERPL-SCNC: 25 MMOL/L (ref 21–32)
CREAT SERPL-MCNC: 1.03 MG/DL (ref 0.55–1.02)
DIFFERENTIAL METHOD BLD: ABNORMAL
ERYTHROCYTE [DISTWIDTH] IN BLOOD BY AUTOMATED COUNT: 15.6 % (ref 11.8–15.8)
GLOBULIN SER CALC-MCNC: 3.4 G/DL (ref 2–4)
GLUCOSE SERPL-MCNC: 193 MG/DL (ref 65–100)
HCT VFR BLD AUTO: 34.3 % (ref 35–47)
HGB BLD-MCNC: 11.9 G/DL (ref 11.5–16)
LYMPHOCYTES # BLD: 1.6 K/UL (ref 0.8–3.5)
LYMPHOCYTES NFR BLD: 41 % (ref 12–49)
MCH RBC QN AUTO: 30.5 PG (ref 26–34)
MCHC RBC AUTO-ENTMCNC: 34.7 G/DL (ref 30–36.5)
MCV RBC AUTO: 87.9 FL (ref 80–99)
NEUTS SEG # BLD: 2 K/UL (ref 1.8–8)
NEUTS SEG NFR BLD: 52 % (ref 32–75)
PLATELET # BLD AUTO: 313 K/UL (ref 150–400)
POTASSIUM SERPL-SCNC: 3.7 MMOL/L (ref 3.5–5.1)
PROT SERPL-MCNC: 6.6 G/DL (ref 6.4–8.2)
RBC # BLD AUTO: 3.9 M/UL (ref 3.8–5.2)
SODIUM SERPL-SCNC: 141 MMOL/L (ref 136–145)
WBC # BLD AUTO: 3.9 K/UL (ref 3.6–11)

## 2020-06-02 PROCEDURE — 36415 COLL VENOUS BLD VENIPUNCTURE: CPT

## 2020-06-02 PROCEDURE — 96413 CHEMO IV INFUSION 1 HR: CPT

## 2020-06-02 PROCEDURE — 96375 TX/PRO/DX INJ NEW DRUG ADDON: CPT

## 2020-06-02 PROCEDURE — 85025 COMPLETE CBC W/AUTO DIFF WBC: CPT

## 2020-06-02 PROCEDURE — 74011000250 HC RX REV CODE- 250: Performed by: INTERNAL MEDICINE

## 2020-06-02 PROCEDURE — 77030016057 HC NDL HUBR APOL -B

## 2020-06-02 PROCEDURE — 96417 CHEMO IV INFUS EACH ADDL SEQ: CPT

## 2020-06-02 PROCEDURE — 74011250636 HC RX REV CODE- 250/636: Performed by: INTERNAL MEDICINE

## 2020-06-02 PROCEDURE — 80053 COMPREHEN METABOLIC PANEL: CPT

## 2020-06-02 RX ORDER — SODIUM CHLORIDE 9 MG/ML
25 INJECTION, SOLUTION INTRAVENOUS CONTINUOUS
Status: DISPENSED | OUTPATIENT
Start: 2020-06-02 | End: 2020-06-02

## 2020-06-02 RX ORDER — DEXAMETHASONE SODIUM PHOSPHATE 100 MG/10ML
10 INJECTION INTRAMUSCULAR; INTRAVENOUS ONCE
Status: COMPLETED | OUTPATIENT
Start: 2020-06-02 | End: 2020-06-02

## 2020-06-02 RX ORDER — SODIUM CHLORIDE 0.9 % (FLUSH) 0.9 %
10 SYRINGE (ML) INJECTION AS NEEDED
Status: DISPENSED | OUTPATIENT
Start: 2020-06-02 | End: 2020-06-02

## 2020-06-02 RX ORDER — SODIUM CHLORIDE 9 MG/ML
10 INJECTION INTRAMUSCULAR; INTRAVENOUS; SUBCUTANEOUS AS NEEDED
Status: ACTIVE | OUTPATIENT
Start: 2020-06-02 | End: 2020-06-02

## 2020-06-02 RX ORDER — HEPARIN 100 UNIT/ML
300-500 SYRINGE INTRAVENOUS AS NEEDED
Status: ACTIVE | OUTPATIENT
Start: 2020-06-02 | End: 2020-06-02

## 2020-06-02 RX ORDER — DIPHENHYDRAMINE HYDROCHLORIDE 50 MG/ML
50 INJECTION, SOLUTION INTRAMUSCULAR; INTRAVENOUS ONCE
Status: COMPLETED | OUTPATIENT
Start: 2020-06-02 | End: 2020-06-02

## 2020-06-02 RX ADMIN — SODIUM CHLORIDE 10 ML: 9 INJECTION INTRAMUSCULAR; INTRAVENOUS; SUBCUTANEOUS at 09:00

## 2020-06-02 RX ADMIN — Medication 10 ML: at 11:41

## 2020-06-02 RX ADMIN — PACLITAXEL 178 MG: 6 INJECTION, SOLUTION INTRAVENOUS at 10:37

## 2020-06-02 RX ADMIN — SODIUM CHLORIDE 25 ML/HR: 900 INJECTION, SOLUTION INTRAVENOUS at 09:27

## 2020-06-02 RX ADMIN — DIPHENHYDRAMINE HYDROCHLORIDE 50 MG: 50 INJECTION INTRAMUSCULAR; INTRAVENOUS at 09:33

## 2020-06-02 RX ADMIN — FAMOTIDINE 20 MG: 10 INJECTION INTRAVENOUS at 09:28

## 2020-06-02 RX ADMIN — Medication 10 ML: at 09:00

## 2020-06-02 RX ADMIN — DEXAMETHASONE SODIUM PHOSPHATE 10 MG: 10 INJECTION INTRAMUSCULAR; INTRAVENOUS at 09:30

## 2020-06-02 RX ADMIN — HEPARIN 500 UNITS: 100 SYRINGE at 11:41

## 2020-06-02 RX ADMIN — TRASTUZUMAB 210 MG: 150 INJECTION, POWDER, LYOPHILIZED, FOR SOLUTION INTRAVENOUS at 09:55

## 2020-06-02 NOTE — PROGRESS NOTES
[facility] Progress Note    Date: 2020    Name: Robert Warren    MRN: [id number]         : 1950    Ms. Kyler López Arrived ambulatory and in no distress for C3D1 of Taxol/Herceptin Regimen. Assessment was completed, no acute issues at this time, no new complaints voiced. Right chest wall port accessed without difficulty, labs drawn & sent for processing. EF within treatment parameters. Chemotherapy Flowsheet 2020   Cycle C3D1   Date 2020   Drug / Regimen Taxol/Herceptin   Pre Meds -   Notes -     Ms. Barrios's vitals were reviewed. Visit Vitals  /66   Pulse 93   Temp 98.5 °F (36.9 °C)   Resp 16   Ht 5' 7\" (1.702 m)   Wt 107.3 kg (236 lb 8 oz)   Breastfeeding No   BMI 37.04 kg/m²       Lab results were obtained and reviewed. Recent Results (from the past 12 hour(s))   CBC WITH 3 PART DIFF    Collection Time: 20  8:59 AM   Result Value Ref Range    WBC 3.9 3.6 - 11.0 K/uL    RBC 3.90 3.80 - 5.20 M/uL    HGB 11.9 11.5 - 16.0 g/dL    HCT 34.3 (L) 35.0 - 47.0 %    MCV 87.9 80.0 - 99.0 FL    MCH 30.5 26.0 - 34.0 PG    MCHC 34.7 30.0 - 36.5 g/dL    RDW 15.6 11.8 - 15.8 %    PLATELET 136 253 - 564 K/uL    NEUTROPHILS 52 32 - 75 %    MIXED CELLS 7 3.2 - 16.9 %    LYMPHOCYTES 41 12 - 49 %    ABS. NEUTROPHILS 2.0 1.8 - 8.0 K/UL    ABS. MIXED CELLS 0.3 0.2 - 1.2 K/uL    ABS.  LYMPHOCYTES 1.6 0.8 - 3.5 K/UL    DF AUTOMATED     METABOLIC PANEL, COMPREHENSIVE    Collection Time: 20  8:59 AM   Result Value Ref Range    Sodium 141 136 - 145 mmol/L    Potassium 3.7 3.5 - 5.1 mmol/L    Chloride 109 (H) 97 - 108 mmol/L    CO2 25 21 - 32 mmol/L    Anion gap 7 5 - 15 mmol/L    Glucose 193 (H) 65 - 100 mg/dL    BUN 14 6 - 20 MG/DL    Creatinine 1.03 (H) 0.55 - 1.02 MG/DL    BUN/Creatinine ratio 14 12 - 20      GFR est AA >60 >60 ml/min/1.73m2    GFR est non-AA 53 (L) >60 ml/min/1.73m2    Calcium 8.8 8.5 - 10.1 MG/DL    Bilirubin, total 0.4 0.2 - 1.0 MG/DL    ALT (SGPT) 38 12 - 78 U/L    AST (SGOT) 20 15 - 37 U/L    Alk. phosphatase 89 45 - 117 U/L    Protein, total 6.6 6.4 - 8.2 g/dL    Albumin 3.2 (L) 3.5 - 5.0 g/dL    Globulin 3.4 2.0 - 4.0 g/dL    A-G Ratio 0.9 (L) 1.1 - 2.2       Labs within treatment parameters.      Medications:  Medications Administered     0.9% sodium chloride infusion     Admin Date  06/02/2020 Action  New Bag Dose  25 mL/hr Rate  25 mL/hr Route  IntraVENous Administered By  Ted Lomeli RN          0.9% sodium chloride injection 10 mL     Admin Date  06/02/2020 Action  Given Dose  10 mL Route  IntraVENous Administered By  Ted Lomeli RN          dexamethasone (DECADRON) 10 mg/mL injection 10 mg     Admin Date  06/02/2020 Action  Given Dose  10 mg Route  IntraVENous Administered By  Ted Lomeli RN          diphenhydrAMINE (BENADRYL) injection 50 mg     Admin Date  06/02/2020 Action  Given Dose  50 mg Route  IntraVENous Administered By  Ted Lomeli RN          famotidine (PF) (PEPCID) 20 mg in 0.9% sodium chloride 10 mL injection     Admin Date  06/02/2020 Action  Given Dose  20 mg Route  IntraVENous Administered By  Ted Lomeli RN          heparin (porcine) pf 300-500 Units     Admin Date  06/02/2020 Action  Given Dose  500 Units Route  InterCATHeter Administered By  Ted Lomeli RN          PACLitaxeL (TAXOL) 178 mg in 0.9% sodium chloride 250 mL, overfill volume 25 mL chemo infusion     Admin Date  06/02/2020 Action  New Bag Dose  178 mg Rate  304.7 mL/hr Route  IntraVENous Administered By  Ted Lomeli RN          saline peripheral flush soln 10 mL     Admin Date  06/02/2020 Action  Given Dose  10 mL Route  InterCATHeter Administered By  Ted Lomeli RN           Admin Date  06/02/2020 Action  Given Dose  10 mL Route  InterCATHeter Administered By  Ted Lomeli RN          trastuzumab (HERCEPTIN) 210 mg in 0.9% sodium chloride 250 mL, overfill volume 25 mL IVPB     Admin Date  06/02/2020 Action  New Bag Dose  210 mg Rate  570 mL/hr Route  IntraVENous Administered By  Roney Alvarez RN                  Ms. Gilles Connor tolerated treatment well and was discharged from [place] [address] in stable condition at 1145. Port de-accessed, flushed & heparinized per protocol. She is to return on June 9 at 1000 for her next appointment.     Bala Pollard RN  June 2, 2020

## 2020-06-09 ENCOUNTER — OFFICE VISIT (OUTPATIENT)
Dept: ONCOLOGY | Age: 70
End: 2020-06-09

## 2020-06-09 ENCOUNTER — HOSPITAL ENCOUNTER (OUTPATIENT)
Dept: INFUSION THERAPY | Age: 70
Discharge: HOME | End: 2020-06-09
Payer: MEDICARE

## 2020-06-09 ENCOUNTER — APPOINTMENT (OUTPATIENT)
Dept: INFUSION THERAPY | Age: 70
End: 2020-06-09
Payer: MEDICARE

## 2020-06-09 VITALS
OXYGEN SATURATION: 96 % | BODY MASS INDEX: 37.28 KG/M2 | RESPIRATION RATE: 18 BRPM | HEART RATE: 70 BPM | HEIGHT: 67 IN | SYSTOLIC BLOOD PRESSURE: 163 MMHG | DIASTOLIC BLOOD PRESSURE: 70 MMHG | TEMPERATURE: 97.1 F | WEIGHT: 237.5 LBS

## 2020-06-09 VITALS
SYSTOLIC BLOOD PRESSURE: 122 MMHG | HEIGHT: 67 IN | RESPIRATION RATE: 18 BRPM | DIASTOLIC BLOOD PRESSURE: 60 MMHG | OXYGEN SATURATION: 96 % | HEART RATE: 70 BPM | BODY MASS INDEX: 37.2 KG/M2 | TEMPERATURE: 97.1 F | WEIGHT: 237 LBS

## 2020-06-09 DIAGNOSIS — C50.912 BILATERAL MALIGNANT NEOPLASM OF BREAST IN FEMALE, ESTROGEN RECEPTOR POSITIVE, UNSPECIFIED SITE OF BREAST (HCC): Primary | ICD-10-CM

## 2020-06-09 DIAGNOSIS — C50.911 BILATERAL MALIGNANT NEOPLASM OF BREAST IN FEMALE, ESTROGEN RECEPTOR POSITIVE, UNSPECIFIED SITE OF BREAST (HCC): Primary | ICD-10-CM

## 2020-06-09 DIAGNOSIS — R79.89 ELEVATED SERUM CREATININE: ICD-10-CM

## 2020-06-09 DIAGNOSIS — Z17.0 BILATERAL MALIGNANT NEOPLASM OF BREAST IN FEMALE, ESTROGEN RECEPTOR POSITIVE, UNSPECIFIED SITE OF BREAST (HCC): Primary | ICD-10-CM

## 2020-06-09 DIAGNOSIS — Z51.11 CHEMOTHERAPY MANAGEMENT, ENCOUNTER FOR: ICD-10-CM

## 2020-06-09 LAB
ALBUMIN SERPL-MCNC: 3.2 G/DL (ref 3.5–5)
ALBUMIN/GLOB SERPL: 0.9 {RATIO} (ref 1.1–2.2)
ALP SERPL-CCNC: 83 U/L (ref 45–117)
ALT SERPL-CCNC: 30 U/L (ref 12–78)
ANION GAP SERPL CALC-SCNC: 3 MMOL/L (ref 5–15)
AST SERPL-CCNC: 19 U/L (ref 15–37)
BASO+EOS+MONOS # BLD AUTO: 0.3 K/UL (ref 0.2–1.2)
BASO+EOS+MONOS NFR BLD AUTO: 6 % (ref 3.2–16.9)
BILIRUB SERPL-MCNC: 0.3 MG/DL (ref 0.2–1)
BUN SERPL-MCNC: 13 MG/DL (ref 6–20)
BUN/CREAT SERPL: 13 (ref 12–20)
CALCIUM SERPL-MCNC: 8.6 MG/DL (ref 8.5–10.1)
CHLORIDE SERPL-SCNC: 108 MMOL/L (ref 97–108)
CO2 SERPL-SCNC: 27 MMOL/L (ref 21–32)
CREAT SERPL-MCNC: 0.97 MG/DL (ref 0.55–1.02)
DIFFERENTIAL METHOD BLD: ABNORMAL
ERYTHROCYTE [DISTWIDTH] IN BLOOD BY AUTOMATED COUNT: 16.7 % (ref 11.8–15.8)
GLOBULIN SER CALC-MCNC: 3.6 G/DL (ref 2–4)
GLUCOSE SERPL-MCNC: 128 MG/DL (ref 65–100)
HCT VFR BLD AUTO: 33.3 % (ref 35–47)
HGB BLD-MCNC: 11.3 G/DL (ref 11.5–16)
LYMPHOCYTES # BLD: 2 K/UL (ref 0.8–3.5)
LYMPHOCYTES NFR BLD: 39 % (ref 12–49)
MCH RBC QN AUTO: 30.3 PG (ref 26–34)
MCHC RBC AUTO-ENTMCNC: 33.9 G/DL (ref 30–36.5)
MCV RBC AUTO: 89.3 FL (ref 80–99)
NEUTS SEG # BLD: 2.7 K/UL (ref 1.8–8)
NEUTS SEG NFR BLD: 56 % (ref 32–75)
PLATELET # BLD AUTO: 339 K/UL (ref 150–400)
POTASSIUM SERPL-SCNC: 3.7 MMOL/L (ref 3.5–5.1)
PROT SERPL-MCNC: 6.8 G/DL (ref 6.4–8.2)
RBC # BLD AUTO: 3.73 M/UL (ref 3.8–5.2)
SODIUM SERPL-SCNC: 138 MMOL/L (ref 136–145)
WBC # BLD AUTO: 5 K/UL (ref 3.6–11)

## 2020-06-09 PROCEDURE — 80053 COMPREHEN METABOLIC PANEL: CPT

## 2020-06-09 PROCEDURE — 96413 CHEMO IV INFUSION 1 HR: CPT

## 2020-06-09 PROCEDURE — 36591 DRAW BLOOD OFF VENOUS DEVICE: CPT

## 2020-06-09 PROCEDURE — 96375 TX/PRO/DX INJ NEW DRUG ADDON: CPT

## 2020-06-09 PROCEDURE — 85025 COMPLETE CBC W/AUTO DIFF WBC: CPT

## 2020-06-09 PROCEDURE — 36415 COLL VENOUS BLD VENIPUNCTURE: CPT

## 2020-06-09 PROCEDURE — 74011250636 HC RX REV CODE- 250/636: Performed by: INTERNAL MEDICINE

## 2020-06-09 PROCEDURE — 96417 CHEMO IV INFUS EACH ADDL SEQ: CPT

## 2020-06-09 PROCEDURE — 77030016057 HC NDL HUBR APOL -B

## 2020-06-09 RX ORDER — DIPHENHYDRAMINE HYDROCHLORIDE 50 MG/ML
50 INJECTION, SOLUTION INTRAMUSCULAR; INTRAVENOUS ONCE
Status: COMPLETED | OUTPATIENT
Start: 2020-06-09 | End: 2020-06-09

## 2020-06-09 RX ORDER — SODIUM CHLORIDE 0.9 % (FLUSH) 0.9 %
10 SYRINGE (ML) INJECTION AS NEEDED
Status: DISPENSED | OUTPATIENT
Start: 2020-06-09 | End: 2020-06-09

## 2020-06-09 RX ORDER — SODIUM CHLORIDE 9 MG/ML
25 INJECTION, SOLUTION INTRAVENOUS CONTINUOUS
Status: DISPENSED | OUTPATIENT
Start: 2020-06-09 | End: 2020-06-09

## 2020-06-09 RX ORDER — DEXAMETHASONE SODIUM PHOSPHATE 4 MG/ML
10 INJECTION, SOLUTION INTRA-ARTICULAR; INTRALESIONAL; INTRAMUSCULAR; INTRAVENOUS; SOFT TISSUE ONCE
Status: COMPLETED | OUTPATIENT
Start: 2020-06-09 | End: 2020-06-09

## 2020-06-09 RX ORDER — HEPARIN 100 UNIT/ML
300-500 SYRINGE INTRAVENOUS AS NEEDED
Status: ACTIVE | OUTPATIENT
Start: 2020-06-09 | End: 2020-06-09

## 2020-06-09 RX ORDER — SODIUM CHLORIDE 9 MG/ML
10 INJECTION INTRAMUSCULAR; INTRAVENOUS; SUBCUTANEOUS AS NEEDED
Status: ACTIVE | OUTPATIENT
Start: 2020-06-09 | End: 2020-06-09

## 2020-06-09 RX ADMIN — DIPHENHYDRAMINE HYDROCHLORIDE 50 MG: 50 INJECTION INTRAMUSCULAR; INTRAVENOUS at 11:53

## 2020-06-09 RX ADMIN — TRASTUZUMAB 210 MG: 150 INJECTION, POWDER, LYOPHILIZED, FOR SOLUTION INTRAVENOUS at 12:00

## 2020-06-09 RX ADMIN — PACLITAXEL 178 MG: 6 INJECTION, SOLUTION INTRAVENOUS at 12:42

## 2020-06-09 RX ADMIN — DEXAMETHASONE SODIUM PHOSPHATE 10 MG: 4 INJECTION INTRA-ARTICULAR; INTRALESIONAL; INTRAMUSCULAR; INTRAVENOUS; SOFT TISSUE at 11:51

## 2020-06-09 RX ADMIN — Medication 10 ML: at 13:52

## 2020-06-09 RX ADMIN — SODIUM CHLORIDE 25 ML/HR: 900 INJECTION, SOLUTION INTRAVENOUS at 11:45

## 2020-06-09 RX ADMIN — HEPARIN 500 UNITS: 100 SYRINGE at 13:53

## 2020-06-09 RX ADMIN — FAMOTIDINE 20 MG: 10 INJECTION INTRAVENOUS at 11:49

## 2020-06-09 NOTE — PROGRESS NOTES
[facility] [place] at [facility]  [address] [facility], [address]  Matthew Vitaly: [phone]  F: [phone]      Reason for Visit:   Vickey Saldivar is a 71 y.o. female who is seen in consultation at the request of Dr. Jason Lopez for evaluation of therapy for breast cancer. Rad onc:  Dr. Todd Black    Treatment History:   · 1/24/20 right breast mass:  9:00 core bx, IMC, gr 1, 5 mm, ER + at 99%, OR + at 97%, HER 2 negative at IHC 0, ki67 8%; left breast mass: 3:00 core bx, IMC, gr 3,  5 mm, ER + at 98%, OR negative, HER 2 POSITIVE at [facility] 3+  · invitae genetic testing:  BRCA2 and RAD50 VUS  · 3/23/20 right breast lumpectomy:  ILC, 7 mm, gr 1, no LVI, 0/8 LN, pT1b pN0 cMo; left breast lumpectomy:  IDC, 1.1 cm, gr 3, 0/2 LN, DCIS present, no LVI, pT1c pN0 cM0  · Paclitaxel/trastuzumab 4/21/20-    History of Present Illness: An abnormal mammogram led to the pathology above    Interval history:  In today for follow up and treatment. Complains of gr 1 hot flashes, gr 1 neuropathy. FH:  No breast or ovarian cancer, no prostate or pancreas cancer    Past Medical History:   Diagnosis Date    Cancer [facility])       History reviewed. No pertinent surgical history. Social History     Tobacco Use    Smoking status: Never Smoker    Smokeless tobacco: Never Used   Substance Use Topics    Alcohol use: Not Currently      History reviewed. No pertinent family history. Current Outpatient Medications   Medication Sig    ergocalciferol (ERGOCALCIFEROL) 1,250 mcg (50,000 unit) capsule TK 1 C PO 1 TIME A WK    losartan (COZAAR) 50 mg tablet TK 1 T PO QD    omeprazole (PRILOSEC) 20 mg capsule TK 1 C PO QD    rosuvastatin (CRESTOR) 20 mg tablet TK 1 T PO QD    aspirin delayed-release 81 mg tablet Take  by mouth daily.  lidocaine-prilocaine (EMLA) topical cream Apply  to affected area as needed for Pain.     prochlorperazine (COMPAZINE) 10 mg tablet Take 1 Tab by mouth every six (6) hours as needed for Nausea.  ondansetron hcl (ZOFRAN) 8 mg tablet Take 1 Tab by mouth every eight (8) hours as needed for Nausea. No current facility-administered medications for this visit. No Known Allergies     Review of Systems: A complete review of systems was obtained, negative except as described above. Physical Exam:     Visit Vitals  /60 (BP 1 Location: Left arm, BP Patient Position: Sitting)   Pulse 70   Temp 97.1 °F (36.2 °C) (Temporal)   Resp 18   Ht 5' 7\" (1.702 m)   Wt 237 lb (107.5 kg)   SpO2 96%   BMI 37.12 kg/m²     General: No distress  Eyes: Anicteric sclerae  HENT: Atraumatic  Neck: Supple  Respiratory: Normal respiratory effort  CV: No peripheral edema  GI: nondistended  Skin: No rashes, ecchymoses, or petechiae  Psych: Alert, oriented, appropriate affect, normal judgment/insight      Results:     Lab Results   Component Value Date/Time    WBC 5.0 06/09/2020 09:40 AM    HGB 11.3 (L) 06/09/2020 09:40 AM    HCT 33.3 (L) 06/09/2020 09:40 AM    PLATELET 798 62/87/6303 09:40 AM    MCV 89.3 06/09/2020 09:40 AM    ABS. NEUTROPHILS 2.7 06/09/2020 09:40 AM     Lab Results   Component Value Date/Time    Sodium 141 06/02/2020 08:59 AM    Potassium 3.7 06/02/2020 08:59 AM    Chloride 109 (H) 06/02/2020 08:59 AM    CO2 25 06/02/2020 08:59 AM    Glucose 193 (H) 06/02/2020 08:59 AM    BUN 14 06/02/2020 08:59 AM    Creatinine 1.03 (H) 06/02/2020 08:59 AM    GFR est AA >60 06/02/2020 08:59 AM    GFR est non-AA 53 (L) 06/02/2020 08:59 AM    Calcium 8.8 06/02/2020 08:59 AM     Lab Results   Component Value Date/Time    Bilirubin, total 0.4 06/02/2020 08:59 AM    ALT (SGPT) 38 06/02/2020 08:59 AM    Alk.  phosphatase 89 06/02/2020 08:59 AM    Protein, total 6.6 06/02/2020 08:59 AM    Albumin 3.2 (L) 06/02/2020 08:59 AM    Globulin 3.4 06/02/2020 08:59 AM       2/11/20 MRI breast  Left:  11 mm mass at 3:00  Right:  9:00, 5 mm mass  No LAD    4/20/20 TTE EF 63%    Records reviewed and summarized above. Pathology report(s) reviewed above. Radiology report(s) reviewed above. Assessment/plan:   1. Left breast IDC, 1.1 cm, 0/2 LN, ER +, [place] negative, HER 2 POSITIVE, gr 3:  Stage IA (both anatomic and prognostic)    We explained to the patient that the goal of systemic adjuvant therapy is to improve the chances for cure and decrease the risk of relapse. We explained why a patient can have microscopic cancer spread now even though physical examination, laboratory studies and imaging studies are negative for cancer. We explained that the same treatments used now as adjuvant or preventive treatments rarely if ever are curative in women who develop metastases. Using eprognosis. org, her 5 year all cause mortality risk is only 6-8%; her 10 year all cause mortality risk is 15-23%; her median OS is 17-21 years. An adjuvant conversation is warranted. Discussed weekly paclitaxel 80 mg/m2 with weekly trastuzumab 4 mg/kg load then 2 mg/kg weekly x 12 followed by completion of a year of trastuzumab 6 mg/kg q 3 weeks. This is APT study. I discussed the potential risks of paclitaxel chemotherapy with the patient. Major toxicities include nausea and vomiting, stomatitis, fatigue. Anemia frequently results. Neutropenic fever is uncommon, but can be a life-threatening problem. We provided the patient with detailed information concerning toxicity including frequent toxicities that only last a few days, such as nausea, vomiting, mouth sores, arthralgia, myalgia, and potentially allergic reactions to paclitaxel, as well as toxicities which can be longer lasting including fatigue, anemia and neuropathy. We provided the patient with detailed information concerning the toxicities of their regimen in addition to our verbal discussion.     Rationale for therapy with trastuzumab was also discussed with the patient including a 50% proportional improvement in disease free survival and also an improvement in overall survival in patients receiving trastuzumab and chemotherapy for HER-2 positive breast cancer. The side effects of trastuzumab were discussed including a 4%-5% risk of dropping her ejection fraction while on treatment and about a 1% risk of CHF. We discussed that this drug will be used every 3 weeks for remainder of a year following the chemotherapy cycles. We will check her EF before chemotherapy and every 3 months while she is receiving trastuzumab. After this discussion, she is agreeable to paclitaxel and trastuzumab as above. She has signed informed consent. TTE on 4/20/2020, EF 63%    Paclitaxel/trastuzumab #8 today. Discussed oral and peripheral cryotherapy. She is not interested in cold caps    Dr. Alberta Whitney has placed port    rx for zofran, compazine, emla cream    Following chemotherapy, she is an excellent candidate for radiation and endocrine therapy    2. Right breast ILC, 7 mm, ER +, LA +, HER 2 negative, gr 1, 0/8 LN, stage IA both anatomic and prognostic    We explained to the patient that the goal of systemic adjuvant therapy is to improve the chances for cure and decrease the risk of relapse. We explained why a patient can have microscopic cancer spread now even though physical examination, laboratory studies and imaging studies are negative for cancer. We explained that the same treatments used now as adjuvant or preventive treatments rarely if ever are curative in women who develop metastases. Recommend endocrine therapy, see #1 above. While she does not quite meet the criteria on this side for CALGB 9343 (76 yo instead of 70), she does meet KIT guidelines for possibly avoiding XRT on this side, though would defer to Dr. Demetrio Granado since XRT is indicated on the Left due to HER2 + disease. 3. Emotional well being:  She has excellent support and is coping well with her disease    4. BRCA2 and RAD50 VUS:  Not clinically significant at this time    5. Neck itching:  resolved    6. Obesity:  Will monitor following chemo    7. Increased cr: Will monitor. Will check CMP today. 8. Neuropathy: To fingers/feet. 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 Consuelo sawyer. Signed By: Roslyn Sherwood MD      No orders of the defined types were placed in this encounter.

## 2020-06-09 NOTE — PROGRESS NOTES
\[facility]\"" Progress Note    Date: 2020    Name: Lucy Lane    MRN: [id number]         : 1950    Ms. Severa Landry Arrived ambulatory and in no distress for C3D8 of Taxol/Herceptin Regimen. Assessment was completed, no acute issues at this time, no new complaints voiced. Right chest wall port accessed without difficulty, labs drawn & sent for processing. Chemotherapy Flowsheet 2020   Cycle C3D8   Date 2020   Drug / Regimen Taxol/Herceptin   Pre Meds given   Notes given       Patient proceed to appointment with Dr. Esthela Barboza. MsCan Deannie Spatz vitals were reviewed. Visit Vitals  /60 (BP 1 Location: Left arm, BP Patient Position: At rest)   Pulse 70   Temp 97.1 °F (36.2 °C)   Resp 18   Ht 5' 7\" (1.702 m)   Wt 107.7 kg (237 lb 8 oz)   SpO2 96%   BMI 37.20 kg/m²       Lab results were obtained and reviewed. Recent Results (from the past 8 hour(s))   CBC WITH 3 PART DIFF    Collection Time: 20  9:40 AM   Result Value Ref Range    WBC 5.0 3.6 - 11.0 K/uL    RBC 3.73 (L) 3.80 - 5.20 M/uL    HGB 11.3 (L) 11.5 - 16.0 g/dL    HCT 33.3 (L) 35.0 - 47.0 %    MCV 89.3 80.0 - 99.0 FL    MCH 30.3 26.0 - 34.0 PG    MCHC 33.9 30.0 - 36.5 g/dL    RDW 16.7 (H) 11.8 - 15.8 %    PLATELET 535 252 - 863 K/uL    NEUTROPHILS 56 32 - 75 %    MIXED CELLS 6 3.2 - 16.9 %    LYMPHOCYTES 39 12 - 49 %    ABS. NEUTROPHILS 2.7 1.8 - 8.0 K/UL    ABS. MIXED CELLS 0.3 0.2 - 1.2 K/uL    ABS.  LYMPHOCYTES 2.0 0.8 - 3.5 K/UL    DF AUTOMATED     METABOLIC PANEL, COMPREHENSIVE    Collection Time: 20 11:45 AM   Result Value Ref Range    Sodium 138 136 - 145 mmol/L    Potassium 3.7 3.5 - 5.1 mmol/L    Chloride 108 97 - 108 mmol/L    CO2 27 21 - 32 mmol/L    Anion gap 3 (L) 5 - 15 mmol/L    Glucose 128 (H) 65 - 100 mg/dL    BUN 13 6 - 20 MG/DL    Creatinine 0.97 0.55 - 1.02 MG/DL    BUN/Creatinine ratio 13 12 - 20      GFR est AA >60 >60 ml/min/1.73m2    GFR est non-AA 57 (L) >60 ml/min/1.73m2    Calcium 8.6 8.5 - 10.1 MG/DL Bilirubin, total 0.3 0.2 - 1.0 MG/DL    ALT (SGPT) 30 12 - 78 U/L    AST (SGOT) 19 15 - 37 U/L    Alk. phosphatase 83 45 - 117 U/L    Protein, total 6.8 6.4 - 8.2 g/dL    Albumin 3.2 (L) 3.5 - 5.0 g/dL    Globulin 3.6 2.0 - 4.0 g/dL    A-G Ratio 0.9 (L) 1.1 - 2.2       Labs within parameters to treat.     Medications:  Medications Administered     0.9% sodium chloride infusion     Admin Date  06/09/2020 Action  New Bag Dose  25 mL/hr Rate  25 mL/hr Route  IntraVENous Administered By  Lee Dumont, RN          dexamethasone (DECADRON) 4 mg/mL injection 10 mg     Admin Date  06/09/2020 Action  Given Dose  10 mg Route  IntraVENous Administered By  Lee Dumont, RN          diphenhydrAMINE (BENADRYL) injection 50 mg     Admin Date  06/09/2020 Action  Given Dose  50 mg Route  IntraVENous Administered By  Lee Dumont, RN          famotidine (PF) (PEPCID) 20 mg in 0.9% sodium chloride 10 mL injection     Admin Date  06/09/2020 Action  Given Dose  20 mg Route  IntraVENous Administered By  Lee Dumont, RN          heparin (porcine) pf 300-500 Units     Admin Date  06/09/2020 Action  Given Dose  500 Units Route  InterCATHeter Administered By  Lee Dumont, RN          PACLitaxeL (TAXOL) 178 mg in 0.9% sodium chloride 250 mL, overfill volume 25 mL chemo infusion     Admin Date  06/09/2020 Action  New Bag Dose  178 mg Rate  304.7 mL/hr Route  IntraVENous Administered By  Lee Dumont, RN          saline peripheral flush soln 10 mL     Admin Date  06/09/2020 Action  Given Dose  10 mL Route  InterCATHeter Administered By  Lee Dumont, SHELLI          trastuzumab (HERCEPTIN) 210 mg in 0.9% sodium chloride 250 mL, overfill volume 25 mL IVPB     Admin Date  06/09/2020 Action  New Bag Dose  210 mg Rate  570 mL/hr Route  IntraVENous Administered By  Lee Dumont, RN              Ms. Pastora Christianson tolerated treatment well and was discharged from [place] [address] in stable condition. Port de-accessed, flushed & heparinized per protocol. She is to return on June 16 2020 for her next appointment.     Jorge Higuera RN  June 9, 2020

## 2020-06-09 NOTE — PROGRESS NOTES
Bubba Potter is a 71 y.o. female Follow up for the Evaluation of Breast Cancer. 1. Have you been to the ER, urgent care clinic since your last visit? Hospitalized since your last visit? No    2. Have you seen or consulted any other health care providers outside of the [address] [place] [address] since your last visit? Include any pap smears or colon screening.  No

## 2020-06-16 ENCOUNTER — HOSPITAL ENCOUNTER (OUTPATIENT)
Dept: INFUSION THERAPY | Age: 70
Discharge: HOME | End: 2020-06-16
Payer: MEDICARE

## 2020-06-16 VITALS
HEART RATE: 94 BPM | WEIGHT: 231.7 LBS | HEIGHT: 67 IN | OXYGEN SATURATION: 99 % | DIASTOLIC BLOOD PRESSURE: 62 MMHG | RESPIRATION RATE: 18 BRPM | TEMPERATURE: 98.2 F | BODY MASS INDEX: 36.37 KG/M2 | SYSTOLIC BLOOD PRESSURE: 124 MMHG

## 2020-06-16 DIAGNOSIS — C50.912 BILATERAL MALIGNANT NEOPLASM OF BREAST IN FEMALE, ESTROGEN RECEPTOR POSITIVE, UNSPECIFIED SITE OF BREAST (HCC): Primary | ICD-10-CM

## 2020-06-16 DIAGNOSIS — Z17.0 BILATERAL MALIGNANT NEOPLASM OF BREAST IN FEMALE, ESTROGEN RECEPTOR POSITIVE, UNSPECIFIED SITE OF BREAST (HCC): Primary | ICD-10-CM

## 2020-06-16 DIAGNOSIS — C50.911 BILATERAL MALIGNANT NEOPLASM OF BREAST IN FEMALE, ESTROGEN RECEPTOR POSITIVE, UNSPECIFIED SITE OF BREAST (HCC): Primary | ICD-10-CM

## 2020-06-16 LAB
BASO+EOS+MONOS # BLD AUTO: 0.4 K/UL (ref 0.2–1.2)
BASO+EOS+MONOS NFR BLD AUTO: 9 % (ref 3.2–16.9)
DIFFERENTIAL METHOD BLD: ABNORMAL
ERYTHROCYTE [DISTWIDTH] IN BLOOD BY AUTOMATED COUNT: 16.6 % (ref 11.8–15.8)
HCT VFR BLD AUTO: 36.3 % (ref 35–47)
HGB BLD-MCNC: 12.5 G/DL (ref 11.5–16)
LYMPHOCYTES # BLD: 1.8 K/UL (ref 0.8–3.5)
LYMPHOCYTES NFR BLD: 38 % (ref 12–49)
MCH RBC QN AUTO: 30.7 PG (ref 26–34)
MCHC RBC AUTO-ENTMCNC: 34.4 G/DL (ref 30–36.5)
MCV RBC AUTO: 89.2 FL (ref 80–99)
NEUTS SEG # BLD: 2.6 K/UL (ref 1.8–8)
NEUTS SEG NFR BLD: 53 % (ref 32–75)
PLATELET # BLD AUTO: 359 K/UL (ref 150–400)
RBC # BLD AUTO: 4.07 M/UL (ref 3.8–5.2)
WBC # BLD AUTO: 4.8 K/UL (ref 3.6–11)

## 2020-06-16 PROCEDURE — 77030016057 HC NDL HUBR APOL -B

## 2020-06-16 PROCEDURE — 85025 COMPLETE CBC W/AUTO DIFF WBC: CPT

## 2020-06-16 PROCEDURE — 36415 COLL VENOUS BLD VENIPUNCTURE: CPT

## 2020-06-16 PROCEDURE — 74011250636 HC RX REV CODE- 250/636: Performed by: INTERNAL MEDICINE

## 2020-06-16 PROCEDURE — 96375 TX/PRO/DX INJ NEW DRUG ADDON: CPT

## 2020-06-16 PROCEDURE — 96417 CHEMO IV INFUS EACH ADDL SEQ: CPT

## 2020-06-16 PROCEDURE — 96413 CHEMO IV INFUSION 1 HR: CPT

## 2020-06-16 RX ORDER — SODIUM CHLORIDE 9 MG/ML
10 INJECTION INTRAMUSCULAR; INTRAVENOUS; SUBCUTANEOUS AS NEEDED
Status: ACTIVE | OUTPATIENT
Start: 2020-06-16 | End: 2020-06-16

## 2020-06-16 RX ORDER — DIPHENHYDRAMINE HYDROCHLORIDE 50 MG/ML
50 INJECTION, SOLUTION INTRAMUSCULAR; INTRAVENOUS ONCE
Status: COMPLETED | OUTPATIENT
Start: 2020-06-16 | End: 2020-06-16

## 2020-06-16 RX ORDER — SODIUM CHLORIDE 9 MG/ML
25 INJECTION, SOLUTION INTRAVENOUS CONTINUOUS
Status: DISPENSED | OUTPATIENT
Start: 2020-06-16 | End: 2020-06-16

## 2020-06-16 RX ORDER — DEXAMETHASONE SODIUM PHOSPHATE 100 MG/10ML
10 INJECTION INTRAMUSCULAR; INTRAVENOUS ONCE
Status: COMPLETED | OUTPATIENT
Start: 2020-06-16 | End: 2020-06-16

## 2020-06-16 RX ORDER — SODIUM CHLORIDE 0.9 % (FLUSH) 0.9 %
10 SYRINGE (ML) INJECTION AS NEEDED
Status: DISPENSED | OUTPATIENT
Start: 2020-06-16 | End: 2020-06-16

## 2020-06-16 RX ORDER — HEPARIN 100 UNIT/ML
300-500 SYRINGE INTRAVENOUS AS NEEDED
Status: ACTIVE | OUTPATIENT
Start: 2020-06-16 | End: 2020-06-16

## 2020-06-16 RX ADMIN — PACLITAXEL 178 MG: 6 INJECTION, SOLUTION INTRAVENOUS at 10:26

## 2020-06-16 RX ADMIN — SODIUM CHLORIDE 25 ML/HR: 900 INJECTION, SOLUTION INTRAVENOUS at 09:18

## 2020-06-16 RX ADMIN — DEXAMETHASONE SODIUM PHOSPHATE 10 MG: 10 INJECTION INTRAMUSCULAR; INTRAVENOUS at 09:22

## 2020-06-16 RX ADMIN — FAMOTIDINE 20 MG: 10 INJECTION INTRAVENOUS at 09:20

## 2020-06-16 RX ADMIN — TRASTUZUMAB 210 MG: 150 INJECTION, POWDER, LYOPHILIZED, FOR SOLUTION INTRAVENOUS at 09:40

## 2020-06-16 RX ADMIN — DIPHENHYDRAMINE HYDROCHLORIDE 50 MG: 50 INJECTION INTRAMUSCULAR; INTRAVENOUS at 09:18

## 2020-06-16 NOTE — PROGRESS NOTES
[facility] Progress Note    Date: 2020    Name: Gael Warner    MRN: [id number]         : 1950    Ms. Elisa Zuluaga Arrived ambulatory and in no distress for C3D15 of Taxol/Herceptin Regimen. Assessment was completed, patient complaining of ongoing neuropathy in fingertips and toes. Right chest wall port accessed without difficulty, labs drawn & sent for processing. Chemotherapy Flowsheet 2020   Cycle C3D15   Date 2020   Drug / Regimen Taxol/Herceptin   Pre Meds given   Notes given       Ms. Barrios's vitals were reviewed. Visit Vitals  /61 (BP 1 Location: Right arm, BP Patient Position: At rest)   Pulse 94   Temp 98.2 °F (36.8 °C)   Resp 18   Ht 5' 7\" (1.702 m)   Wt 105.1 kg (231 lb 11.2 oz)   SpO2 99%   BMI 36.29 kg/m²       Lab results were obtained and reviewed. Recent Results (from the past 12 hour(s))   CBC WITH 3 PART DIFF    Collection Time: 20  8:56 AM   Result Value Ref Range    WBC 4.8 3.6 - 11.0 K/uL    RBC 4.07 3.80 - 5.20 M/uL    HGB 12.5 11.5 - 16.0 g/dL    HCT 36.3 35.0 - 47.0 %    MCV 89.2 80.0 - 99.0 FL    MCH 30.7 26.0 - 34.0 PG    MCHC 34.4 30.0 - 36.5 g/dL    RDW 16.6 (H) 11.8 - 15.8 %    PLATELET 999 159 - 675 K/uL    NEUTROPHILS 53 32 - 75 %    MIXED CELLS 9 3.2 - 16.9 %    LYMPHOCYTES 38 12 - 49 %    ABS. NEUTROPHILS 2.6 1.8 - 8.0 K/UL    ABS. MIXED CELLS 0.4 0.2 - 1.2 K/uL    ABS. LYMPHOCYTES 1.8 0.8 - 3.5 K/UL    DF AUTOMATED       Labs within parameters to treat.     Medications:  Medications Administered     0.9% sodium chloride infusion     Admin Date  2020 Action  New Bag Dose  25 mL/hr Rate  25 mL/hr Route  IntraVENous Administered By  Zaina Reardon, RN          dexamethasone (DECADRON) 10 mg/mL injection 10 mg     Admin Date  2020 Action  Given Dose  10 mg Route  IntraVENous Administered By  Zaina Reardon, SHELLI          diphenhydrAMINE (BENADRYL) injection 50 mg     Admin Date  2020 Action  Given Dose  50 mg Route  IntraVENous Administered By  Caro Miranda RN          famotidine (PF) (PEPCID) 20 mg in 0.9% sodium chloride 10 mL injection     Admin Date  06/16/2020 Action  Given Dose  20 mg Route  IntraVENous Administered By  Caro Miranda RN          PACLitaxeL (TAXOL) 178 mg in 0.9% sodium chloride 250 mL, overfill volume 25 mL chemo infusion     Admin Date  06/16/2020 Action  New Bag Dose  178 mg Rate  304.7 mL/hr Route  IntraVENous Administered By  Caro Miranda RN          trastuzumab (HERCEPTIN) 210 mg in 0.9% sodium chloride 250 mL, overfill volume 25 mL IVPB     Admin Date  06/16/2020 Action  New Bag Dose  210 mg Rate  570 mL/hr Route  IntraVENous Administered By  Caro Miranda RN                Ms. Kyler López tolerated treatment well and was discharged from [place] [address] in stable condition. Port de-accessed, flushed & heparinized per protocol. She is to return on June 23 2020 for her next appointment.     Berry Guzman RN  June 16, 2020

## 2020-06-23 ENCOUNTER — HOSPITAL ENCOUNTER (OUTPATIENT)
Dept: INFUSION THERAPY | Age: 70
Discharge: HOME | End: 2020-06-23
Payer: MEDICARE

## 2020-06-23 VITALS
WEIGHT: 233.4 LBS | SYSTOLIC BLOOD PRESSURE: 132 MMHG | DIASTOLIC BLOOD PRESSURE: 64 MMHG | TEMPERATURE: 99 F | RESPIRATION RATE: 18 BRPM | HEIGHT: 67 IN | HEART RATE: 89 BPM | OXYGEN SATURATION: 99 % | BODY MASS INDEX: 36.63 KG/M2

## 2020-06-23 DIAGNOSIS — Z17.0 BILATERAL MALIGNANT NEOPLASM OF BREAST IN FEMALE, ESTROGEN RECEPTOR POSITIVE, UNSPECIFIED SITE OF BREAST (HCC): Primary | ICD-10-CM

## 2020-06-23 DIAGNOSIS — C50.911 BILATERAL MALIGNANT NEOPLASM OF BREAST IN FEMALE, ESTROGEN RECEPTOR POSITIVE, UNSPECIFIED SITE OF BREAST (HCC): Primary | ICD-10-CM

## 2020-06-23 DIAGNOSIS — C50.912 BILATERAL MALIGNANT NEOPLASM OF BREAST IN FEMALE, ESTROGEN RECEPTOR POSITIVE, UNSPECIFIED SITE OF BREAST (HCC): Primary | ICD-10-CM

## 2020-06-23 LAB
ALBUMIN SERPL-MCNC: 3.3 G/DL (ref 3.5–5)
ALBUMIN/GLOB SERPL: 0.9 {RATIO} (ref 1.1–2.2)
ALP SERPL-CCNC: 90 U/L (ref 45–117)
ALT SERPL-CCNC: 32 U/L (ref 12–78)
ANION GAP SERPL CALC-SCNC: 5 MMOL/L (ref 5–15)
AST SERPL-CCNC: 19 U/L (ref 15–37)
BASO+EOS+MONOS # BLD AUTO: 0.3 K/UL (ref 0.2–1.2)
BASO+EOS+MONOS NFR BLD AUTO: 6 % (ref 3.2–16.9)
BILIRUB SERPL-MCNC: 0.6 MG/DL (ref 0.2–1)
BUN SERPL-MCNC: 13 MG/DL (ref 6–20)
BUN/CREAT SERPL: 12 (ref 12–20)
CALCIUM SERPL-MCNC: 8.8 MG/DL (ref 8.5–10.1)
CHLORIDE SERPL-SCNC: 106 MMOL/L (ref 97–108)
CO2 SERPL-SCNC: 25 MMOL/L (ref 21–32)
CREAT SERPL-MCNC: 1.08 MG/DL (ref 0.55–1.02)
DIFFERENTIAL METHOD BLD: ABNORMAL
ERYTHROCYTE [DISTWIDTH] IN BLOOD BY AUTOMATED COUNT: 16.6 % (ref 11.8–15.8)
GLOBULIN SER CALC-MCNC: 3.6 G/DL (ref 2–4)
GLUCOSE SERPL-MCNC: 188 MG/DL (ref 65–100)
HCT VFR BLD AUTO: 33.4 % (ref 35–47)
HGB BLD-MCNC: 11.7 G/DL (ref 11.5–16)
LYMPHOCYTES # BLD: 2 K/UL (ref 0.8–3.5)
LYMPHOCYTES NFR BLD: 44 % (ref 12–49)
MCH RBC QN AUTO: 31.2 PG (ref 26–34)
MCHC RBC AUTO-ENTMCNC: 35 G/DL (ref 30–36.5)
MCV RBC AUTO: 89.1 FL (ref 80–99)
NEUTS SEG # BLD: 2.3 K/UL (ref 1.8–8)
NEUTS SEG NFR BLD: 50 % (ref 32–75)
PLATELET # BLD AUTO: 350 K/UL (ref 150–400)
POTASSIUM SERPL-SCNC: 3.8 MMOL/L (ref 3.5–5.1)
PROT SERPL-MCNC: 6.9 G/DL (ref 6.4–8.2)
RBC # BLD AUTO: 3.75 M/UL (ref 3.8–5.2)
SODIUM SERPL-SCNC: 136 MMOL/L (ref 136–145)
WBC # BLD AUTO: 4.6 K/UL (ref 3.6–11)

## 2020-06-23 PROCEDURE — 74011250636 HC RX REV CODE- 250/636: Performed by: INTERNAL MEDICINE

## 2020-06-23 PROCEDURE — 74011000250 HC RX REV CODE- 250: Performed by: INTERNAL MEDICINE

## 2020-06-23 PROCEDURE — 96417 CHEMO IV INFUS EACH ADDL SEQ: CPT

## 2020-06-23 PROCEDURE — 96413 CHEMO IV INFUSION 1 HR: CPT

## 2020-06-23 PROCEDURE — 80053 COMPREHEN METABOLIC PANEL: CPT

## 2020-06-23 PROCEDURE — 77030012965 HC NDL HUBR BBMI -A

## 2020-06-23 PROCEDURE — 36415 COLL VENOUS BLD VENIPUNCTURE: CPT

## 2020-06-23 PROCEDURE — 96375 TX/PRO/DX INJ NEW DRUG ADDON: CPT

## 2020-06-23 PROCEDURE — 85025 COMPLETE CBC W/AUTO DIFF WBC: CPT

## 2020-06-23 RX ORDER — DIPHENHYDRAMINE HYDROCHLORIDE 50 MG/ML
50 INJECTION, SOLUTION INTRAMUSCULAR; INTRAVENOUS ONCE
Status: COMPLETED | OUTPATIENT
Start: 2020-06-23 | End: 2020-06-23

## 2020-06-23 RX ORDER — SODIUM CHLORIDE 0.9 % (FLUSH) 0.9 %
10 SYRINGE (ML) INJECTION AS NEEDED
Status: DISPENSED | OUTPATIENT
Start: 2020-06-23 | End: 2020-06-23

## 2020-06-23 RX ORDER — SODIUM CHLORIDE 9 MG/ML
25 INJECTION, SOLUTION INTRAVENOUS CONTINUOUS
Status: DISPENSED | OUTPATIENT
Start: 2020-06-23 | End: 2020-06-23

## 2020-06-23 RX ORDER — DEXAMETHASONE SODIUM PHOSPHATE 100 MG/10ML
10 INJECTION INTRAMUSCULAR; INTRAVENOUS ONCE
Status: COMPLETED | OUTPATIENT
Start: 2020-06-23 | End: 2020-06-23

## 2020-06-23 RX ORDER — SODIUM CHLORIDE 9 MG/ML
10 INJECTION INTRAMUSCULAR; INTRAVENOUS; SUBCUTANEOUS AS NEEDED
Status: ACTIVE | OUTPATIENT
Start: 2020-06-23 | End: 2020-06-23

## 2020-06-23 RX ORDER — HEPARIN 100 UNIT/ML
300-500 SYRINGE INTRAVENOUS AS NEEDED
Status: ACTIVE | OUTPATIENT
Start: 2020-06-23 | End: 2020-06-23

## 2020-06-23 RX ADMIN — DIPHENHYDRAMINE HYDROCHLORIDE 50 MG: 50 INJECTION INTRAMUSCULAR; INTRAVENOUS at 10:44

## 2020-06-23 RX ADMIN — DEXAMETHASONE SODIUM PHOSPHATE 10 MG: 10 INJECTION INTRAMUSCULAR; INTRAVENOUS at 10:46

## 2020-06-23 RX ADMIN — SODIUM CHLORIDE 25 ML/HR: 900 INJECTION, SOLUTION INTRAVENOUS at 10:38

## 2020-06-23 RX ADMIN — TRASTUZUMAB 210 MG: 150 INJECTION, POWDER, LYOPHILIZED, FOR SOLUTION INTRAVENOUS at 10:54

## 2020-06-23 RX ADMIN — Medication 10 ML: at 12:39

## 2020-06-23 RX ADMIN — SODIUM CHLORIDE 10 ML: 9 INJECTION, SOLUTION INTRAMUSCULAR; INTRAVENOUS; SUBCUTANEOUS at 09:45

## 2020-06-23 RX ADMIN — FAMOTIDINE 20 MG: 10 INJECTION, SOLUTION INTRAVENOUS at 10:40

## 2020-06-23 RX ADMIN — PACLITAXEL 178 MG: 300 INJECTION INTRAVENOUS at 11:36

## 2020-06-23 RX ADMIN — Medication 500 UNITS: at 12:38

## 2020-06-23 NOTE — PROGRESS NOTES
[facility] VISIT NOTE  Date: 2020    Name: Mago Berman    MRN: [id number]         : 1950    0930  Ms. Paula Leal Arrived ambulatory and in no distress for C4D1 of Taxol and Herceptin Regimen. Assessment was completed, no acute issues at this time, no new complaints voiced. Left chest wall port accessed without difficulty, labs drawn & sent for processing. Chemotherapy Flowsheet 2020   Cycle C4D1   Date 2020   Drug / Regimen Taxol/Herceptin   Pre Meds given   Notes given       Vitals:  Patient Vitals for the past 12 hrs:   Temp Pulse Resp BP SpO2   20 1236 -- 89 -- 132/64 --   20 0932 99 °F (37.2 °C) 94 18 140/64 99 %        Lab results were obtained and reviewed. Recent Results (from the past 12 hour(s))   CBC WITH 3 PART DIFF    Collection Time: 20  9:48 AM   Result Value Ref Range    WBC 4.6 3.6 - 11.0 K/uL    RBC 3.75 (L) 3.80 - 5.20 M/uL    HGB 11.7 11.5 - 16.0 g/dL    HCT 33.4 (L) 35.0 - 47.0 %    MCV 89.1 80.0 - 99.0 FL    MCH 31.2 26.0 - 34.0 PG    MCHC 35.0 30.0 - 36.5 g/dL    RDW 16.6 (H) 11.8 - 15.8 %    PLATELET 070 665 - 014 K/uL    NEUTROPHILS 50 32 - 75 %    MIXED CELLS 6 3.2 - 16.9 %    LYMPHOCYTES 44 12 - 49 %    ABS. NEUTROPHILS 2.3 1.8 - 8.0 K/UL    ABS. MIXED CELLS 0.3 0.2 - 1.2 K/uL    ABS. LYMPHOCYTES 2.0 0.8 - 3.5 K/UL    DF AUTOMATED     METABOLIC PANEL, COMPREHENSIVE    Collection Time: 20  9:48 AM   Result Value Ref Range    Sodium 136 136 - 145 mmol/L    Potassium 3.8 3.5 - 5.1 mmol/L    Chloride 106 97 - 108 mmol/L    CO2 25 21 - 32 mmol/L    Anion gap 5 5 - 15 mmol/L    Glucose 188 (H) 65 - 100 mg/dL    BUN 13 6 - 20 MG/DL    Creatinine 1.08 (H) 0.55 - 1.02 MG/DL    BUN/Creatinine ratio 12 12 - 20      GFR est AA >60 >60 ml/min/1.73m2    GFR est non-AA 50 (L) >60 ml/min/1.73m2    Calcium 8.8 8.5 - 10.1 MG/DL    Bilirubin, total 0.6 0.2 - 1.0 MG/DL    ALT (SGPT) 32 12 - 78 U/L    AST (SGOT) 19 15 - 37 U/L    Alk. phosphatase 90 45 - 117 U/L    Protein, total 6.9 6.4 - 8.2 g/dL    Albumin 3.3 (L) 3.5 - 5.0 g/dL    Globulin 3.6 2.0 - 4.0 g/dL    A-G Ratio 0.9 (L) 1.1 - 2.2         Medications received:  Medications Administered     0.9% sodium chloride infusion     Admin Date  06/23/2020 Action  New Bag Dose  25 mL/hr Rate  25 mL/hr Route  IntraVENous Administered By  Ezio Lee, SHELLI          0.9% sodium chloride injection 10 mL     Admin Date  06/23/2020 Action  Given Dose  10 mL Route  IntraVENous Administered By  Ezio Lee, SHELLI          dexamethasone (DECADRON) 10 mg/mL injection 10 mg     Admin Date  06/23/2020 Action  Given Dose  10 mg Route  IntraVENous Administered By  Ezio Lee, SHELLI          diphenhydrAMINE (BENADRYL) injection 50 mg     Admin Date  06/23/2020 Action  Given Dose  50 mg Route  IntraVENous Administered By  Ezio Lee, SHELLI          famotidine (PF) (PEPCID) 20 mg in 0.9% sodium chloride 10 mL injection     Admin Date  06/23/2020 Action  Given Dose  20 mg Route  IntraVENous Administered By  Ezio Lee RN          heparin (porcine) pf 300-500 Units     Admin Date  06/23/2020 Action  Given Dose  500 Units Route  InterCATHeter Administered By  Ezio Lee RN          PACLitaxeL (TAXOL) 178 mg in 0.9% sodium chloride 250 mL, overfill volume 25 mL chemo infusion     Admin Date  06/23/2020 Action  New Bag Dose  178 mg Rate  304.7 mL/hr Route  IntraVENous Administered By  Reid Rios RN          saline peripheral flush soln 10 mL     Admin Date  06/23/2020 Action  Given Dose  10 mL Route  InterCATHeter Administered By  Ezio Lee RN          trastuzumab (HERCEPTIN) 210 mg in 0.9% sodium chloride 250 mL, overfill volume 25 mL IVPB     Admin Date  06/23/2020 Action  New Bag Dose  210 mg Rate  570 mL/hr Route  IntraVENous Administered By  Ezio Lee RN                Ms. Peggy Gomez tolerated treatment well and was discharged from [place] [address] in stable condition at 1240. Port de-accessed, flushed & heparinized per protocol. She is to return on 6/30/2020 at 1000 for her next appointment.     Hector Quezada RN  June 23, 2020    Future Appointments:  Future Appointments   Date Time Provider Samantha Bhat   6/30/2020 10:00 AM SS INF5 CH3 <4H [facility] Dum[facility]   6/30/2020 10:30 AM Osmel Estevez MD [address] [place] [address], [facility]   7/7/2020 10:00 AM SS INF7 CH4 <4H [facility]   7/14/2020  9:00 AM SS INF2 CH3 <4H [facility]

## 2020-06-30 ENCOUNTER — OFFICE VISIT (OUTPATIENT)
Dept: ONCOLOGY | Age: 70
End: 2020-06-30

## 2020-06-30 ENCOUNTER — HOSPITAL ENCOUNTER (OUTPATIENT)
Dept: INFUSION THERAPY | Age: 70
Discharge: HOME | End: 2020-06-30
Payer: MEDICARE

## 2020-06-30 VITALS
HEART RATE: 88 BPM | BODY MASS INDEX: 36.54 KG/M2 | TEMPERATURE: 98.4 F | DIASTOLIC BLOOD PRESSURE: 61 MMHG | SYSTOLIC BLOOD PRESSURE: 116 MMHG | OXYGEN SATURATION: 97 % | RESPIRATION RATE: 18 BRPM | WEIGHT: 232.8 LBS | HEIGHT: 67 IN

## 2020-06-30 VITALS
WEIGHT: 232 LBS | SYSTOLIC BLOOD PRESSURE: 149 MMHG | DIASTOLIC BLOOD PRESSURE: 63 MMHG | HEIGHT: 67 IN | RESPIRATION RATE: 18 BRPM | TEMPERATURE: 98.4 F | HEART RATE: 91 BPM | BODY MASS INDEX: 36.41 KG/M2 | OXYGEN SATURATION: 97 %

## 2020-06-30 DIAGNOSIS — C50.911 BILATERAL MALIGNANT NEOPLASM OF BREAST IN FEMALE, ESTROGEN RECEPTOR POSITIVE, UNSPECIFIED SITE OF BREAST (HCC): Primary | ICD-10-CM

## 2020-06-30 DIAGNOSIS — C50.912 BILATERAL MALIGNANT NEOPLASM OF BREAST IN FEMALE, ESTROGEN RECEPTOR POSITIVE, UNSPECIFIED SITE OF BREAST (HCC): Primary | ICD-10-CM

## 2020-06-30 DIAGNOSIS — Z51.11 CHEMOTHERAPY MANAGEMENT, ENCOUNTER FOR: ICD-10-CM

## 2020-06-30 DIAGNOSIS — R79.89 ELEVATED SERUM CREATININE: ICD-10-CM

## 2020-06-30 DIAGNOSIS — Z17.0 BILATERAL MALIGNANT NEOPLASM OF BREAST IN FEMALE, ESTROGEN RECEPTOR POSITIVE, UNSPECIFIED SITE OF BREAST (HCC): Primary | ICD-10-CM

## 2020-06-30 DIAGNOSIS — Z51.81 ENCOUNTER FOR MONITORING CARDIOTOXIC DRUG THERAPY: ICD-10-CM

## 2020-06-30 DIAGNOSIS — Z79.899 ENCOUNTER FOR MONITORING CARDIOTOXIC DRUG THERAPY: ICD-10-CM

## 2020-06-30 LAB
ANION GAP SERPL CALC-SCNC: 5 MMOL/L (ref 5–15)
BASO+EOS+MONOS # BLD AUTO: 0.5 K/UL (ref 0.2–1.2)
BASO+EOS+MONOS NFR BLD AUTO: 9 % (ref 3.2–16.9)
BUN SERPL-MCNC: 13 MG/DL (ref 6–20)
BUN/CREAT SERPL: 12 (ref 12–20)
CALCIUM SERPL-MCNC: 8.6 MG/DL (ref 8.5–10.1)
CHLORIDE SERPL-SCNC: 108 MMOL/L (ref 97–108)
CO2 SERPL-SCNC: 26 MMOL/L (ref 21–32)
CREAT SERPL-MCNC: 1.13 MG/DL (ref 0.55–1.02)
DIFFERENTIAL METHOD BLD: ABNORMAL
ERYTHROCYTE [DISTWIDTH] IN BLOOD BY AUTOMATED COUNT: 16.5 % (ref 11.8–15.8)
GLUCOSE SERPL-MCNC: 174 MG/DL (ref 65–100)
HCT VFR BLD AUTO: 32.7 % (ref 35–47)
HGB BLD-MCNC: 11.4 G/DL (ref 11.5–16)
LYMPHOCYTES # BLD: 2.2 K/UL (ref 0.8–3.5)
LYMPHOCYTES NFR BLD: 41 % (ref 12–49)
MCH RBC QN AUTO: 31.1 PG (ref 26–34)
MCHC RBC AUTO-ENTMCNC: 34.9 G/DL (ref 30–36.5)
MCV RBC AUTO: 89.3 FL (ref 80–99)
NEUTS SEG # BLD: 2.7 K/UL (ref 1.8–8)
NEUTS SEG NFR BLD: 50 % (ref 32–75)
PLATELET # BLD AUTO: 345 K/UL (ref 150–400)
POTASSIUM SERPL-SCNC: 3.7 MMOL/L (ref 3.5–5.1)
RBC # BLD AUTO: 3.66 M/UL (ref 3.8–5.2)
SODIUM SERPL-SCNC: 139 MMOL/L (ref 136–145)
WBC # BLD AUTO: 5.4 K/UL (ref 3.6–11)

## 2020-06-30 PROCEDURE — 96413 CHEMO IV INFUSION 1 HR: CPT

## 2020-06-30 PROCEDURE — 85025 COMPLETE CBC W/AUTO DIFF WBC: CPT

## 2020-06-30 PROCEDURE — 80048 BASIC METABOLIC PNL TOTAL CA: CPT

## 2020-06-30 PROCEDURE — 74011250636 HC RX REV CODE- 250/636: Performed by: INTERNAL MEDICINE

## 2020-06-30 PROCEDURE — 96417 CHEMO IV INFUS EACH ADDL SEQ: CPT

## 2020-06-30 PROCEDURE — 77030012965 HC NDL HUBR BBMI -A

## 2020-06-30 PROCEDURE — 96375 TX/PRO/DX INJ NEW DRUG ADDON: CPT

## 2020-06-30 PROCEDURE — 36415 COLL VENOUS BLD VENIPUNCTURE: CPT

## 2020-06-30 PROCEDURE — 74011250636 HC RX REV CODE- 250/636: Performed by: NURSE PRACTITIONER

## 2020-06-30 PROCEDURE — 74011000250 HC RX REV CODE- 250: Performed by: INTERNAL MEDICINE

## 2020-06-30 PROCEDURE — 96361 HYDRATE IV INFUSION ADD-ON: CPT

## 2020-06-30 RX ORDER — DEXAMETHASONE SODIUM PHOSPHATE 100 MG/10ML
10 INJECTION INTRAMUSCULAR; INTRAVENOUS ONCE
Status: COMPLETED | OUTPATIENT
Start: 2020-06-30 | End: 2020-06-30

## 2020-06-30 RX ORDER — SODIUM CHLORIDE 0.9 % (FLUSH) 0.9 %
10 SYRINGE (ML) INJECTION AS NEEDED
Status: DISPENSED | OUTPATIENT
Start: 2020-06-30 | End: 2020-06-30

## 2020-06-30 RX ORDER — DIPHENHYDRAMINE HYDROCHLORIDE 50 MG/ML
50 INJECTION, SOLUTION INTRAMUSCULAR; INTRAVENOUS ONCE
Status: COMPLETED | OUTPATIENT
Start: 2020-06-30 | End: 2020-06-30

## 2020-06-30 RX ORDER — SODIUM CHLORIDE 9 MG/ML
25 INJECTION, SOLUTION INTRAVENOUS CONTINUOUS
Status: DISPENSED | OUTPATIENT
Start: 2020-06-30 | End: 2020-06-30

## 2020-06-30 RX ORDER — HEPARIN 100 UNIT/ML
300-500 SYRINGE INTRAVENOUS AS NEEDED
Status: ACTIVE | OUTPATIENT
Start: 2020-06-30 | End: 2020-06-30

## 2020-06-30 RX ORDER — SODIUM CHLORIDE 9 MG/ML
10 INJECTION INTRAMUSCULAR; INTRAVENOUS; SUBCUTANEOUS AS NEEDED
Status: ACTIVE | OUTPATIENT
Start: 2020-06-30 | End: 2020-06-30

## 2020-06-30 RX ADMIN — FAMOTIDINE 20 MG: 10 INJECTION, SOLUTION INTRAVENOUS at 11:26

## 2020-06-30 RX ADMIN — SODIUM CHLORIDE 10 ML: 9 INJECTION, SOLUTION INTRAMUSCULAR; INTRAVENOUS; SUBCUTANEOUS at 10:06

## 2020-06-30 RX ADMIN — DEXAMETHASONE SODIUM PHOSPHATE 10 MG: 10 INJECTION INTRAMUSCULAR; INTRAVENOUS at 11:32

## 2020-06-30 RX ADMIN — DIPHENHYDRAMINE HYDROCHLORIDE 50 MG: 50 INJECTION INTRAMUSCULAR; INTRAVENOUS at 11:29

## 2020-06-30 RX ADMIN — SODIUM CHLORIDE 25 ML/HR: 900 INJECTION, SOLUTION INTRAVENOUS at 11:19

## 2020-06-30 RX ADMIN — SODIUM CHLORIDE 1000 ML: 900 INJECTION, SOLUTION INTRAVENOUS at 13:55

## 2020-06-30 RX ADMIN — Medication 10 ML: at 14:55

## 2020-06-30 RX ADMIN — PACLITAXEL 178 MG: 300 INJECTION INTRAVENOUS at 12:48

## 2020-06-30 RX ADMIN — TRASTUZUMAB 210 MG: 150 INJECTION, POWDER, LYOPHILIZED, FOR SOLUTION INTRAVENOUS at 12:09

## 2020-06-30 RX ADMIN — Medication 500 UNITS: at 14:55

## 2020-06-30 NOTE — PROGRESS NOTES
Nate Hoang is a 71 y.o. female Follow up for the Evaluation of Breast Cancer. 1. Have you been to the ER, urgent care clinic since your last visit? Hospitalized since your last visit? No    2. Have you seen or consulted any other health care providers outside of the [address] [place] [address] since your last visit? Include any pap smears or colon screening.  No

## 2020-06-30 NOTE — PROGRESS NOTES
[facility] [place] at [address], [address] [facility] [address] [facility]  Barrett Covarrubiashead: [phone]  F: [phone]      Reason for Visit:   Steff Flores is a 71 y.o. female who is seen in consultation at the request of Dr. Robel Da Silva for evaluation of therapy for breast cancer. Rad onc:  Dr. Meagan Fournier    Treatment History:   · 1/24/20 right breast mass:  9:00 core bx, IMC, gr 1, 5 mm, ER + at 99%, MS + at 97%, HER 2 negative at IHC 0, ki67 8%; left breast mass: 3:00 core bx, IMC, gr 3,  5 mm, ER + at 98%, MS negative, HER 2 POSITIVE at [facility] 3+  · invitae genetic testing:  BRCA2 and RAD50 VUS  · 3/23/20 right breast lumpectomy:  ILC, 7 mm, gr 1, no LVI, 0/8 LN, pT1b pN0 cMo; left breast lumpectomy:  IDC, 1.1 cm, gr 3, 0/2 LN, DCIS present, no LVI, pT1c pN0 cM0  · Paclitaxel/trastuzumab 4/21/20-    History of Present Illness: An abnormal mammogram led to the pathology above    Interval history:  In today for follow up and treatment. Complains of gr 1 neuropathy. FH:  No breast or ovarian cancer, no prostate or pancreas cancer    Past Medical History:   Diagnosis Date    Cancer [facility])       History reviewed. No pertinent surgical history. Social History     Tobacco Use    Smoking status: Never Smoker    Smokeless tobacco: Never Used   Substance Use Topics    Alcohol use: Not Currently      History reviewed. No pertinent family history. Current Outpatient Medications   Medication Sig    ergocalciferol (ERGOCALCIFEROL) 1,250 mcg (50,000 unit) capsule TK 1 C PO 1 TIME A WK    losartan (COZAAR) 50 mg tablet TK 1 T PO QD    omeprazole (PRILOSEC) 20 mg capsule TK 1 C PO QD    rosuvastatin (CRESTOR) 20 mg tablet TK 1 T PO QD    aspirin delayed-release 81 mg tablet Take  by mouth daily.  lidocaine-prilocaine (EMLA) topical cream Apply  to affected area as needed for Pain.  prochlorperazine (COMPAZINE) 10 mg tablet Take 1 Tab by mouth every six (6) hours as needed for Nausea.  ondansetron hcl (ZOFRAN) 8 mg tablet Take 1 Tab by mouth every eight (8) hours as needed for Nausea. No current facility-administered medications for this visit. Facility-Administered Medications Ordered in Other Visits   Medication Dose Route Frequency    saline peripheral flush soln 10 mL  10 mL InterCATHeter PRN    0.9% sodium chloride injection 10 mL  10 mL IntraVENous PRN    heparin (porcine) pf 300-500 Units  300-500 Units InterCATHeter PRN      No Known Allergies     Review of Systems: A complete review of systems was obtained, negative except as described above. Physical Exam:     Visit Vitals  /63 (BP 1 Location: Left arm, BP Patient Position: Sitting)   Pulse 91   Temp 98.4 °F (36.9 °C) (Temporal)   Resp 18   Ht 5' 7\" (1.702 m)   Wt 232 lb (105.2 kg)   SpO2 97%   BMI 36.34 kg/m²     General: No distress  Eyes: Anicteric sclerae  HENT: Atraumatic  Neck: Supple  Respiratory: Normal respiratory effort  CV: No peripheral edema  GI: nondistended  Skin: No rashes, ecchymoses, or petechiae  Psych: Alert, oriented, appropriate affect, normal judgment/insight      Results:     Lab Results   Component Value Date/Time    WBC 5.4 06/30/2020 10:05 AM    HGB 11.4 (L) 06/30/2020 10:05 AM    HCT 32.7 (L) 06/30/2020 10:05 AM    PLATELET 126 76/60/1183 10:05 AM    MCV 89.3 06/30/2020 10:05 AM    ABS. NEUTROPHILS 2.7 06/30/2020 10:05 AM     Lab Results   Component Value Date/Time    Sodium 136 06/23/2020 09:48 AM    Potassium 3.8 06/23/2020 09:48 AM    Chloride 106 06/23/2020 09:48 AM    CO2 25 06/23/2020 09:48 AM    Glucose 188 (H) 06/23/2020 09:48 AM    BUN 13 06/23/2020 09:48 AM    Creatinine 1.08 (H) 06/23/2020 09:48 AM    GFR est AA >60 06/23/2020 09:48 AM    GFR est non-AA 50 (L) 06/23/2020 09:48 AM    Calcium 8.8 06/23/2020 09:48 AM     Lab Results   Component Value Date/Time    Bilirubin, total 0.6 06/23/2020 09:48 AM    ALT (SGPT) 32 06/23/2020 09:48 AM    Alk.  phosphatase 90 06/23/2020 09:48 AM    Protein, total 6.9 06/23/2020 09:48 AM    Albumin 3.3 (L) 06/23/2020 09:48 AM    Globulin 3.6 06/23/2020 09:48 AM       2/11/20 MRI breast  Left:  11 mm mass at 3:00  Right:  9:00, 5 mm mass  No LAD    4/20/20 TTE EF 63%    Records reviewed and summarized above. Pathology report(s) reviewed above. Radiology report(s) reviewed above. Assessment/plan:   1. Left breast IDC, 1.1 cm, 0/2 LN, ER +, MT negative, HER 2 POSITIVE, gr 3:  Stage IA (both anatomic and prognostic)    We explained to the patient that the goal of systemic adjuvant therapy is to improve the chances for cure and decrease the risk of relapse. We explained why a patient can have microscopic cancer spread now even though physical examination, laboratory studies and imaging studies are negative for cancer. We explained that the same treatments used now as adjuvant or preventive treatments rarely if ever are curative in women who develop metastases. Using eprognosis. org, her 5 year all cause mortality risk is only 6-8%; her 10 year all cause mortality risk is 15-23%; her median OS is 17-21 years. An adjuvant conversation is warranted. Discussed weekly paclitaxel 80 mg/m2 with weekly trastuzumab 4 mg/kg load then 2 mg/kg weekly x 12 followed by completion of a year of trastuzumab 6 mg/kg q 3 weeks. This is APT study. I discussed the potential risks of paclitaxel chemotherapy with the patient. Major toxicities include nausea and vomiting, stomatitis, fatigue. Anemia frequently results. Neutropenic fever is uncommon, but can be a life-threatening problem. We provided the patient with detailed information concerning toxicity including frequent toxicities that only last a few days, such as nausea, vomiting, mouth sores, arthralgia, myalgia, and potentially allergic reactions to paclitaxel, as well as toxicities which can be longer lasting including fatigue, anemia and neuropathy.  We provided the patient with detailed information concerning the toxicities of their regimen in addition to our verbal discussion. Rationale for therapy with trastuzumab was also discussed with the patient including a 50% proportional improvement in disease free survival and also an improvement in overall survival in patients receiving trastuzumab and chemotherapy for HER-2 positive breast cancer. The side effects of trastuzumab were discussed including a 4%-5% risk of dropping her ejection fraction while on treatment and about a 1% risk of CHF. We discussed that this drug will be used every 3 weeks for remainder of a year following the chemotherapy cycles. We will check her EF before chemotherapy and every 3 months while she is receiving trastuzumab. After this discussion, she is agreeable to paclitaxel and trastuzumab as above. She has signed informed consent. TTE on 4/20/2020, EF 63%, next TTE due after #12, ordered. Paclitaxel/trastuzumab #11 today. Discussed oral and peripheral cryotherapy. She is not interested in cold caps    Dr. Dominique Carrel has placed port    rx for zofran, compazine, emla cream    Following chemotherapy, she is an excellent candidate for radiation and endocrine therapy    2. Right breast ILC, 7 mm, ER +, CA +, HER 2 negative, gr 1, 0/8 LN, stage IA both anatomic and prognostic    We explained to the patient that the goal of systemic adjuvant therapy is to improve the chances for cure and decrease the risk of relapse. We explained why a patient can have microscopic cancer spread now even though physical examination, laboratory studies and imaging studies are negative for cancer. We explained that the same treatments used now as adjuvant or preventive treatments rarely if ever are curative in women who develop metastases. Recommend endocrine therapy, see #1 above.   While she does not quite meet the criteria on this side for CALGB 9343 (76 yo instead of 70), she does meet KIT guidelines for possibly avoiding XRT on this side, though would defer to Dr. Wes Braden since XRT is indicated on the Left due to HER2 + disease. Recommend patient to make appointment with Dr. Wes Braden within next few weeks. 3. Emotional well being:  She has excellent support and is coping well with her disease    4. BRCA2 and RAD50 VUS:  Not clinically significant at this time    5. Neck itching:  resolved    6. Obesity:  Will monitor following chemo    7. Increased cr: Will monitor. Will check BMP today. 8. Neuropathy: To fingers/feet. 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 Radha Morrells care. Signed By: Ryann Valero MD      No orders of the defined types were placed in this encounter.

## 2020-06-30 NOTE — PROGRESS NOTES
[facility] VISIT NOTE    [id number]. Pt arrived at [facility] ambulatory and in no distress for C4D8 Taxol/Herceptin. Assessment completed, pt c/o numbness and tingling in hands and feet as well as hyperpigmentation in hands. RCW chest port accessed with . 75 in bliss with no difficulty. Positive blood return noted and labs drawn. Pt proceeded to MD flores. Labs resulted and are within parameters to treat. Medications received:  NS KVO  Pepcid IV  Benadryl IV  Dexamethasone IV  Herceptin IV  Taxol IV  1 Liter NS IV    Tolerated treatment well, no adverse reaction noted. Port de-accessed and flushed per protocol. Positive blood return noted. Patient Vitals for the past 12 hrs:   Temp Pulse Resp BP SpO2   06/30/20 1448 -- 88 -- 116/61 --   06/30/20 0955 98.4 °F (36.9 °C) 91 18 149/63 97 %     Recent Results (from the past 12 hour(s))   CBC WITH 3 PART DIFF    Collection Time: 06/30/20 10:05 AM   Result Value Ref Range    WBC 5.4 3.6 - 11.0 K/uL    RBC 3.66 (L) 3.80 - 5.20 M/uL    HGB 11.4 (L) 11.5 - 16.0 g/dL    HCT 32.7 (L) 35.0 - 47.0 %    MCV 89.3 80.0 - 99.0 FL    MCH 31.1 26.0 - 34.0 PG    MCHC 34.9 30.0 - 36.5 g/dL    RDW 16.5 (H) 11.8 - 15.8 %    PLATELET 491 505 - 929 K/uL    NEUTROPHILS 50 32 - 75 %    MIXED CELLS 9 3.2 - 16.9 %    LYMPHOCYTES 41 12 - 49 %    ABS. NEUTROPHILS 2.7 1.8 - 8.0 K/UL    ABS. MIXED CELLS 0.5 0.2 - 1.2 K/uL    ABS. LYMPHOCYTES 2.2 0.8 - 3.5 K/UL    DF AUTOMATED     METABOLIC PANEL, BASIC    Collection Time: 06/30/20 11:19 AM   Result Value Ref Range    Sodium 139 136 - 145 mmol/L    Potassium 3.7 3.5 - 5.1 mmol/L    Chloride 108 97 - 108 mmol/L    CO2 26 21 - 32 mmol/L    Anion gap 5 5 - 15 mmol/L    Glucose 174 (H) 65 - 100 mg/dL    BUN 13 6 - 20 MG/DL    Creatinine 1.13 (H) 0.55 - 1.02 MG/DL    BUN/Creatinine ratio 12 12 - 20      GFR est AA 58 (L) >60 ml/min/1.73m2    GFR est non-AA 48 (L) >60 ml/min/1.73m2    Calcium 8.6 8.5 - 10.1 MG/DL     1500.   D/C'd from [facility] ambulatory and in no distress.  Next appointment is 7/7/20 at 10:00 am.

## 2020-07-07 ENCOUNTER — HOSPITAL ENCOUNTER (OUTPATIENT)
Dept: INFUSION THERAPY | Age: 70
Discharge: HOME | End: 2020-07-07
Payer: MEDICARE

## 2020-07-07 VITALS
SYSTOLIC BLOOD PRESSURE: 124 MMHG | HEIGHT: 67 IN | RESPIRATION RATE: 18 BRPM | WEIGHT: 235.8 LBS | DIASTOLIC BLOOD PRESSURE: 64 MMHG | TEMPERATURE: 99.6 F | HEART RATE: 89 BPM | OXYGEN SATURATION: 95 % | BODY MASS INDEX: 37.01 KG/M2

## 2020-07-07 DIAGNOSIS — C50.912 BILATERAL MALIGNANT NEOPLASM OF BREAST IN FEMALE, ESTROGEN RECEPTOR POSITIVE, UNSPECIFIED SITE OF BREAST (HCC): Primary | ICD-10-CM

## 2020-07-07 DIAGNOSIS — C50.911 BILATERAL MALIGNANT NEOPLASM OF BREAST IN FEMALE, ESTROGEN RECEPTOR POSITIVE, UNSPECIFIED SITE OF BREAST (HCC): Primary | ICD-10-CM

## 2020-07-07 DIAGNOSIS — Z17.0 BILATERAL MALIGNANT NEOPLASM OF BREAST IN FEMALE, ESTROGEN RECEPTOR POSITIVE, UNSPECIFIED SITE OF BREAST (HCC): Primary | ICD-10-CM

## 2020-07-07 LAB
BASO+EOS+MONOS # BLD AUTO: 0.4 K/UL (ref 0.2–1.2)
BASO+EOS+MONOS NFR BLD AUTO: 7 % (ref 3.2–16.9)
DIFFERENTIAL METHOD BLD: ABNORMAL
ERYTHROCYTE [DISTWIDTH] IN BLOOD BY AUTOMATED COUNT: 17 % (ref 11.8–15.8)
HCT VFR BLD AUTO: 32.6 % (ref 35–47)
HGB BLD-MCNC: 11.3 G/DL (ref 11.5–16)
LYMPHOCYTES # BLD: 1.9 K/UL (ref 0.8–3.5)
LYMPHOCYTES NFR BLD: 38 % (ref 12–49)
MCH RBC QN AUTO: 31.5 PG (ref 26–34)
MCHC RBC AUTO-ENTMCNC: 34.7 G/DL (ref 30–36.5)
MCV RBC AUTO: 90.8 FL (ref 80–99)
NEUTS SEG # BLD: 2.7 K/UL (ref 1.8–8)
NEUTS SEG NFR BLD: 55 % (ref 32–75)
PLATELET # BLD AUTO: 318 K/UL (ref 150–400)
RBC # BLD AUTO: 3.59 M/UL (ref 3.8–5.2)
WBC # BLD AUTO: 5 K/UL (ref 3.6–11)

## 2020-07-07 PROCEDURE — 77030016057 HC NDL HUBR APOL -B

## 2020-07-07 PROCEDURE — 74011250636 HC RX REV CODE- 250/636: Performed by: INTERNAL MEDICINE

## 2020-07-07 PROCEDURE — 36415 COLL VENOUS BLD VENIPUNCTURE: CPT

## 2020-07-07 PROCEDURE — 96417 CHEMO IV INFUS EACH ADDL SEQ: CPT

## 2020-07-07 PROCEDURE — 74011000250 HC RX REV CODE- 250: Performed by: INTERNAL MEDICINE

## 2020-07-07 PROCEDURE — 85025 COMPLETE CBC W/AUTO DIFF WBC: CPT

## 2020-07-07 PROCEDURE — 36591 DRAW BLOOD OFF VENOUS DEVICE: CPT

## 2020-07-07 PROCEDURE — 96375 TX/PRO/DX INJ NEW DRUG ADDON: CPT

## 2020-07-07 PROCEDURE — 96413 CHEMO IV INFUSION 1 HR: CPT

## 2020-07-07 RX ORDER — DIPHENHYDRAMINE HYDROCHLORIDE 50 MG/ML
50 INJECTION, SOLUTION INTRAMUSCULAR; INTRAVENOUS
Status: CANCELLED | OUTPATIENT
Start: 2020-07-14

## 2020-07-07 RX ORDER — ACETAMINOPHEN 325 MG/1
650 TABLET ORAL AS NEEDED
Status: CANCELLED
Start: 2020-07-14

## 2020-07-07 RX ORDER — DEXAMETHASONE SODIUM PHOSPHATE 4 MG/ML
10 INJECTION, SOLUTION INTRA-ARTICULAR; INTRALESIONAL; INTRAMUSCULAR; INTRAVENOUS; SOFT TISSUE ONCE
Status: COMPLETED | OUTPATIENT
Start: 2020-07-07 | End: 2020-07-07

## 2020-07-07 RX ORDER — SODIUM CHLORIDE 9 MG/ML
25 INJECTION, SOLUTION INTRAVENOUS CONTINUOUS
Status: DISPENSED | OUTPATIENT
Start: 2020-07-07 | End: 2020-07-07

## 2020-07-07 RX ORDER — DIPHENHYDRAMINE HYDROCHLORIDE 50 MG/ML
50 INJECTION, SOLUTION INTRAMUSCULAR; INTRAVENOUS ONCE
Status: COMPLETED | OUTPATIENT
Start: 2020-07-07 | End: 2020-07-07

## 2020-07-07 RX ORDER — ACETAMINOPHEN 325 MG/1
650 TABLET ORAL
Status: CANCELLED | OUTPATIENT
Start: 2020-07-14

## 2020-07-07 RX ORDER — ALBUTEROL SULFATE 0.83 MG/ML
2.5 SOLUTION RESPIRATORY (INHALATION) AS NEEDED
Status: CANCELLED
Start: 2020-07-14

## 2020-07-07 RX ORDER — HYDROCORTISONE SODIUM SUCCINATE 100 MG/2ML
100 INJECTION, POWDER, FOR SOLUTION INTRAMUSCULAR; INTRAVENOUS AS NEEDED
Status: CANCELLED | OUTPATIENT
Start: 2020-07-14

## 2020-07-07 RX ORDER — DIPHENHYDRAMINE HYDROCHLORIDE 50 MG/ML
25 INJECTION, SOLUTION INTRAMUSCULAR; INTRAVENOUS AS NEEDED
Status: CANCELLED
Start: 2020-07-14

## 2020-07-07 RX ORDER — ONDANSETRON 2 MG/ML
8 INJECTION INTRAMUSCULAR; INTRAVENOUS AS NEEDED
Status: CANCELLED | OUTPATIENT
Start: 2020-07-14

## 2020-07-07 RX ORDER — DIPHENHYDRAMINE HYDROCHLORIDE 50 MG/ML
50 INJECTION, SOLUTION INTRAMUSCULAR; INTRAVENOUS AS NEEDED
Status: CANCELLED
Start: 2020-07-14

## 2020-07-07 RX ORDER — SODIUM CHLORIDE 9 MG/ML
10 INJECTION INTRAMUSCULAR; INTRAVENOUS; SUBCUTANEOUS AS NEEDED
Status: ACTIVE | OUTPATIENT
Start: 2020-07-07 | End: 2020-07-07

## 2020-07-07 RX ORDER — HEPARIN 100 UNIT/ML
300-500 SYRINGE INTRAVENOUS AS NEEDED
Status: ACTIVE | OUTPATIENT
Start: 2020-07-07 | End: 2020-07-07

## 2020-07-07 RX ORDER — EPINEPHRINE 1 MG/ML
0.3 INJECTION, SOLUTION, CONCENTRATE INTRAVENOUS AS NEEDED
Status: CANCELLED | OUTPATIENT
Start: 2020-07-14

## 2020-07-07 RX ORDER — SODIUM CHLORIDE 0.9 % (FLUSH) 0.9 %
10 SYRINGE (ML) INJECTION AS NEEDED
Status: DISPENSED | OUTPATIENT
Start: 2020-07-07 | End: 2020-07-07

## 2020-07-07 RX ADMIN — SODIUM CHLORIDE 10 ML: 9 INJECTION INTRAMUSCULAR; INTRAVENOUS; SUBCUTANEOUS at 13:58

## 2020-07-07 RX ADMIN — TRASTUZUMAB 210 MG: 150 INJECTION, POWDER, LYOPHILIZED, FOR SOLUTION INTRAVENOUS at 11:36

## 2020-07-07 RX ADMIN — DIPHENHYDRAMINE HYDROCHLORIDE 50 MG: 50 INJECTION INTRAMUSCULAR; INTRAVENOUS at 11:13

## 2020-07-07 RX ADMIN — HEPARIN 500 UNITS: 100 SYRINGE at 13:59

## 2020-07-07 RX ADMIN — PACLITAXEL 178 MG: 300 INJECTION INTRAVENOUS at 12:45

## 2020-07-07 RX ADMIN — FAMOTIDINE 20 MG: 10 INJECTION, SOLUTION INTRAVENOUS at 11:04

## 2020-07-07 RX ADMIN — DEXAMETHASONE SODIUM PHOSPHATE 10 MG: 4 INJECTION, SOLUTION INTRAMUSCULAR; INTRAVENOUS at 11:08

## 2020-07-07 RX ADMIN — SODIUM CHLORIDE 25 ML/HR: 900 INJECTION, SOLUTION INTRAVENOUS at 11:03

## 2020-07-07 NOTE — PROGRESS NOTES
[facility] Progress Note    Date: 2020    Name: Krissy Ng    MRN: [id number]         : 1950    Ms. Acuña Arrived ambulatory and in no distress for C4D15 of Taxol/Herceptin Regimen. Assessment was completed, no acute issues at this time, no new complaints voiced. Right chest wall port accessed without difficulty, labs drawn & sent for processing. Chemotherapy Flowsheet 2020   Cycle C4D15   Date 2020   Drug / Regimen Taxol/Herceptin   Post Hydration -   Pre Meds given   Notes given            Ms. Sylvie Meadows vitals were reviewed. Visit Vitals  /67   Pulse 94   Temp 99.6 °F (37.6 °C)   Resp 18   Ht 5' 7\" (1.702 m)   Wt 107 kg (235 lb 12.8 oz)   SpO2 95%   BMI 36.93 kg/m²       Lab results were obtained and reviewed. Recent Results (from the past 12 hour(s))   CBC WITH 3 PART DIFF    Collection Time: 20 10:16 AM   Result Value Ref Range    WBC 5.0 3.6 - 11.0 K/uL    RBC 3.59 (L) 3.80 - 5.20 M/uL    HGB 11.3 (L) 11.5 - 16.0 g/dL    HCT 32.6 (L) 35.0 - 47.0 %    MCV 90.8 80.0 - 99.0 FL    MCH 31.5 26.0 - 34.0 PG    MCHC 34.7 30.0 - 36.5 g/dL    RDW 17.0 (H) 11.8 - 15.8 %    PLATELET 455 679 - 758 K/uL    NEUTROPHILS 55 32 - 75 %    MIXED CELLS 7 3.2 - 16.9 %    LYMPHOCYTES 38 12 - 49 %    ABS. NEUTROPHILS 2.7 1.8 - 8.0 K/UL    ABS. MIXED CELLS 0.4 0.2 - 1.2 K/uL    ABS.  LYMPHOCYTES 1.9 0.8 - 3.5 K/UL    DF AUTOMATED         Medications:  Medications Administered     0.9% sodium chloride infusion     Admin Date  2020 Action  New Bag Dose  25 mL/hr Rate  25 mL/hr Route  IntraVENous Administered By  Yvon Swanson RN          0.9% sodium chloride injection 10 mL     Admin Date  2020 Action  Given Dose  10 mL Route  IntraVENous Administered By  Reginia Job, RN          dexamethasone (DECADRON) 4 mg/mL injection 10 mg     Admin Date  2020 Action  Given Dose  10 mg Route  IntraVENous Administered By  Yvon Swanson RN          diphenhydrAMINE (BENADRYL) injection 50 mg     Admin Date  07/07/2020 Action  Given Dose  50 mg Route  IntraVENous Administered By  Yvon Swanson RN          famotidine (PF) (PEPCID) 20 mg in 0.9% sodium chloride 10 mL injection     Admin Date  07/07/2020 Action  Given Dose  20 mg Route  IntraVENous Administered By  Yvon Swanson RN          heparin (porcine) pf 300-500 Units     Admin Date  07/07/2020 Action  Given Dose  500 Units Route  InterCATHeter Administered By  Yvon Swanson RN          PACLitaxeL (TAXOL) 178 mg in 0.9% sodium chloride 250 mL, overfill volume 25 mL chemo infusion     Admin Date  07/07/2020 Action  New Bag Dose  178 mg Rate  304.7 mL/hr Route  IntraVENous Administered By  Yvon Swanson RN          trastuzumab (HERCEPTIN) 210 mg in 0.9% sodium chloride 250 mL, overfill volume 25 mL IVPB     Admin Date  07/07/2020 Action  New Bag Dose  210 mg Rate  570 mL/hr Route  IntraVENous Administered By  Yvon Swanson RN                Ms. Zelda Hernandez tolerated treatment well and was discharged from [place] [address] in stable condition. Port de-accessed, flushed & heparinized per protocol. She is to return on  for her next appointment on 7/14/20.     David Miranda RN  July 7, 2020

## 2020-07-14 ENCOUNTER — TELEPHONE (OUTPATIENT)
Dept: ONCOLOGY | Age: 70
End: 2020-07-14

## 2020-07-14 ENCOUNTER — OFFICE VISIT (OUTPATIENT)
Dept: ONCOLOGY | Age: 70
End: 2020-07-14

## 2020-07-14 ENCOUNTER — HOSPITAL ENCOUNTER (OUTPATIENT)
Dept: INFUSION THERAPY | Age: 70
Discharge: HOME | End: 2020-07-14
Payer: MEDICARE

## 2020-07-14 VITALS
HEART RATE: 85 BPM | DIASTOLIC BLOOD PRESSURE: 68 MMHG | SYSTOLIC BLOOD PRESSURE: 130 MMHG | BODY MASS INDEX: 37.04 KG/M2 | WEIGHT: 236 LBS | RESPIRATION RATE: 15 BRPM | TEMPERATURE: 97.6 F | OXYGEN SATURATION: 97 % | HEIGHT: 67 IN

## 2020-07-14 VITALS
DIASTOLIC BLOOD PRESSURE: 67 MMHG | TEMPERATURE: 97.6 F | HEIGHT: 67 IN | RESPIRATION RATE: 18 BRPM | HEART RATE: 90 BPM | BODY MASS INDEX: 37.04 KG/M2 | OXYGEN SATURATION: 97 % | SYSTOLIC BLOOD PRESSURE: 129 MMHG | WEIGHT: 236 LBS

## 2020-07-14 DIAGNOSIS — C50.911 BILATERAL MALIGNANT NEOPLASM OF BREAST IN FEMALE, ESTROGEN RECEPTOR POSITIVE, UNSPECIFIED SITE OF BREAST (HCC): Primary | ICD-10-CM

## 2020-07-14 DIAGNOSIS — Z51.11 CHEMOTHERAPY MANAGEMENT, ENCOUNTER FOR: ICD-10-CM

## 2020-07-14 DIAGNOSIS — Z51.81 ENCOUNTER FOR MONITORING CARDIOTOXIC DRUG THERAPY: ICD-10-CM

## 2020-07-14 DIAGNOSIS — Z17.0 BILATERAL MALIGNANT NEOPLASM OF BREAST IN FEMALE, ESTROGEN RECEPTOR POSITIVE, UNSPECIFIED SITE OF BREAST (HCC): Primary | ICD-10-CM

## 2020-07-14 DIAGNOSIS — C50.912 BILATERAL MALIGNANT NEOPLASM OF BREAST IN FEMALE, ESTROGEN RECEPTOR POSITIVE, UNSPECIFIED SITE OF BREAST (HCC): Primary | ICD-10-CM

## 2020-07-14 DIAGNOSIS — Z79.899 ENCOUNTER FOR MONITORING CARDIOTOXIC DRUG THERAPY: ICD-10-CM

## 2020-07-14 DIAGNOSIS — R79.89 ELEVATED SERUM CREATININE: ICD-10-CM

## 2020-07-14 DIAGNOSIS — G62.9 NEUROPATHY: ICD-10-CM

## 2020-07-14 LAB
ANION GAP SERPL CALC-SCNC: 6 MMOL/L (ref 5–15)
BUN SERPL-MCNC: 12 MG/DL (ref 6–20)
BUN/CREAT SERPL: 11 (ref 12–20)
CALCIUM SERPL-MCNC: 8.5 MG/DL (ref 8.5–10.1)
CHLORIDE SERPL-SCNC: 108 MMOL/L (ref 97–108)
CO2 SERPL-SCNC: 25 MMOL/L (ref 21–32)
CREAT SERPL-MCNC: 1.06 MG/DL (ref 0.55–1.02)
GLUCOSE SERPL-MCNC: 205 MG/DL (ref 65–100)
POTASSIUM SERPL-SCNC: 3.6 MMOL/L (ref 3.5–5.1)
SODIUM SERPL-SCNC: 139 MMOL/L (ref 136–145)

## 2020-07-14 PROCEDURE — 77030016057 HC NDL HUBR APOL -B

## 2020-07-14 PROCEDURE — 96413 CHEMO IV INFUSION 1 HR: CPT

## 2020-07-14 PROCEDURE — 36591 DRAW BLOOD OFF VENOUS DEVICE: CPT

## 2020-07-14 PROCEDURE — 74011000258 HC RX REV CODE- 258: Performed by: INTERNAL MEDICINE

## 2020-07-14 PROCEDURE — 36415 COLL VENOUS BLD VENIPUNCTURE: CPT

## 2020-07-14 PROCEDURE — 74011250636 HC RX REV CODE- 250/636: Performed by: INTERNAL MEDICINE

## 2020-07-14 PROCEDURE — 80048 BASIC METABOLIC PNL TOTAL CA: CPT

## 2020-07-14 PROCEDURE — 74011000250 HC RX REV CODE- 250: Performed by: INTERNAL MEDICINE

## 2020-07-14 RX ORDER — HEPARIN 100 UNIT/ML
300-500 SYRINGE INTRAVENOUS AS NEEDED
Status: ACTIVE | OUTPATIENT
Start: 2020-07-14 | End: 2020-07-14

## 2020-07-14 RX ORDER — SODIUM CHLORIDE 9 MG/ML
25 INJECTION, SOLUTION INTRAVENOUS CONTINUOUS
Status: DISPENSED | OUTPATIENT
Start: 2020-07-14 | End: 2020-07-14

## 2020-07-14 RX ORDER — SODIUM CHLORIDE 0.9 % (FLUSH) 0.9 %
10 SYRINGE (ML) INJECTION AS NEEDED
Status: DISPENSED | OUTPATIENT
Start: 2020-07-14 | End: 2020-07-14

## 2020-07-14 RX ORDER — SODIUM CHLORIDE 9 MG/ML
10 INJECTION INTRAMUSCULAR; INTRAVENOUS; SUBCUTANEOUS AS NEEDED
Status: ACTIVE | OUTPATIENT
Start: 2020-07-14 | End: 2020-07-14

## 2020-07-14 RX ADMIN — HEPARIN 500 UNITS: 100 SYRINGE at 11:14

## 2020-07-14 RX ADMIN — SODIUM CHLORIDE 10 ML: 9 INJECTION, SOLUTION INTRAMUSCULAR; INTRAVENOUS; SUBCUTANEOUS at 11:14

## 2020-07-14 RX ADMIN — SODIUM CHLORIDE 10 ML: 9 INJECTION, SOLUTION INTRAMUSCULAR; INTRAVENOUS; SUBCUTANEOUS at 09:16

## 2020-07-14 RX ADMIN — TRASTUZUMAB 630 MG: 150 INJECTION, POWDER, LYOPHILIZED, FOR SOLUTION INTRAVENOUS at 10:39

## 2020-07-14 RX ADMIN — SODIUM CHLORIDE 25 ML/HR: 900 INJECTION, SOLUTION INTRAVENOUS at 10:36

## 2020-07-14 RX ADMIN — SODIUM CHLORIDE 10 ML: 9 INJECTION, SOLUTION INTRAMUSCULAR; INTRAVENOUS; SUBCUTANEOUS at 09:17

## 2020-07-14 NOTE — PROGRESS NOTES
Verified Results  * MAMMO SCREENING BILATERAL W CAD [id number] 01:28PM Lexi Broderick   TW Order Number: [id number]    - Patient Instructions: To schedule this appointment, please contact Central Scheduling at [phone]  Do not wear any perfume, powder, lotion or deodorant on breast or underarm area  Please bring your doctors order, referral (if needed) and insurance information with you on the day of the test  Failure to bring this information may result in this test being rescheduled  Arrive 15 minutes prior to your appointment time to register  On the day of your test, please bring any prior mammogram or breast studies with you that were not performed at a [facility]  Failure to bring prior exams may result in your test needing to be rescheduled   Order Number: [id number]    - Patient Instructions: To schedule this appointment, please contact Central Scheduling at [phone]  Do not wear any perfume, powder, lotion or deodorant on breast or underarm area  Please bring your doctors order, referral (if needed) and insurance information with you on the day of the test  Failure to bring this information may result in this test being rescheduled  Arrive 15 minutes prior to your appointment time to register  On the day of your test, please bring any prior mammogram or breast studies with you that were not performed at a [facility]  Failure to bring prior exams may result in your test needing to be rescheduled  Test Name Result Flag Reference   MAMMO SCREENING BILATERAL W CAD (Report)     Patient History:   Patient is postmenopausal    Family history of breast cancer in maternal aunt at age 44  Took estrogen for 15 years  Patient has never smoked  Patient's BMI is 28 7  Reason for exam: screening (asymptomatic)  Mammo Screening Bilateral W CAD: November 23, 2016 - Check In #:    [de-identified]   Bilateral MLO, CC, and XCCL view(s) were taken       Technologist: Yamilet Mcdonald is a 71 y.o. female Follow up for the Evaluation of Breast Cancer. 1. Have you been to the ER, urgent care clinic since your last visit? Hospitalized since your last visit? No    2. Have you seen or consulted any other health care providers outside of the [address] [place] [address] since your last visit? Include any pap smears or colon screening.  No RONAK Wolf (R)(M)   Prior study comparison: August 29, 2013, digital bilateral    screening mammogram performed at [facility] [address]  May 24, 2011, digital bilateral screening    mammogram performed at [facility]  January 22, 2010, digital bilateral screening mammogram    performed at y [address]  November 13, 2008, bilateral OPMA DIGITAL SCRN MAMMO w/CAD,    performed at [address]  There are scattered fibroglandular densities  The parenchymal pattern appears stable  No dominant soft tissue    mass or suspicious calcifications are noted  Stable    calcifications are identified in the outer right breast  The skin   and nipple contours are within normal limits  No mammographic evidence of malignancy  No    significant changes when compared with prior studies  ASSESSMENT: BiRad:1 - Negative     Recommendation:   Routine screening mammogram in 1 year  A reminder letter will be   scheduled  Analyzed by CAD     8-10% of cancers will be missed on mammography  Management of a    palpable abnormality must be based on clinical grounds  Patients   will be notified of their results via letter from our facility  Accredited by [employer] of Radiology and FDA       Transcription Location: [facility] 98: [id number]     Risk Value(s):   Tyrer-Cuzick 10 Year: 3 304%, Tyrer-Cuzick Lifetime: 5 221%,    Myriad Table: 2 6%, CIRO 5 Year: 1 5%, NCI Lifetime: 4 5%   Signed by:   Rashid Crawford MD   11/23/16

## 2020-07-14 NOTE — PROGRESS NOTES
\[facility] [place] [facility]\"" Progress Note    Date: 2020    Name: Maddi Zamora    MRN: [id number]         : 1950    Ms. Jesusita Stewart Arrived ambulatory and in no distress for C5D1 of Herceptin Regimen. Assessment was completed, no acute issues at this time, no new complaints voiced. Right chest wall port accessed without difficulty, labs drawn & sent for processing. Echo completed 20, EF 63%. Patient proceed to appointment with Dr. Maryse Mirza. Ms. Howard Will vitals were reviewed. Patient Vitals for the past 12 hrs:   Temp Pulse Resp BP SpO2   20 0907 97.6 °F (36.4 °C) 90 18 129/67 97 %     MD ordered BMP. BMP drawn from port. Medications:  Herceptin IV    Ms. Barrios tolerated treatment well and was discharged from [place] [address] in stable condition. Port de-accessed, flushed & heparinized per protocol. She is to return on 20 for her next appointment.     Angelique Alejandro RN  2020

## 2020-07-14 NOTE — TELEPHONE ENCOUNTER
7/14/2020 1:05 PM: Called patient and advised that her creatinine is elevated and Ra Garcia, PRAMOD, recommends that she drink plenty of water today since the patient has already left the infusion center. Patient verbalized understanding and denied questions or concerns.

## 2020-07-14 NOTE — PROGRESS NOTES
[facility] [place] [address] [facility] Pal: [phone]  F: [phone]      Reason for Visit:   Laura Yin is a 71 y.o. female who is seen in consultation at the request of Dr. Neftali Roca for evaluation of therapy for breast cancer. Rad onc:  Dr. Sobia Farley    Treatment History:   · 1/24/20 right breast mass:  9:00 core bx, IMC, gr 1, 5 mm, ER + at 99%, LA + at 97%, HER 2 negative at IHC 0, ki67 8%; left breast mass: 3:00 core bx, IMC, gr 3,  5 mm, ER + at 98%, LA negative, HER 2 POSITIVE at [facility] 3+  · invitae genetic testing:  BRCA2 and RAD50 VUS  · 3/23/20 right breast lumpectomy:  ILC, 7 mm, gr 1, no LVI, 0/8 LN, pT1b pN0 cMo; left breast lumpectomy:  IDC, 1.1 cm, gr 3, 0/2 LN, DCIS present, no LVI, pT1c pN0 cM0  · Paclitaxel/trastuzumab 4/21/20-7/7/2020  · Outback Herceptin 7/14/2020-    History of Present Illness: An abnormal mammogram led to the pathology above    Interval history:  In today for follow up and treatment. Complains of gr 2 fatigue, gr 1 chills, gr 2 hot flashes, gr 2 neuropathy. FH:  No breast or ovarian cancer, no prostate or pancreas cancer    Past Medical History:   Diagnosis Date    Cancer [facility])       History reviewed. No pertinent surgical history. Social History     Tobacco Use    Smoking status: Never Smoker    Smokeless tobacco: Never Used   Substance Use Topics    Alcohol use: Not Currently      History reviewed. No pertinent family history. Current Outpatient Medications   Medication Sig    ergocalciferol (ERGOCALCIFEROL) 1,250 mcg (50,000 unit) capsule TK 1 C PO 1 TIME A WK    losartan (COZAAR) 50 mg tablet TK 1 T PO QD    omeprazole (PRILOSEC) 20 mg capsule TK 1 C PO QD    rosuvastatin (CRESTOR) 20 mg tablet TK 1 T PO QD    aspirin delayed-release 81 mg tablet Take  by mouth daily.  lidocaine-prilocaine (EMLA) topical cream Apply  to affected area as needed for Pain.     prochlorperazine (COMPAZINE) 10 mg tablet Take 1 Tab by mouth every six (6) hours as needed for Nausea.  ondansetron hcl (ZOFRAN) 8 mg tablet Take 1 Tab by mouth every eight (8) hours as needed for Nausea. No current facility-administered medications for this visit. Facility-Administered Medications Ordered in Other Visits   Medication Dose Route Frequency    saline peripheral flush soln 10 mL  10 mL InterCATHeter PRN    0.9% sodium chloride injection 10 mL  10 mL IntraVENous PRN    heparin (porcine) pf 300-500 Units  300-500 Units InterCATHeter PRN      No Known Allergies     Review of Systems: A complete review of systems was obtained, negative except as described above. Physical Exam:     Visit Vitals  /67 (BP 1 Location: Left arm, BP Patient Position: Sitting)   Pulse 90   Temp 97.6 °F (36.4 °C) (Temporal)   Resp 18   Ht 5' 7\" (1.702 m)   Wt 236 lb (107 kg)   SpO2 97%   BMI 36.96 kg/m²     General: No distress  Eyes: Anicteric sclerae  HENT: Atraumatic  Neck: Supple  Respiratory: Normal respiratory effort  CV: No peripheral edema  GI: nondistended  Skin: No rashes, ecchymoses, or petechiae  Psych: Alert, oriented, appropriate affect, normal judgment/insight      Results:     Lab Results   Component Value Date/Time    WBC 5.0 07/07/2020 10:16 AM    HGB 11.3 (L) 07/07/2020 10:16 AM    HCT 32.6 (L) 07/07/2020 10:16 AM    PLATELET 234 21/26/1853 10:16 AM    MCV 90.8 07/07/2020 10:16 AM    ABS.  NEUTROPHILS 2.7 07/07/2020 10:16 AM     Lab Results   Component Value Date/Time    Sodium 139 06/30/2020 11:19 AM    Potassium 3.7 06/30/2020 11:19 AM    Chloride 108 06/30/2020 11:19 AM    CO2 26 06/30/2020 11:19 AM    Glucose 174 (H) 06/30/2020 11:19 AM    BUN 13 06/30/2020 11:19 AM    Creatinine 1.13 (H) 06/30/2020 11:19 AM    GFR est AA 58 (L) 06/30/2020 11:19 AM    GFR est non-AA 48 (L) 06/30/2020 11:19 AM    Calcium 8.6 06/30/2020 11:19 AM     Lab Results   Component Value Date/Time    Bilirubin, total 0.6 06/23/2020 09:48 AM    ALT (SGPT) 32 06/23/2020 09:48 AM    Alk. phosphatase 90 06/23/2020 09:48 AM    Protein, total 6.9 06/23/2020 09:48 AM    Albumin 3.3 (L) 06/23/2020 09:48 AM    Globulin 3.6 06/23/2020 09:48 AM       2/11/20 MRI breast  Left:  11 mm mass at 3:00  Right:  9:00, 5 mm mass  No LAD    4/20/20 TTE EF 63%    Records reviewed and summarized above. Pathology report(s) reviewed above. Radiology report(s) reviewed above. Assessment/plan:   1. Left breast IDC, 1.1 cm, 0/2 LN, ER +, ND negative, HER 2 POSITIVE, gr 3:  Stage IA (both anatomic and prognostic)    We explained to the patient that the goal of systemic adjuvant therapy is to improve the chances for cure and decrease the risk of relapse. We explained why a patient can have microscopic cancer spread now even though physical examination, laboratory studies and imaging studies are negative for cancer. We explained that the same treatments used now as adjuvant or preventive treatments rarely if ever are curative in women who develop metastases. Using eprognosis. org, her 5 year all cause mortality risk is only 6-8%; her 10 year all cause mortality risk is 15-23%; her median OS is 17-21 years. An adjuvant conversation is warranted. Discussed weekly paclitaxel 80 mg/m2 with weekly trastuzumab 4 mg/kg load then 2 mg/kg weekly x 12 followed by completion of a year of trastuzumab 6 mg/kg q 3 weeks. This is APT study. I discussed the potential risks of paclitaxel chemotherapy with the patient. Major toxicities include nausea and vomiting, stomatitis, fatigue. Anemia frequently results. Neutropenic fever is uncommon, but can be a life-threatening problem.  We provided the patient with detailed information concerning toxicity including frequent toxicities that only last a few days, such as nausea, vomiting, mouth sores, arthralgia, myalgia, and potentially allergic reactions to paclitaxel, as well as toxicities which can be longer lasting including fatigue, anemia and neuropathy. We provided the patient with detailed information concerning the toxicities of their regimen in addition to our verbal discussion. Rationale for therapy with trastuzumab was also discussed with the patient including a 50% proportional improvement in disease free survival and also an improvement in overall survival in patients receiving trastuzumab and chemotherapy for HER-2 positive breast cancer. The side effects of trastuzumab were discussed including a 4%-5% risk of dropping her ejection fraction while on treatment and about a 1% risk of CHF. We discussed that this drug will be used every 3 weeks for remainder of a year following the chemotherapy cycles. We will check her EF before chemotherapy and every 3 months while she is receiving trastuzumab. After this discussion, she is agreeable to paclitaxel and trastuzumab as above. She has signed informed consent. TTE on 4/20/2020, EF 63%, TTE on 7/13/2020, prelim 63%. Outback Herceptin 1/13 today. We will see her back in 6 weeks for #3. Discussed oral and peripheral cryotherapy. She is not interested in cold caps    Dr. Michelle Alonso has placed port    rx for zofran, compazine, emla cream    Following chemotherapy, she is an excellent candidate for radiation and endocrine therapy. She may proceed with XRT, sees Dr. Tess Linares next week. We will start endocrine therapy following the completion of radiation    2. Right breast ILC, 7 mm, ER +, AR +, HER 2 negative, gr 1, 0/8 LN, stage IA both anatomic and prognostic    We explained to the patient that the goal of systemic adjuvant therapy is to improve the chances for cure and decrease the risk of relapse. We explained why a patient can have microscopic cancer spread now even though physical examination, laboratory studies and imaging studies are negative for cancer.  We explained that the same treatments used now as adjuvant or preventive treatments rarely if ever are curative in women who develop metastases. Recommend endocrine therapy, see #1 above. While she does not quite meet the criteria on this side for CALGB 9343 (76 yo instead of 70), she does meet KIT guidelines for possibly avoiding XRT on this side, though would defer to Dr. Taty Allen since XRT is indicated on the Left due to HER2 + disease. 3. Emotional well being:  She has excellent support and is coping well with her disease    4. BRCA2 and RAD50 VUS:  Not clinically significant at this time    5. Neck itching:  resolved    6. Obesity:  Will monitor following chemo    7. Increased cr: Will monitor. Will check BMP today. 8. 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 Discussed starting Cymbalta, she declines at this time, will notify us if this worsens. I appreciate the opportunity to participate in Ms. Vanessa sawyer. Signed By: Lupe Sky MD      No orders of the defined types were placed in this encounter.

## 2020-07-28 RX ORDER — ACETAMINOPHEN 325 MG/1
650 TABLET ORAL AS NEEDED
Status: CANCELLED
Start: 2020-08-04

## 2020-07-28 RX ORDER — ACETAMINOPHEN 325 MG/1
650 TABLET ORAL
Status: CANCELLED | OUTPATIENT
Start: 2020-08-04

## 2020-07-28 RX ORDER — ALBUTEROL SULFATE 0.83 MG/ML
2.5 SOLUTION RESPIRATORY (INHALATION) AS NEEDED
Status: CANCELLED
Start: 2020-08-04

## 2020-07-28 RX ORDER — EPINEPHRINE 1 MG/ML
0.3 INJECTION, SOLUTION, CONCENTRATE INTRAVENOUS AS NEEDED
Status: CANCELLED | OUTPATIENT
Start: 2020-08-04

## 2020-07-28 RX ORDER — DIPHENHYDRAMINE HYDROCHLORIDE 50 MG/ML
50 INJECTION, SOLUTION INTRAMUSCULAR; INTRAVENOUS AS NEEDED
Status: CANCELLED
Start: 2020-08-04

## 2020-07-28 RX ORDER — HYDROCORTISONE SODIUM SUCCINATE 100 MG/2ML
100 INJECTION, POWDER, FOR SOLUTION INTRAMUSCULAR; INTRAVENOUS AS NEEDED
Status: CANCELLED | OUTPATIENT
Start: 2020-08-04

## 2020-07-28 RX ORDER — DIPHENHYDRAMINE HYDROCHLORIDE 50 MG/ML
50 INJECTION, SOLUTION INTRAMUSCULAR; INTRAVENOUS
Status: CANCELLED | OUTPATIENT
Start: 2020-08-04

## 2020-07-28 RX ORDER — ONDANSETRON 2 MG/ML
8 INJECTION INTRAMUSCULAR; INTRAVENOUS AS NEEDED
Status: CANCELLED | OUTPATIENT
Start: 2020-08-04

## 2020-07-28 RX ORDER — DIPHENHYDRAMINE HYDROCHLORIDE 50 MG/ML
25 INJECTION, SOLUTION INTRAMUSCULAR; INTRAVENOUS AS NEEDED
Status: CANCELLED
Start: 2020-08-04

## 2020-07-31 ENCOUNTER — TELEPHONE (OUTPATIENT)
Dept: ONCOLOGY | Age: 70
End: 2020-07-31

## 2020-07-31 NOTE — TELEPHONE ENCOUNTER
7/31/2020 5:28 PM: Called patient to discuss echo results. No answer; left voicemail requesting call back. ----- Message from Yesy Vickers NP sent at 7/31/2020 10:14 AM EDT -----  Please let her know this is normal.  Thanks!

## 2020-08-03 ENCOUNTER — TELEPHONE (OUTPATIENT)
Dept: ONCOLOGY | Age: 70
End: 2020-08-03

## 2020-08-03 NOTE — TELEPHONE ENCOUNTER
Patient called this morning leaving a voicemail stating that she was returning a call from [facility].     Call back [phone]

## 2020-08-03 NOTE — TELEPHONE ENCOUNTER
8/3/2020 1:36 PM: Called patient and advised that per Marisol Laureano NP, her echocardiogram was normal. Patient verbalized understanding and denied questions or concerns.

## 2020-08-04 ENCOUNTER — HOSPITAL ENCOUNTER (OUTPATIENT)
Dept: INFUSION THERAPY | Age: 70
Discharge: HOME | End: 2020-08-04
Payer: MEDICARE

## 2020-08-04 VITALS
HEIGHT: 67 IN | SYSTOLIC BLOOD PRESSURE: 137 MMHG | TEMPERATURE: 98.8 F | RESPIRATION RATE: 16 BRPM | DIASTOLIC BLOOD PRESSURE: 75 MMHG | BODY MASS INDEX: 35.69 KG/M2 | WEIGHT: 227.4 LBS | HEART RATE: 82 BPM | OXYGEN SATURATION: 98 %

## 2020-08-04 DIAGNOSIS — C50.911 BILATERAL MALIGNANT NEOPLASM OF BREAST IN FEMALE, ESTROGEN RECEPTOR POSITIVE, UNSPECIFIED SITE OF BREAST (HCC): Primary | ICD-10-CM

## 2020-08-04 DIAGNOSIS — C50.912 BILATERAL MALIGNANT NEOPLASM OF BREAST IN FEMALE, ESTROGEN RECEPTOR POSITIVE, UNSPECIFIED SITE OF BREAST (HCC): Primary | ICD-10-CM

## 2020-08-04 DIAGNOSIS — Z17.0 BILATERAL MALIGNANT NEOPLASM OF BREAST IN FEMALE, ESTROGEN RECEPTOR POSITIVE, UNSPECIFIED SITE OF BREAST (HCC): Primary | ICD-10-CM

## 2020-08-04 PROCEDURE — 74011250636 HC RX REV CODE- 250/636: Performed by: INTERNAL MEDICINE

## 2020-08-04 PROCEDURE — 77030016057 HC NDL HUBR APOL -B

## 2020-08-04 PROCEDURE — 96413 CHEMO IV INFUSION 1 HR: CPT

## 2020-08-04 RX ORDER — SODIUM CHLORIDE 9 MG/ML
10 INJECTION INTRAMUSCULAR; INTRAVENOUS; SUBCUTANEOUS AS NEEDED
Status: ACTIVE | OUTPATIENT
Start: 2020-08-04 | End: 2020-08-04

## 2020-08-04 RX ORDER — HEPARIN 100 UNIT/ML
300-500 SYRINGE INTRAVENOUS AS NEEDED
Status: ACTIVE | OUTPATIENT
Start: 2020-08-04 | End: 2020-08-04

## 2020-08-04 RX ORDER — SODIUM CHLORIDE 0.9 % (FLUSH) 0.9 %
10 SYRINGE (ML) INJECTION AS NEEDED
Status: DISPENSED | OUTPATIENT
Start: 2020-08-04 | End: 2020-08-04

## 2020-08-04 RX ORDER — SODIUM CHLORIDE 9 MG/ML
25 INJECTION, SOLUTION INTRAVENOUS CONTINUOUS
Status: DISPENSED | OUTPATIENT
Start: 2020-08-04 | End: 2020-08-04

## 2020-08-04 RX ADMIN — SODIUM CHLORIDE 25 ML/HR: 900 INJECTION, SOLUTION INTRAVENOUS at 10:02

## 2020-08-04 RX ADMIN — Medication 500 UNITS: at 10:45

## 2020-08-04 RX ADMIN — SODIUM CHLORIDE 10 ML: 9 INJECTION INTRAMUSCULAR; INTRAVENOUS; SUBCUTANEOUS at 10:45

## 2020-08-04 RX ADMIN — TRASTUZUMAB 630 MG: 150 INJECTION, POWDER, LYOPHILIZED, FOR SOLUTION INTRAVENOUS at 10:06

## 2020-08-04 NOTE — PROGRESS NOTES
[facility] Progress Note    Date: 2020    Name: Latoya Fine    MRN: [id number]         : 1950    Ms. See Villa Arrived ambulatory and in no distress for C6D1 of Herceptin Regimen. Assessment was completed, no acute issues at this time, no new complaints voiced. Right chest wall port accessed without difficulty, labs drawn & sent for processing. Chemotherapy Flowsheet 2020   Cycle C6D1   Date 2020   Drug / Regimen Herceptin   Post Hydration -   Pre Meds -   Notes -         Ms. Barrios's vitals were reviewed. Visit Vitals  /69   Pulse 86   Temp 98.8 °F (37.1 °C)   Resp 16   Ht 5' 7\" (1.702 m)   Wt 103.1 kg (227 lb 6.4 oz)   SpO2 98%   BMI 35.62 kg/m²       Medications:  Medications Administered     0.9% sodium chloride infusion     Admin Date  2020 Action  New Bag Dose  25 mL/hr Rate  25 mL/hr Route  IntraVENous Administered By  Freya Zaldivar RN          0.9% sodium chloride injection 10 mL     Admin Date  2020 Action  Given Dose  10 mL Route  IntraVENous Administered By  Freya Zaldivar RN          heparin (porcine) pf 300-500 Units     Admin Date  2020 Action  Given Dose  500 Units Route  InterCATHeter Administered By  Freya Zaldivar RN          trastuzumab (HERCEPTIN) 630 mg in 0.9% sodium chloride 250 mL, overfill volume 25 mL IVPB     Admin Date  2020 Action  New Bag Dose  630 mg Rate  610 mL/hr Route  IntraVENous Administered By  Freya Zaldivar RN                  Ms. See Villa tolerated treatment well and was discharged from [place] [address] in stable condition. Port de-accessed, flushed & heparinized per protocol. She is to return on 20 for her next appointment.     Diana Baumann RN  2020

## 2020-08-18 RX ORDER — ACETAMINOPHEN 325 MG/1
650 TABLET ORAL
Status: CANCELLED | OUTPATIENT
Start: 2020-08-25

## 2020-08-18 RX ORDER — DIPHENHYDRAMINE HYDROCHLORIDE 50 MG/ML
50 INJECTION, SOLUTION INTRAMUSCULAR; INTRAVENOUS
Status: CANCELLED | OUTPATIENT
Start: 2020-08-25

## 2020-08-18 RX ORDER — HYDROCORTISONE SODIUM SUCCINATE 100 MG/2ML
100 INJECTION, POWDER, FOR SOLUTION INTRAMUSCULAR; INTRAVENOUS AS NEEDED
Status: CANCELLED | OUTPATIENT
Start: 2020-08-25

## 2020-08-18 RX ORDER — ALBUTEROL SULFATE 0.83 MG/ML
2.5 SOLUTION RESPIRATORY (INHALATION) AS NEEDED
Status: CANCELLED
Start: 2020-08-25

## 2020-08-18 RX ORDER — DIPHENHYDRAMINE HYDROCHLORIDE 50 MG/ML
25 INJECTION, SOLUTION INTRAMUSCULAR; INTRAVENOUS AS NEEDED
Status: CANCELLED
Start: 2020-08-25

## 2020-08-18 RX ORDER — EPINEPHRINE 1 MG/ML
0.3 INJECTION, SOLUTION, CONCENTRATE INTRAVENOUS AS NEEDED
Status: CANCELLED | OUTPATIENT
Start: 2020-08-25

## 2020-08-18 RX ORDER — DIPHENHYDRAMINE HYDROCHLORIDE 50 MG/ML
50 INJECTION, SOLUTION INTRAMUSCULAR; INTRAVENOUS AS NEEDED
Status: CANCELLED
Start: 2020-08-25

## 2020-08-18 RX ORDER — ONDANSETRON 2 MG/ML
8 INJECTION INTRAMUSCULAR; INTRAVENOUS AS NEEDED
Status: CANCELLED | OUTPATIENT
Start: 2020-08-25

## 2020-08-18 RX ORDER — ACETAMINOPHEN 325 MG/1
650 TABLET ORAL AS NEEDED
Status: CANCELLED
Start: 2020-08-25

## 2020-08-25 ENCOUNTER — OFFICE VISIT (OUTPATIENT)
Dept: ONCOLOGY | Age: 70
End: 2020-08-25
Payer: MEDICARE

## 2020-08-25 ENCOUNTER — HOSPITAL ENCOUNTER (OUTPATIENT)
Dept: INFUSION THERAPY | Age: 70
Discharge: HOME | End: 2020-08-25
Payer: MEDICARE

## 2020-08-25 VITALS
RESPIRATION RATE: 18 BRPM | HEART RATE: 82 BPM | TEMPERATURE: 97.8 F | SYSTOLIC BLOOD PRESSURE: 172 MMHG | HEIGHT: 67 IN | BODY MASS INDEX: 36.57 KG/M2 | DIASTOLIC BLOOD PRESSURE: 84 MMHG | OXYGEN SATURATION: 97 % | WEIGHT: 233 LBS

## 2020-08-25 VITALS
OXYGEN SATURATION: 97 % | RESPIRATION RATE: 15 BRPM | HEIGHT: 67 IN | WEIGHT: 233 LBS | BODY MASS INDEX: 36.57 KG/M2 | TEMPERATURE: 97.8 F | DIASTOLIC BLOOD PRESSURE: 65 MMHG | HEART RATE: 77 BPM | SYSTOLIC BLOOD PRESSURE: 139 MMHG

## 2020-08-25 DIAGNOSIS — Z17.0 BILATERAL MALIGNANT NEOPLASM OF BREAST IN FEMALE, ESTROGEN RECEPTOR POSITIVE, UNSPECIFIED SITE OF BREAST (HCC): Primary | ICD-10-CM

## 2020-08-25 DIAGNOSIS — Z51.11 CHEMOTHERAPY MANAGEMENT, ENCOUNTER FOR: ICD-10-CM

## 2020-08-25 DIAGNOSIS — C50.912 BILATERAL MALIGNANT NEOPLASM OF BREAST IN FEMALE, ESTROGEN RECEPTOR POSITIVE, UNSPECIFIED SITE OF BREAST (HCC): Primary | ICD-10-CM

## 2020-08-25 DIAGNOSIS — C50.911 BILATERAL MALIGNANT NEOPLASM OF BREAST IN FEMALE, ESTROGEN RECEPTOR POSITIVE, UNSPECIFIED SITE OF BREAST (HCC): Primary | ICD-10-CM

## 2020-08-25 DIAGNOSIS — Z51.81 ENCOUNTER FOR MONITORING CARDIOTOXIC DRUG THERAPY: ICD-10-CM

## 2020-08-25 DIAGNOSIS — G62.9 NEUROPATHY: ICD-10-CM

## 2020-08-25 DIAGNOSIS — Z79.899 ENCOUNTER FOR MONITORING CARDIOTOXIC DRUG THERAPY: ICD-10-CM

## 2020-08-25 LAB
ANION GAP SERPL CALC-SCNC: 5 MMOL/L (ref 5–15)
BUN SERPL-MCNC: 9 MG/DL (ref 6–20)
BUN/CREAT SERPL: 9 (ref 12–20)
CALCIUM SERPL-MCNC: 9.5 MG/DL (ref 8.5–10.1)
CHLORIDE SERPL-SCNC: 106 MMOL/L (ref 97–108)
CO2 SERPL-SCNC: 28 MMOL/L (ref 21–32)
CREAT SERPL-MCNC: 1.04 MG/DL (ref 0.55–1.02)
GLUCOSE SERPL-MCNC: 150 MG/DL (ref 65–100)
POTASSIUM SERPL-SCNC: 3.9 MMOL/L (ref 3.5–5.1)
SODIUM SERPL-SCNC: 139 MMOL/L (ref 136–145)

## 2020-08-25 PROCEDURE — 99214 OFFICE O/P EST MOD 30 MIN: CPT | Performed by: INTERNAL MEDICINE

## 2020-08-25 PROCEDURE — 74011250636 HC RX REV CODE- 250/636: Performed by: INTERNAL MEDICINE

## 2020-08-25 PROCEDURE — G8417 CALC BMI ABV UP PARAM F/U: HCPCS | Performed by: INTERNAL MEDICINE

## 2020-08-25 PROCEDURE — 3017F COLORECTAL CA SCREEN DOC REV: CPT | Performed by: INTERNAL MEDICINE

## 2020-08-25 PROCEDURE — G8400 PT W/DXA NO RESULTS DOC: HCPCS | Performed by: INTERNAL MEDICINE

## 2020-08-25 PROCEDURE — 36415 COLL VENOUS BLD VENIPUNCTURE: CPT

## 2020-08-25 PROCEDURE — 1090F PRES/ABSN URINE INCON ASSESS: CPT | Performed by: INTERNAL MEDICINE

## 2020-08-25 PROCEDURE — 74011000258 HC RX REV CODE- 258: Performed by: INTERNAL MEDICINE

## 2020-08-25 PROCEDURE — G8427 DOCREV CUR MEDS BY ELIG CLIN: HCPCS | Performed by: INTERNAL MEDICINE

## 2020-08-25 PROCEDURE — G8432 DEP SCR NOT DOC, RNG: HCPCS | Performed by: INTERNAL MEDICINE

## 2020-08-25 PROCEDURE — 77030016057 HC NDL HUBR APOL -B

## 2020-08-25 PROCEDURE — 96413 CHEMO IV INFUSION 1 HR: CPT

## 2020-08-25 PROCEDURE — G0463 HOSPITAL OUTPT CLINIC VISIT: HCPCS | Performed by: INTERNAL MEDICINE

## 2020-08-25 PROCEDURE — G8536 NO DOC ELDER MAL SCRN: HCPCS | Performed by: INTERNAL MEDICINE

## 2020-08-25 PROCEDURE — 1101F PT FALLS ASSESS-DOCD LE1/YR: CPT | Performed by: INTERNAL MEDICINE

## 2020-08-25 PROCEDURE — 74011000250 HC RX REV CODE- 250: Performed by: INTERNAL MEDICINE

## 2020-08-25 PROCEDURE — 80048 BASIC METABOLIC PNL TOTAL CA: CPT

## 2020-08-25 RX ORDER — SODIUM CHLORIDE 9 MG/ML
25 INJECTION, SOLUTION INTRAVENOUS CONTINUOUS
Status: DISPENSED | OUTPATIENT
Start: 2020-08-25 | End: 2020-08-25

## 2020-08-25 RX ORDER — SODIUM CHLORIDE 0.9 % (FLUSH) 0.9 %
10 SYRINGE (ML) INJECTION AS NEEDED
Status: DISPENSED | OUTPATIENT
Start: 2020-08-25 | End: 2020-08-25

## 2020-08-25 RX ORDER — METFORMIN HYDROCHLORIDE 500 MG/1
TABLET ORAL 2 TIMES DAILY WITH MEALS
COMMUNITY

## 2020-08-25 RX ORDER — ANASTROZOLE 1 MG/1
1 TABLET ORAL DAILY
Qty: 90 TAB | Refills: 2 | Status: SHIPPED | OUTPATIENT
Start: 2020-08-25 | End: 2021-05-20

## 2020-08-25 RX ORDER — HEPARIN 100 UNIT/ML
300-500 SYRINGE INTRAVENOUS AS NEEDED
Status: ACTIVE | OUTPATIENT
Start: 2020-08-25 | End: 2020-08-25

## 2020-08-25 RX ORDER — SODIUM CHLORIDE 9 MG/ML
10 INJECTION INTRAMUSCULAR; INTRAVENOUS; SUBCUTANEOUS AS NEEDED
Status: ACTIVE | OUTPATIENT
Start: 2020-08-25 | End: 2020-08-25

## 2020-08-25 RX ADMIN — Medication 500 UNITS: at 11:50

## 2020-08-25 RX ADMIN — SODIUM CHLORIDE 25 ML/HR: 900 INJECTION, SOLUTION INTRAVENOUS at 11:10

## 2020-08-25 RX ADMIN — Medication 10 ML: at 09:14

## 2020-08-25 RX ADMIN — SODIUM CHLORIDE 10 ML: 9 INJECTION, SOLUTION INTRAMUSCULAR; INTRAVENOUS; SUBCUTANEOUS at 09:13

## 2020-08-25 RX ADMIN — TRASTUZUMAB 630 MG: 150 INJECTION, POWDER, LYOPHILIZED, FOR SOLUTION INTRAVENOUS at 11:15

## 2020-08-25 RX ADMIN — Medication 10 ML: at 11:50

## 2020-08-25 NOTE — PROGRESS NOTES
[facility] Progress Note    Date: 2020    Name: Alexander Woods    MRN: [id number]         : 1950    Ms. Chaim Mas Arrived ambulatory and in no distress for C7D1 of Herceptin Regimen. Assessment was completed, no acute issues at this time, no new complaints voiced. Right chest wall port accessed without difficulty, labs drawn & sent for processing. Patient proceed to appointment with Dr. Valdez [facility]. Ms. Terell Leo vitals were reviewed. Visit Vitals  /84   Pulse 82   Temp 97.8 °F (36.6 °C)   Resp 18   Ht 5' 7\" (1.702 m)   Wt 105.7 kg (233 lb)   SpO2 97%   Breastfeeding No   BMI 36.49 kg/m²     Echo done 20. EF 63%. BMP added on and drawn from port and pending. Medications:  Herceptin IV    Ms. Barrios tolerated treatment well and was discharged from [place] [address] in stable condition. Port de-accessed, flushed & heparinized per protocol. She is to return on 9/15/20 for her next appointment.     Talmadge Harada, RN  2020

## 2020-08-25 NOTE — PROGRESS NOTES
[facility] [place] [address] [address] [facility]  Lorie Bras: [phone]  F: [phone]      Reason for Visit:   Yara Hernández is a 71 y.o. female who is seen in consultation at the request of Dr. Tayler Hill for evaluation of therapy for breast cancer. Rad onc:  Dr. Betsy Eisenmenger    Treatment History:   · 1/24/20 right breast mass:  9:00 core bx, IMC, gr 1, 5 mm, ER + at 99%, SC + at 97%, HER 2 negative at IHC 0, ki67 8%; left breast mass: 3:00 core bx, IMC, gr 3,  5 mm, ER + at 98%, SC negative, HER 2 POSITIVE at [facility] 3+  · invitae genetic testing:  BRCA2 and RAD50 VUS  · 3/23/20 right breast lumpectomy:  ILC, 7 mm, gr 1, no LVI, 0/8 LN, pT1b pN0 cMo; left breast lumpectomy:  IDC, 1.1 cm, gr 3, 0/2 LN, DCIS present, no LVI, pT1c pN0 cM0  · Paclitaxel/trastuzumab 4/21/20-7/7/2020  · Outback Herceptin 7/14/2020-  · XRT 8/5/2020-    History of Present Illness: An abnormal mammogram led to the pathology above    Interval history:  In today for follow up and treatment. Complains of gr 2 hot flashes, gr 1 neuropathy, gr 1 swelling. FH:  No breast or ovarian cancer, no prostate or pancreas cancer    Past Medical History:   Diagnosis Date    Cancer [facility])       History reviewed. No pertinent surgical history. Social History     Tobacco Use    Smoking status: Never Smoker    Smokeless tobacco: Never Used   Substance Use Topics    Alcohol use: Not Currently      History reviewed. No pertinent family history. Current Outpatient Medications   Medication Sig    metFORMIN (GLUCOPHAGE) 500 mg tablet Take  by mouth two (2) times daily (with meals).  anastrozole (ARIMIDEX) 1 mg tablet Take 1 mg by mouth daily.     ergocalciferol (ERGOCALCIFEROL) 1,250 mcg (50,000 unit) capsule TK 1 C PO 1 TIME A WK    losartan (COZAAR) 50 mg tablet TK 1 T PO QD    omeprazole (PRILOSEC) 20 mg capsule TK 1 C PO QD    rosuvastatin (CRESTOR) 20 mg tablet TK 1 T PO QD    aspirin delayed-release 81 mg tablet Take  by mouth daily.  lidocaine-prilocaine (EMLA) topical cream Apply  to affected area as needed for Pain.  prochlorperazine (COMPAZINE) 10 mg tablet Take 1 Tab by mouth every six (6) hours as needed for Nausea.  ondansetron hcl (ZOFRAN) 8 mg tablet Take 1 Tab by mouth every eight (8) hours as needed for Nausea. No current facility-administered medications for this visit. Facility-Administered Medications Ordered in Other Visits   Medication Dose Route Frequency    saline peripheral flush soln 10 mL  10 mL InterCATHeter PRN    0.9% sodium chloride injection 10 mL  10 mL IntraVENous PRN    heparin (porcine) pf 300-500 Units  300-500 Units InterCATHeter PRN      No Known Allergies     Review of Systems: A complete review of systems was obtained, negative except as described above. Physical Exam:     Visit Vitals  /84 (BP 1 Location: Left arm, BP Patient Position: Sitting) Comment: I rechecked BP and it was[de-identified] 143/74 and Pulse[de-identified] 85.   Pulse 82   Temp 97.8 °F (36.6 °C) (Temporal)   Resp 18   Ht 5' 7\" (1.702 m)   Wt 233 lb (105.7 kg)   SpO2 97%   BMI 36.49 kg/m²     General: No distress  Eyes: Anicteric sclerae  HENT: Atraumatic  Neck: Supple  Respiratory: Normal respiratory effort  CV: No peripheral edema  GI: nondistended  Skin: No rashes, ecchymoses, or petechiae  Psych: Alert, oriented, appropriate affect, normal judgment/insight      Results:     Lab Results   Component Value Date/Time    WBC 5.0 07/07/2020 10:16 AM    HGB 11.3 (L) 07/07/2020 10:16 AM    HCT 32.6 (L) 07/07/2020 10:16 AM    PLATELET 486 61/12/5517 10:16 AM    MCV 90.8 07/07/2020 10:16 AM    ABS.  NEUTROPHILS 2.7 07/07/2020 10:16 AM     Lab Results   Component Value Date/Time    Sodium 139 07/14/2020 10:28 AM    Potassium 3.6 07/14/2020 10:28 AM    Chloride 108 07/14/2020 10:28 AM    CO2 25 07/14/2020 10:28 AM    Glucose 205 (H) 07/14/2020 10:28 AM    BUN 12 07/14/2020 10:28 AM    Creatinine 1.06 (H) 07/14/2020 10:28 AM    GFR est AA >60 07/14/2020 10:28 AM    GFR est non-AA 51 (L) 07/14/2020 10:28 AM    Calcium 8.5 07/14/2020 10:28 AM     Lab Results   Component Value Date/Time    Bilirubin, total 0.6 06/23/2020 09:48 AM    ALT (SGPT) 32 06/23/2020 09:48 AM    Alk. phosphatase 90 06/23/2020 09:48 AM    Protein, total 6.9 06/23/2020 09:48 AM    Albumin 3.3 (L) 06/23/2020 09:48 AM    Globulin 3.6 06/23/2020 09:48 AM       2/11/20 MRI breast  Left:  11 mm mass at 3:00  Right:  9:00, 5 mm mass  No LAD    4/20/20 TTE EF 63%    Records reviewed and summarized above. Pathology report(s) reviewed above. Radiology report(s) reviewed above. Assessment/plan:   1. Left breast IDC, 1.1 cm, 0/2 LN, ER +, MA negative, HER 2 POSITIVE, gr 3:  Stage IA (both anatomic and prognostic)    We explained to the patient that the goal of systemic adjuvant therapy is to improve the chances for cure and decrease the risk of relapse. We explained why a patient can have microscopic cancer spread now even though physical examination, laboratory studies and imaging studies are negative for cancer. We explained that the same treatments used now as adjuvant or preventive treatments rarely if ever are curative in women who develop metastases. Using eprognosis. org, her 5 year all cause mortality risk is only 6-8%; her 10 year all cause mortality risk is 15-23%; her median OS is 17-21 years. An adjuvant conversation is warranted. Discussed weekly paclitaxel 80 mg/m2 with weekly trastuzumab 4 mg/kg load then 2 mg/kg weekly x 12 followed by completion of a year of trastuzumab 6 mg/kg q 3 weeks. This is APT study. I discussed the potential risks of paclitaxel chemotherapy with the patient. Major toxicities include nausea and vomiting, stomatitis, fatigue. Anemia frequently results. Neutropenic fever is uncommon, but can be a life-threatening problem.  We provided the patient with detailed information concerning toxicity including frequent toxicities that only last a few days, such as nausea, vomiting, mouth sores, arthralgia, myalgia, and potentially allergic reactions to paclitaxel, as well as toxicities which can be longer lasting including fatigue, anemia and neuropathy. We provided the patient with detailed information concerning the toxicities of their regimen in addition to our verbal discussion. Rationale for therapy with trastuzumab was also discussed with the patient including a 50% proportional improvement in disease free survival and also an improvement in overall survival in patients receiving trastuzumab and chemotherapy for HER-2 positive breast cancer. The side effects of trastuzumab were discussed including a 4%-5% risk of dropping her ejection fraction while on treatment and about a 1% risk of CHF. We discussed that this drug will be used every 3 weeks for remainder of a year following the chemotherapy cycles. We will check her EF before chemotherapy and every 3 months while she is receiving trastuzumab. After this discussion, she is agreeable to paclitaxel and trastuzumab as above. She has signed informed consent. TTE on 4/20/2020, EF 63%, TTE on 7/13/2020, 63%, 7& decline in GLS, will monitor with next TTE, due 10/5/2020, ordered. Outback Herceptin 3/13 today. We will see her back in 6 weeks for #6. Dr. Neftali Roca has placed port    rx for zofran, compazine, emla cream    Following chemotherapy, she is an excellent candidate for radiation and endocrine therapy. Started XRT on 8/5/2020 with Dr. Sobia Farley. The risks and benefits of aromatase inhibitors (anastrozole, letrozole, and exemestane) were discussed in detail and the patient was informed of the following: Risks include the development of painful muscles and joints (arthralgia/myalgia) and bone loss.  Muscle and joint pain can be severe but rarely result in any tissue damage; symptoms usually resolve in several weeks when the medication is stopped. Bone loss is common and a bone density test is recommended as a baseline and then yearly to every several years depending on initial results. The risk of fractures is increased by a few percent in patients taking these drugs, but careful monitoring of bone density and using bone protecting agents when indicated can minimize these risks. Unlike tamoxifen there is no increased risk of blood clots or endometrial cancer. AIs can cause or worsen vaginal dryness but women using these drugs should not use vaginal estrogen preparations for these symptoms. AIs can also cause or increase hot flashes. Any other symptoms should be reported. After this discussion, patient is agreeable to Anastrozole 1 mg daily, rx in. She will start this 1 week after completing XRT. She will check with Dr. Shad Ramírez to see when her last dexa was. 2. Right breast ILC, 7 mm, ER +, IN +, HER 2 negative, gr 1, 0/8 LN, stage IA both anatomic and prognostic    We explained to the patient that the goal of systemic adjuvant therapy is to improve the chances for cure and decrease the risk of relapse. We explained why a patient can have microscopic cancer spread now even though physical examination, laboratory studies and imaging studies are negative for cancer. We explained that the same treatments used now as adjuvant or preventive treatments rarely if ever are curative in women who develop metastases. Recommend endocrine therapy, see #1 above. While she does not quite meet the criteria on this side for CALGB 9343 (76 yo instead of 70), she does meet KIT guidelines for possibly avoiding XRT on this side, though would defer to Dr. Jaime Car since XRT is indicated on the Left due to HER2 + disease. 3. Emotional well being:  She has excellent support and is coping well with her disease    4. BRCA2 and RAD50 VUS:  Not clinically significant at this time    5. Neck itching:  resolved    6. Obesity:  Will monitor following chemo    7. Increased cr: Will monitor. Will check BMP today. 8. 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 Discussed starting Cymbalta, she declines at this time, will notify us if this worsens. I appreciate the opportunity to participate in Ms. Emma Reyes silverio. Signed By: Levy Skiff, MD      No orders of the defined types were placed in this encounter.

## 2020-08-25 NOTE — PROGRESS NOTES
Balaji Thomas is a 71 y.o. female Follow up for the Evaluation of Breast Cancer. 1. Have you been to the ER, urgent care clinic since your last visit? Hospitalized since your last visit? No    2. Have you seen or consulted any other health care providers outside of the [address] [place] [address] since your last visit? Include any pap smears or colon screening.  No

## 2020-08-25 NOTE — PATIENT INSTRUCTIONS
Anastrozole (By mouth) Anastrozole (an-AS-troe-zole) Treats breast cancer. Brand Name(s): Arimidex There may be other brand names for this medicine. When This Medicine Should Not Be Used: This medicine is not right for everyone. Do not use it if you had an allergic reaction to anastrozole, if you are pregnant, or if you have not stopped menstruating (premenopausal). How to Use This Medicine:  
Tablet · Medicines used to treat cancer are very strong and can have many side effects. Before receiving this medicine, make sure you understand all the risks and benefits. It is important for you to work closely with your doctor during your treatment. · Your doctor will tell you how much medicine to use. Do not use more than directed. · Read and follow the patient instructions that come with this medicine. Talk to your doctor or pharmacist if you have any questions. · Missed dose: Take a dose as soon as you remember. If it is almost time for your next dose, wait until then and take a regular dose. Do not take extra medicine to make up for a missed dose. · If you vomit after taking your medicine, call your doctor or pharmacist for instructions. · Store the medicine in a closed container at room temperature, away from heat, moisture, and direct light. · Ask your pharmacist, doctor, or health caregiver about the best way to dispose of any leftover medicine after you have finished your treatment. You will also need to throw away old medicine after the expiration date has passed. Drugs and Foods to Avoid: Ask your doctor or pharmacist before using any other medicine, including over-the-counter medicines, vitamins, and herbal products. · Some medicines can affect how anastrozole works. Tell your doctor if you are taking any of the following: ¨ Medicine that contains estrogen ¨ Tamoxifen Warnings While Using This Medicine: · Pregnancy after menopause is not likely, but if you think you could be pregnant, tell your doctor. This medicine could harm an unborn baby. · Tell your doctor if you are breastfeeding, or if you have liver disease, bone problems (such as osteoporosis), high cholesterol, or heart disease. · This medicine may cause the following problems:  
¨ Increased risk for weak bones or osteoporosis ¨ Increased cholesterol levels ¨ Increased risk for heart attack or stroke · Your doctor will do lab tests at regular visits to check on the effects of this medicine. Keep all appointments. · Cancer medicine can cause nausea or vomiting, sometimes even after you receive medicine to prevent these effects. Ask your doctor or nurse about other ways to control any nausea or vomiting that might happen. · Keep all medicine out of the reach of children. Never share your medicine with anyone. Possible Side Effects While Using This Medicine:  
Call your doctor right away if you notice any of these side effects: · Allergic reaction: Itching or hives, swelling in your face or hands, swelling or tingling in your mouth or throat, chest tightness, trouble breathing · Back, bone, joint, or muscle pain · Blistering, peeling, or red skin rash · Chest pain or shortness of breath · Fever, chills, cough, sore throat, and body aches · Numbness, tingling, or burning pain in your hands, arms, legs, or feet · Rapid weight gain, or swelling in your hands, ankles, or feet · Severe diarrhea, nausea, or vomiting · Vaginal bleeding or discharge · Yellowing of your skin or the whites of your eyes If you notice these less serious side effects, talk with your doctor: · Breast pain · Dizziness, headache, tiredness, or weakness · Mood changes, anxiety, or irritability · Trouble sleeping · Warmth or redness in your face, neck, arms, or upper chest 
If you notice other side effects that you think are caused by this medicine, tell your doctor. Call your doctor for medical advice about side effects.  You may report side effects to FDA at 0-749-FDA-0553 © 2017 [address] Information is for End User's use only and may not be sold, redistributed or otherwise used for commercial purposes. The above information is an  only. It is not intended as medical advice for individual conditions or treatments. Talk to your doctor, nurse or pharmacist before following any medical regimen to see if it is safe and effective for you.

## 2020-09-01 ENCOUNTER — TELEPHONE (OUTPATIENT)
Dept: ONCOLOGY | Age: 70
End: 2020-09-01

## 2020-09-01 NOTE — TELEPHONE ENCOUNTER
9/1/2020 5:08 PM: Called patient and advised that she has been scheduled for an echocardiogram on 9/9 at 1 PM at [address] at [facility]. Patient verbalized understanding and denied questions or concerns.

## 2020-09-09 ENCOUNTER — ANCILLARY PROCEDURE (OUTPATIENT)
Dept: CARDIOLOGY CLINIC | Age: 70
End: 2020-09-09
Payer: MEDICARE

## 2020-09-09 VITALS
BODY MASS INDEX: 36.57 KG/M2 | SYSTOLIC BLOOD PRESSURE: 138 MMHG | HEIGHT: 67 IN | WEIGHT: 233 LBS | DIASTOLIC BLOOD PRESSURE: 78 MMHG

## 2020-09-09 DIAGNOSIS — Z17.0 BILATERAL MALIGNANT NEOPLASM OF BREAST IN FEMALE, ESTROGEN RECEPTOR POSITIVE, UNSPECIFIED SITE OF BREAST (HCC): ICD-10-CM

## 2020-09-09 DIAGNOSIS — Z51.81 ENCOUNTER FOR MONITORING CARDIOTOXIC DRUG THERAPY: ICD-10-CM

## 2020-09-09 DIAGNOSIS — C50.911 BILATERAL MALIGNANT NEOPLASM OF BREAST IN FEMALE, ESTROGEN RECEPTOR POSITIVE, UNSPECIFIED SITE OF BREAST (HCC): ICD-10-CM

## 2020-09-09 DIAGNOSIS — C50.912 BILATERAL MALIGNANT NEOPLASM OF BREAST IN FEMALE, ESTROGEN RECEPTOR POSITIVE, UNSPECIFIED SITE OF BREAST (HCC): ICD-10-CM

## 2020-09-09 DIAGNOSIS — Z79.899 ENCOUNTER FOR MONITORING CARDIOTOXIC DRUG THERAPY: ICD-10-CM

## 2020-09-09 PROCEDURE — 93306 TTE W/DOPPLER COMPLETE: CPT | Performed by: INTERNAL MEDICINE

## 2020-09-09 RX ORDER — ACETAMINOPHEN 325 MG/1
650 TABLET ORAL AS NEEDED
Status: CANCELLED
Start: 2020-09-15

## 2020-09-09 RX ORDER — ALBUTEROL SULFATE 0.83 MG/ML
2.5 SOLUTION RESPIRATORY (INHALATION) AS NEEDED
Status: CANCELLED
Start: 2020-09-15

## 2020-09-09 RX ORDER — ONDANSETRON 2 MG/ML
8 INJECTION INTRAMUSCULAR; INTRAVENOUS AS NEEDED
Status: CANCELLED | OUTPATIENT
Start: 2020-09-15

## 2020-09-09 RX ORDER — ACETAMINOPHEN 325 MG/1
650 TABLET ORAL
Status: CANCELLED | OUTPATIENT
Start: 2020-09-15

## 2020-09-09 RX ORDER — DIPHENHYDRAMINE HYDROCHLORIDE 50 MG/ML
50 INJECTION, SOLUTION INTRAMUSCULAR; INTRAVENOUS
Status: CANCELLED | OUTPATIENT
Start: 2020-09-15

## 2020-09-09 RX ORDER — EPINEPHRINE 1 MG/ML
0.3 INJECTION, SOLUTION, CONCENTRATE INTRAVENOUS AS NEEDED
Status: CANCELLED | OUTPATIENT
Start: 2020-09-15

## 2020-09-09 RX ORDER — HYDROCORTISONE SODIUM SUCCINATE 100 MG/2ML
100 INJECTION, POWDER, FOR SOLUTION INTRAMUSCULAR; INTRAVENOUS AS NEEDED
Status: CANCELLED | OUTPATIENT
Start: 2020-09-15

## 2020-09-09 RX ORDER — DIPHENHYDRAMINE HYDROCHLORIDE 50 MG/ML
50 INJECTION, SOLUTION INTRAMUSCULAR; INTRAVENOUS AS NEEDED
Status: CANCELLED
Start: 2020-09-15

## 2020-09-09 RX ORDER — DIPHENHYDRAMINE HYDROCHLORIDE 50 MG/ML
25 INJECTION, SOLUTION INTRAMUSCULAR; INTRAVENOUS AS NEEDED
Status: CANCELLED
Start: 2020-09-15

## 2020-09-10 LAB
ECHO AO ASC DIAM: 3.01 CM
ECHO AO ROOT DIAM: 3.2 CM
ECHO AV AREA PEAK VELOCITY: 2.1 CM2
ECHO AV AREA VTI: 2.17 CM2
ECHO AV AREA/BSA PEAK VELOCITY: 1 CM2/M2
ECHO AV AREA/BSA VTI: 1 CM2/M2
ECHO AV MEAN GRADIENT: 9.53 MMHG
ECHO AV PEAK GRADIENT: 20.39 MMHG
ECHO AV PEAK VELOCITY: 225.78 CM/S
ECHO AV VTI: 39.85 CM
ECHO LA AREA 4C: 20.38 CM2
ECHO LA MAJOR AXIS: 3.85 CM
ECHO LA MINOR AXIS: 1.78 CM
ECHO LA VOL 2C: 65.11 ML (ref 22–52)
ECHO LA VOL 4C: 58.57 ML (ref 22–52)
ECHO LA VOL BP: 67.84 ML (ref 22–52)
ECHO LA VOL/BSA BIPLANE: 31.44 ML/M2 (ref 16–28)
ECHO LA VOLUME INDEX A2C: 30.18 ML/M2 (ref 16–28)
ECHO LA VOLUME INDEX A4C: 27.15 ML/M2 (ref 16–28)
ECHO LV E' LATERAL VELOCITY: 13.04 CM/S
ECHO LV E' SEPTAL VELOCITY: 6.99 CM/S
ECHO LV EDV A2C: 100.25 ML
ECHO LV EDV A4C: 97.31 ML
ECHO LV EDV BP: 99.89 ML (ref 56–104)
ECHO LV EDV INDEX A4C: 45.1 ML/M2
ECHO LV EDV INDEX BP: 46.3 ML/M2
ECHO LV EDV NDEX A2C: 46.5 ML/M2
ECHO LV EJECTION FRACTION A2C: 62 PERCENT
ECHO LV EJECTION FRACTION A4C: 59 PERCENT
ECHO LV EJECTION FRACTION BIPLANE: 60.9 PERCENT (ref 55–100)
ECHO LV ESV A2C: 38.32 ML
ECHO LV ESV A4C: 39.81 ML
ECHO LV ESV BP: 39.05 ML (ref 19–49)
ECHO LV ESV INDEX A2C: 17.8 ML/M2
ECHO LV ESV INDEX A4C: 18.5 ML/M2
ECHO LV ESV INDEX BP: 18.1 ML/M2
ECHO LV GLOBAL LONGITUDINAL STRAIN (GLS): 17.9 PERCENT
ECHO LV INTERNAL DIMENSION DIASTOLIC: 3.39 CM (ref 3.9–5.3)
ECHO LV INTERNAL DIMENSION SYSTOLIC: 2.34 CM
ECHO LV IVSD: 1.1 CM (ref 0.6–0.9)
ECHO LV MASS 2D: 112 G (ref 67–162)
ECHO LV MASS INDEX 2D: 51.9 G/M2 (ref 43–95)
ECHO LV POSTERIOR WALL DIASTOLIC: 1.08 CM (ref 0.6–0.9)
ECHO LVOT DIAM: 2.05 CM
ECHO LVOT PEAK GRADIENT: 8.2 MMHG
ECHO LVOT PEAK VELOCITY: 143.21 CM/S
ECHO LVOT SV: 86.6 ML
ECHO LVOT VTI: 26.13 CM
ECHO MV A VELOCITY: 89.92 CM/S
ECHO MV E DECELERATION TIME (DT): 0.19 S
ECHO MV E VELOCITY: 70.72 CM/S
ECHO MV E/A RATIO: 0.79
ECHO MV E/E' LATERAL: 5.42
ECHO MV E/E' RATIO (AVERAGED): 7.77
ECHO MV E/E' SEPTAL: 10.12
ECHO MV MAX VELOCITY: 92.2 CM/S
ECHO MV MEAN GRADIENT: 1.26 MMHG
ECHO MV PEAK GRADIENT: 3.4 MMHG
ECHO MV PRESSURE HALF TIME (PHT): 60 S
ECHO MV VTI: 19.05 CM
ECHO RA AREA 4C: 14.97 CM2
ECHO RV INTERNAL DIMENSION: 3.49 CM
ECHO RV TAPSE: 1.87 CM (ref 1.5–2)
ECHO TV REGURGITANT MAX VELOCITY: 256.98 CM/S
ECHO TV REGURGITANT PEAK GRADIENT: 26.42 MMHG
GLOBAL LONGITUDINAL STRAIN 2 CHAMBER: 18.5 PERCENT
GLOBAL LONGITUDINAL STRAIN 4 CHAMBER: 18 PERCENT
GLOBAL LONGITUDINAL STRAIN LONG AXIS: 17.1 PERCENT

## 2020-09-15 ENCOUNTER — OFFICE VISIT (OUTPATIENT)
Dept: ONCOLOGY | Age: 70
End: 2020-09-15
Payer: MEDICARE

## 2020-09-15 ENCOUNTER — HOSPITAL ENCOUNTER (OUTPATIENT)
Dept: INFUSION THERAPY | Age: 70
Discharge: HOME | End: 2020-09-15
Payer: MEDICARE

## 2020-09-15 VITALS
SYSTOLIC BLOOD PRESSURE: 149 MMHG | RESPIRATION RATE: 16 BRPM | HEART RATE: 74 BPM | DIASTOLIC BLOOD PRESSURE: 79 MMHG | OXYGEN SATURATION: 96 % | TEMPERATURE: 98.9 F

## 2020-09-15 VITALS
TEMPERATURE: 98.9 F | RESPIRATION RATE: 16 BRPM | OXYGEN SATURATION: 96 % | SYSTOLIC BLOOD PRESSURE: 153 MMHG | HEART RATE: 80 BPM | HEIGHT: 67 IN | WEIGHT: 233 LBS | BODY MASS INDEX: 36.57 KG/M2 | DIASTOLIC BLOOD PRESSURE: 82 MMHG

## 2020-09-15 DIAGNOSIS — Z51.81 ENCOUNTER FOR MONITORING CARDIOTOXIC DRUG THERAPY: ICD-10-CM

## 2020-09-15 DIAGNOSIS — C50.911 BILATERAL MALIGNANT NEOPLASM OF BREAST IN FEMALE, ESTROGEN RECEPTOR POSITIVE, UNSPECIFIED SITE OF BREAST (HCC): Primary | ICD-10-CM

## 2020-09-15 DIAGNOSIS — Z17.0 BILATERAL MALIGNANT NEOPLASM OF BREAST IN FEMALE, ESTROGEN RECEPTOR POSITIVE, UNSPECIFIED SITE OF BREAST (HCC): Primary | ICD-10-CM

## 2020-09-15 DIAGNOSIS — C50.912 BILATERAL MALIGNANT NEOPLASM OF BREAST IN FEMALE, ESTROGEN RECEPTOR POSITIVE, UNSPECIFIED SITE OF BREAST (HCC): Primary | ICD-10-CM

## 2020-09-15 DIAGNOSIS — Z79.899 ENCOUNTER FOR MONITORING CARDIOTOXIC DRUG THERAPY: ICD-10-CM

## 2020-09-15 LAB
BNP SERPL-MCNC: 39 PG/ML
TROPONIN I SERPL-MCNC: <0.05 NG/ML

## 2020-09-15 PROCEDURE — G8432 DEP SCR NOT DOC, RNG: HCPCS | Performed by: INTERNAL MEDICINE

## 2020-09-15 PROCEDURE — G8536 NO DOC ELDER MAL SCRN: HCPCS | Performed by: INTERNAL MEDICINE

## 2020-09-15 PROCEDURE — 74011250636 HC RX REV CODE- 250/636: Performed by: INTERNAL MEDICINE

## 2020-09-15 PROCEDURE — 36415 COLL VENOUS BLD VENIPUNCTURE: CPT

## 2020-09-15 PROCEDURE — G8400 PT W/DXA NO RESULTS DOC: HCPCS | Performed by: INTERNAL MEDICINE

## 2020-09-15 PROCEDURE — 77030016057 HC NDL HUBR APOL -B

## 2020-09-15 PROCEDURE — 84484 ASSAY OF TROPONIN QUANT: CPT

## 2020-09-15 PROCEDURE — G8427 DOCREV CUR MEDS BY ELIG CLIN: HCPCS | Performed by: INTERNAL MEDICINE

## 2020-09-15 PROCEDURE — 83880 ASSAY OF NATRIURETIC PEPTIDE: CPT

## 2020-09-15 PROCEDURE — 74011000258 HC RX REV CODE- 258: Performed by: INTERNAL MEDICINE

## 2020-09-15 PROCEDURE — 96413 CHEMO IV INFUSION 1 HR: CPT

## 2020-09-15 PROCEDURE — 1101F PT FALLS ASSESS-DOCD LE1/YR: CPT | Performed by: INTERNAL MEDICINE

## 2020-09-15 PROCEDURE — 1090F PRES/ABSN URINE INCON ASSESS: CPT | Performed by: INTERNAL MEDICINE

## 2020-09-15 PROCEDURE — 99214 OFFICE O/P EST MOD 30 MIN: CPT | Performed by: INTERNAL MEDICINE

## 2020-09-15 PROCEDURE — G8417 CALC BMI ABV UP PARAM F/U: HCPCS | Performed by: INTERNAL MEDICINE

## 2020-09-15 PROCEDURE — 3017F COLORECTAL CA SCREEN DOC REV: CPT | Performed by: INTERNAL MEDICINE

## 2020-09-15 RX ORDER — SODIUM CHLORIDE 9 MG/ML
25 INJECTION, SOLUTION INTRAVENOUS CONTINUOUS
Status: DISPENSED | OUTPATIENT
Start: 2020-09-15 | End: 2020-09-15

## 2020-09-15 RX ORDER — SODIUM CHLORIDE 9 MG/ML
10 INJECTION INTRAMUSCULAR; INTRAVENOUS; SUBCUTANEOUS AS NEEDED
Status: ACTIVE | OUTPATIENT
Start: 2020-09-15 | End: 2020-09-15

## 2020-09-15 RX ORDER — SODIUM CHLORIDE 0.9 % (FLUSH) 0.9 %
10 SYRINGE (ML) INJECTION AS NEEDED
Status: DISPENSED | OUTPATIENT
Start: 2020-09-15 | End: 2020-09-15

## 2020-09-15 RX ORDER — HEPARIN 100 UNIT/ML
300-500 SYRINGE INTRAVENOUS AS NEEDED
Status: ACTIVE | OUTPATIENT
Start: 2020-09-15 | End: 2020-09-15

## 2020-09-15 RX ORDER — CARVEDILOL 3.12 MG/1
3.12 TABLET ORAL 2 TIMES DAILY WITH MEALS
Qty: 60 TAB | Refills: 2 | Status: SHIPPED | OUTPATIENT
Start: 2020-09-15 | End: 2020-11-02 | Stop reason: SDUPTHER

## 2020-09-15 RX ADMIN — TRASTUZUMAB 630 MG: 150 INJECTION, POWDER, LYOPHILIZED, FOR SOLUTION INTRAVENOUS at 11:07

## 2020-09-15 RX ADMIN — Medication 10 ML: at 11:40

## 2020-09-15 RX ADMIN — Medication 10 ML: at 09:05

## 2020-09-15 RX ADMIN — SODIUM CHLORIDE 25 ML/HR: 900 INJECTION, SOLUTION INTRAVENOUS at 11:01

## 2020-09-15 RX ADMIN — SODIUM CHLORIDE 10 ML: 9 INJECTION INTRAMUSCULAR; INTRAVENOUS; SUBCUTANEOUS at 09:05

## 2020-09-15 RX ADMIN — HEPARIN 500 UNITS: 100 SYRINGE at 11:40

## 2020-09-15 NOTE — PROGRESS NOTES
Carlos Curran is a 71 y.o. female Follow up for the Evaluation of Breast Cancer. 1. Have you been to the ER, urgent care clinic since your last visit? Hospitalized since your last visit? No    2. Have you seen or consulted any other health care providers outside of the [address] [place] [address] since your last visit? Include any pap smears or colon screening.  No

## 2020-09-15 NOTE — PROGRESS NOTES
[facility] [place] [facility] [place] [address]  [address] [facility], [address] [facility] [address] [facility]  Ray Nixer: [phone]  F: [phone]      Reason for Visit:   Balaji Thomas is a 71 y.o. female who is seen in consultation at the request of Dr. Jose Patrick for evaluation of therapy for breast cancer. Rad onc:  Dr. Jen Miller    Treatment History:   · 1/24/20 right breast mass:  9:00 core bx, IMC, gr 1, 5 mm, ER + at 99%, AR + at 97%, HER 2 negative at IHC 0, ki67 8%; left breast mass: 3:00 core bx, IMC, gr 3,  5 mm, ER + at 98%, AR negative, HER 2 POSITIVE at [facility] 3+  · invitae genetic testing:  BRCA2 and RAD50 VUS  · 3/23/20 right breast lumpectomy:  ILC, 7 mm, gr 1, no LVI, 0/8 LN, pT1b pN0 cMo; left breast lumpectomy:  IDC, 1.1 cm, gr 3, 0/2 LN, DCIS present, no LVI, pT1c pN0 cM0  · Paclitaxel/trastuzumab 4/21/20-7/7/2020  · Outback Herceptin 7/14/2020-  · XRT to left breast 8/5/2020-9/8/2020  · Anastrozole 9/16/2020    History of Present Illness: An abnormal mammogram led to the pathology above    Interval history:  In today for follow up and treatment. Complains of gr 1 hot flashes. FH:  No breast or ovarian cancer, no prostate or pancreas cancer    Past Medical History:   Diagnosis Date    Cancer [facility])       History reviewed. No pertinent surgical history. Social History     Tobacco Use    Smoking status: Never Smoker    Smokeless tobacco: Never Used   Substance Use Topics    Alcohol use: Not Currently      History reviewed. No pertinent family history. Current Outpatient Medications   Medication Sig    carvediloL (COREG) 3.125 mg tablet Take 1 Tab by mouth two (2) times daily (with meals).  metFORMIN (GLUCOPHAGE) 500 mg tablet Take  by mouth two (2) times daily (with meals).  anastrozole (ARIMIDEX) 1 mg tablet Take 1 mg by mouth daily.     ergocalciferol (ERGOCALCIFEROL) 1,250 mcg (50,000 unit) capsule TK 1 C PO 1 TIME A WK    losartan (COZAAR) 50 mg tablet TK 1 T PO QD    omeprazole (PRILOSEC) 20 mg capsule TK 1 C PO QD    rosuvastatin (CRESTOR) 20 mg tablet TK 1 T PO QD    aspirin delayed-release 81 mg tablet Take  by mouth daily.  lidocaine-prilocaine (EMLA) topical cream Apply  to affected area as needed for Pain. No current facility-administered medications for this visit. Facility-Administered Medications Ordered in Other Visits   Medication Dose Route Frequency    saline peripheral flush soln 10 mL  10 mL InterCATHeter PRN    0.9% sodium chloride injection 10 mL  10 mL IntraVENous PRN    heparin (porcine) pf 300-500 Units  300-500 Units InterCATHeter PRN      No Known Allergies     Review of Systems: A complete review of systems was obtained, negative except as described above. Physical Exam:     Visit Vitals  BP (!) 153/82 (BP 1 Location: Left arm, BP Patient Position: Sitting) Comment: I rechecked the BP and it was[de-identified] 156/81 and Pulse--81. Pulse 80   Temp 98.9 °F (37.2 °C) (Temporal)   Resp 16   Ht 5' 7\" (1.702 m)   Wt 233 lb (105.7 kg)   SpO2 96%   BMI 36.49 kg/m²     General: No distress  Eyes: Anicteric sclerae  HENT: Atraumatic  Neck: Supple  Respiratory: Normal respiratory effort  CV: No peripheral edema  GI: nondistended  Skin: No rashes, ecchymoses, or petechiae  Psych: Alert, oriented, appropriate affect, normal judgment/insight      Results:     Lab Results   Component Value Date/Time    WBC 5.0 07/07/2020 10:16 AM    HGB 11.3 (L) 07/07/2020 10:16 AM    HCT 32.6 (L) 07/07/2020 10:16 AM    PLATELET 976 91/59/5670 10:16 AM    MCV 90.8 07/07/2020 10:16 AM    ABS.  NEUTROPHILS 2.7 07/07/2020 10:16 AM     Lab Results   Component Value Date/Time    Sodium 139 08/25/2020 10:25 AM    Potassium 3.9 08/25/2020 10:25 AM    Chloride 106 08/25/2020 10:25 AM    CO2 28 08/25/2020 10:25 AM    Glucose 150 (H) 08/25/2020 10:25 AM    BUN 9 08/25/2020 10:25 AM    Creatinine 1.04 (H) 08/25/2020 10:25 AM    GFR est AA >60 08/25/2020 10:25 AM    GFR est non-AA 53 (L) 08/25/2020 10:25 AM    Calcium 9.5 08/25/2020 10:25 AM     Lab Results   Component Value Date/Time    Bilirubin, total 0.6 06/23/2020 09:48 AM    ALT (SGPT) 32 06/23/2020 09:48 AM    Alk. phosphatase 90 06/23/2020 09:48 AM    Protein, total 6.9 06/23/2020 09:48 AM    Albumin 3.3 (L) 06/23/2020 09:48 AM    Globulin 3.6 06/23/2020 09:48 AM       2/11/20 MRI breast  Left:  11 mm mass at 3:00  Right:  9:00, 5 mm mass  No LAD    4/20/20 TTE EF 63%    Records reviewed and summarized above. Pathology report(s) reviewed above. Radiology report(s) reviewed above. Assessment/plan:   1. Left breast IDC, 1.1 cm, 0/2 LN, ER +, DE negative, HER 2 POSITIVE, gr 3:  Stage IA (both anatomic and prognostic)    We explained to the patient that the goal of systemic adjuvant therapy is to improve the chances for cure and decrease the risk of relapse. We explained why a patient can have microscopic cancer spread now even though physical examination, laboratory studies and imaging studies are negative for cancer. We explained that the same treatments used now as adjuvant or preventive treatments rarely if ever are curative in women who develop metastases. Using eprognosis. org, her 5 year all cause mortality risk is only 6-8%; her 10 year all cause mortality risk is 15-23%; her median OS is 17-21 years. An adjuvant conversation is warranted. Discussed weekly paclitaxel 80 mg/m2 with weekly trastuzumab 4 mg/kg load then 2 mg/kg weekly x 12 followed by completion of a year of trastuzumab 6 mg/kg q 3 weeks. This is APT study. I discussed the potential risks of paclitaxel chemotherapy with the patient. Major toxicities include nausea and vomiting, stomatitis, fatigue. Anemia frequently results. Neutropenic fever is uncommon, but can be a life-threatening problem.  We provided the patient with detailed information concerning toxicity including frequent toxicities that only last a few days, such as nausea, vomiting, mouth sores, arthralgia, myalgia, and potentially allergic reactions to paclitaxel, as well as toxicities which can be longer lasting including fatigue, anemia and neuropathy. We provided the patient with detailed information concerning the toxicities of their regimen in addition to our verbal discussion. Rationale for therapy with trastuzumab was also discussed with the patient including a 50% proportional improvement in disease free survival and also an improvement in overall survival in patients receiving trastuzumab and chemotherapy for HER-2 positive breast cancer. The side effects of trastuzumab were discussed including a 4%-5% risk of dropping her ejection fraction while on treatment and about a 1% risk of CHF. We discussed that this drug will be used every 3 weeks for remainder of a year following the chemotherapy cycles. We will check her EF before chemotherapy and every 3 months while she is receiving trastuzumab. After this discussion, she is agreeable to paclitaxel and trastuzumab as above. She has signed informed consent. TTE on 4/20/2020, EF 63%, TTE on 7/13/2020, 63%, 7% decline in GLS, TTE on 9/9/2020, EF 61% with 7.7% decline in GLS since 7/2020 and 14.7% decline in GLS since 4/2020. Will repeat in 1 month, ordered. Outback Herceptin 4/13 today. We will see her back in 6 weeks for #6. Dr. Kely Dean has placed port    rx for zofran, compazine, emla cream    Following chemotherapy, she is an excellent candidate for radiation and endocrine therapy. Started XRT on 8/5/2020 with Dr. Souleymane Rodriguez and completed XRT on 9/8/2020. The risks and benefits of aromatase inhibitors (anastrozole, letrozole, and exemestane) were discussed in detail and the patient was informed of the following: Risks include the development of painful muscles and joints (arthralgia/myalgia) and bone loss.  Muscle and joint pain can be severe but rarely result in any tissue damage; symptoms usually resolve in several weeks when the medication is stopped. Bone loss is common and a bone density test is recommended as a baseline and then yearly to every several years depending on initial results. The risk of fractures is increased by a few percent in patients taking these drugs, but careful monitoring of bone density and using bone protecting agents when indicated can minimize these risks. Unlike tamoxifen there is no increased risk of blood clots or endometrial cancer. AIs can cause or worsen vaginal dryness but women using these drugs should not use vaginal estrogen preparations for these symptoms. AIs can also cause or increase hot flashes. Any other symptoms should be reported. After this discussion, patient is agreeable to Anastrozole 1 mg daily, rx in. Completed xrt on 9/8/2020, plans on starting anastrozole tomorrow, 9/16/2020. She will check with Dr. Maggi Thompson to see when her last dexa was. This was last week, we will obtain    2. Right breast ILC, 7 mm, ER +, NH +, HER 2 negative, gr 1, 0/8 LN, stage IA both anatomic and prognostic    We explained to the patient that the goal of systemic adjuvant therapy is to improve the chances for cure and decrease the risk of relapse. We explained why a patient can have microscopic cancer spread now even though physical examination, laboratory studies and imaging studies are negative for cancer. We explained that the same treatments used now as adjuvant or preventive treatments rarely if ever are curative in women who develop metastases. Recommend endocrine therapy, see #1 above. While she does not quite meet the criteria on this side for CALGB 9343 (74 yo instead of 70), she does meet KIT guidelines for possibly avoiding XRT on this side, and did so    3. Emotional well being:  She has excellent support and is coping well with her disease    4. BRCA2 and RAD50 VUS:  Not clinically significant at this time    5. Neck itching:  resolved    6. Obesity:  Will monitor following chemo    7. Increased cr: Will monitor. 8. 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 Discussed starting Cymbalta, she declines at this time, will notify us if this worsens. 9. Decrease in GLS:  14.7% decline in GLS since 4/2020. LVEF stable. ProBNP and Trop normal.  Will start Coreg 3.125 BID, rx in. Repeat TTE in 1 month. Discussed with Dr. Louise Newell, ok to continue to resume treatment since troponin and BNP were normal    10. HM:  Getting colonoscopy in Oct.     I appreciate the opportunity to participate in Ms. Givens Shape care. Signed By: Marvia Hammans, MD      No orders of the defined types were placed in this encounter.

## 2020-09-15 NOTE — PROGRESS NOTES
[facility] Progress Note    Date: September 15, 2020    Name: Brandy Leal    MRN: [id number]         : 1950    Ms. Sanju Landeros Arrived ambulatory and in no distress for C8D1 of Herceptin Regimen. Assessment was completed, no acute issues at this time, pt c/o ongoing numbness in upper/lower extremities, fatigue. Right chest wall port accessed without difficulty, labs drawn & sent for processing. ECHO completed  with EF of 61%. Chemotherapy Flowsheet 9/15/2020   Cycle C8D1   Date 9/15/2020   Drug / Regimen Herceptin   Post Hydration -   Pre Meds -   Notes -       0915 Patient proceed to appointment with Dr. Marlo Umanzor. Ms. Karrie Bailey vitals were reviewed. Patient Vitals for the past 12 hrs:   Temp Pulse Resp BP SpO2   09/15/20 1140 -- 74 16 (!) 149/79 --   09/15/20 0905 98.9 °F (37.2 °C) 80 16 (!) 153/82 96 %       Lab results were obtained and reviewed.   Recent Results (from the past 12 hour(s))   TROPONIN I    Collection Time: 09/15/20  9:08 AM   Result Value Ref Range    Troponin-I, Qt. <0.05 <0.05 ng/mL   NT-PRO BNP    Collection Time: 09/15/20  9:08 AM   Result Value Ref Range    NT pro-BNP 39 <125 PG/ML     Medications:  Medications Administered     0.9% sodium chloride infusion     Admin Date  09/15/2020 Action  New Bag Dose  25 mL/hr Rate  25 mL/hr Route  IntraVENous Administered By  Luis Angel Casanova RN          0.9% sodium chloride injection 10 mL     Admin Date  09/15/2020 Action  Given Dose  10 mL Route  IntraVENous Administered By  Luis Angel Casanova RN          heparin (porcine) pf 300-500 Units     Admin Date  09/15/2020 Action  Given Dose  500 Units Route  InterCATHeter Administered By  Luis Angel Casanova RN          saline peripheral flush soln 10 mL     Admin Date  09/15/2020 Action  Given Dose  10 mL Route  InterCATHeter Administered By  Luis Angel Casanova RN           Admin Date  09/15/2020 Action  Given Dose  10 mL Route  InterCATHeter Administered By  Christa Luther Sandra Covington RN          trastuzumab (HERCEPTIN) 630 mg in 0.9% sodium chloride 250 mL, overfill volume 25 mL IVPB     Admin Date  09/15/2020 Action  New Bag Dose  630 mg Rate  610 mL/hr Route  IntraVENous Administered By  Luis Angel Casanova RN                  Ms. Sanju Landeros tolerated treatment well and was discharged from [place] [address] in stable condition at 1140. Port de-accessed, flushed & heparinized per protocol. She is to return on October 6 at 0900 for her next appointment.     Juan Hagan RN  September 15, 2020

## 2020-09-29 RX ORDER — DIPHENHYDRAMINE HYDROCHLORIDE 50 MG/ML
50 INJECTION, SOLUTION INTRAMUSCULAR; INTRAVENOUS AS NEEDED
Status: CANCELLED
Start: 2020-10-06

## 2020-09-29 RX ORDER — ONDANSETRON 2 MG/ML
8 INJECTION INTRAMUSCULAR; INTRAVENOUS AS NEEDED
Status: CANCELLED | OUTPATIENT
Start: 2020-10-06

## 2020-09-29 RX ORDER — ALBUTEROL SULFATE 0.83 MG/ML
2.5 SOLUTION RESPIRATORY (INHALATION) AS NEEDED
Status: CANCELLED
Start: 2020-10-06

## 2020-09-29 RX ORDER — ACETAMINOPHEN 325 MG/1
650 TABLET ORAL
Status: CANCELLED | OUTPATIENT
Start: 2020-10-06

## 2020-09-29 RX ORDER — DIPHENHYDRAMINE HYDROCHLORIDE 50 MG/ML
50 INJECTION, SOLUTION INTRAMUSCULAR; INTRAVENOUS
Status: CANCELLED | OUTPATIENT
Start: 2020-10-06

## 2020-09-29 RX ORDER — EPINEPHRINE 1 MG/ML
0.3 INJECTION, SOLUTION, CONCENTRATE INTRAVENOUS AS NEEDED
Status: CANCELLED | OUTPATIENT
Start: 2020-10-06

## 2020-09-29 RX ORDER — HYDROCORTISONE SODIUM SUCCINATE 100 MG/2ML
100 INJECTION, POWDER, FOR SOLUTION INTRAMUSCULAR; INTRAVENOUS AS NEEDED
Status: CANCELLED | OUTPATIENT
Start: 2020-10-06

## 2020-09-29 RX ORDER — ACETAMINOPHEN 325 MG/1
650 TABLET ORAL AS NEEDED
Status: CANCELLED
Start: 2020-10-06

## 2020-09-29 RX ORDER — DIPHENHYDRAMINE HYDROCHLORIDE 50 MG/ML
25 INJECTION, SOLUTION INTRAMUSCULAR; INTRAVENOUS AS NEEDED
Status: CANCELLED
Start: 2020-10-06

## 2020-10-06 ENCOUNTER — HOSPITAL ENCOUNTER (OUTPATIENT)
Dept: INFUSION THERAPY | Age: 70
Discharge: HOME | End: 2020-10-06
Payer: MEDICARE

## 2020-10-06 VITALS
BODY MASS INDEX: 36.22 KG/M2 | HEART RATE: 73 BPM | WEIGHT: 230.8 LBS | TEMPERATURE: 98.8 F | DIASTOLIC BLOOD PRESSURE: 63 MMHG | HEIGHT: 67 IN | RESPIRATION RATE: 16 BRPM | OXYGEN SATURATION: 99 % | SYSTOLIC BLOOD PRESSURE: 131 MMHG

## 2020-10-06 DIAGNOSIS — C50.912 BILATERAL MALIGNANT NEOPLASM OF BREAST IN FEMALE, ESTROGEN RECEPTOR POSITIVE, UNSPECIFIED SITE OF BREAST (HCC): Primary | ICD-10-CM

## 2020-10-06 DIAGNOSIS — C50.911 BILATERAL MALIGNANT NEOPLASM OF BREAST IN FEMALE, ESTROGEN RECEPTOR POSITIVE, UNSPECIFIED SITE OF BREAST (HCC): Primary | ICD-10-CM

## 2020-10-06 DIAGNOSIS — Z17.0 BILATERAL MALIGNANT NEOPLASM OF BREAST IN FEMALE, ESTROGEN RECEPTOR POSITIVE, UNSPECIFIED SITE OF BREAST (HCC): Primary | ICD-10-CM

## 2020-10-06 PROCEDURE — 96413 CHEMO IV INFUSION 1 HR: CPT

## 2020-10-06 PROCEDURE — 74011250636 HC RX REV CODE- 250/636: Performed by: INTERNAL MEDICINE

## 2020-10-06 PROCEDURE — 74011000258 HC RX REV CODE- 258: Performed by: INTERNAL MEDICINE

## 2020-10-06 RX ORDER — SODIUM CHLORIDE 9 MG/ML
10 INJECTION INTRAMUSCULAR; INTRAVENOUS; SUBCUTANEOUS AS NEEDED
Status: ACTIVE | OUTPATIENT
Start: 2020-10-06 | End: 2020-10-06

## 2020-10-06 RX ORDER — SODIUM CHLORIDE 0.9 % (FLUSH) 0.9 %
10 SYRINGE (ML) INJECTION AS NEEDED
Status: DISPENSED | OUTPATIENT
Start: 2020-10-06 | End: 2020-10-06

## 2020-10-06 RX ORDER — SODIUM CHLORIDE 9 MG/ML
25 INJECTION, SOLUTION INTRAVENOUS CONTINUOUS
Status: DISPENSED | OUTPATIENT
Start: 2020-10-06 | End: 2020-10-06

## 2020-10-06 RX ORDER — HEPARIN 100 UNIT/ML
300-500 SYRINGE INTRAVENOUS AS NEEDED
Status: ACTIVE | OUTPATIENT
Start: 2020-10-06 | End: 2020-10-06

## 2020-10-06 RX ADMIN — TRASTUZUMAB 630 MG: 150 INJECTION, POWDER, LYOPHILIZED, FOR SOLUTION INTRAVENOUS at 10:06

## 2020-10-06 RX ADMIN — Medication 500 UNITS: at 10:41

## 2020-10-06 RX ADMIN — Medication 10 ML: at 10:41

## 2020-10-06 RX ADMIN — SODIUM CHLORIDE 25 ML/HR: 900 INJECTION, SOLUTION INTRAVENOUS at 10:06

## 2020-10-06 NOTE — PROGRESS NOTES
\[facility]\"" Progress Note    Date: 2020    Name: Bee Hsieh    MRN: [id number]         : 1950    Ms. King Lynn Arrived ambulatory and in no distress for C9D1 of Herceptin Regimen. Assessment was completed, no acute issues at this time, no new complaints voiced. R chest wall port accessed without difficulty, positive blood return noted. Chemotherapy Flowsheet 10/6/2020   Cycle C9D1   Date 10/6/2020   Drug / Regimen herceptin   Post Hydration -   Pre Meds -   Notes given         Ms. Barrios's vitals were reviewed. Visit Vitals  /63   Pulse 73   Temp 98.8 °F (37.1 °C)   Resp 16   Ht 5' 7\" (1.702 m)   Wt 104.7 kg (230 lb 12.8 oz)   SpO2 99%   Breastfeeding No   BMI 36.15 kg/m²         Medications:  Medications Administered     0.9% sodium chloride infusion     Admin Date  10/06/2020 Action  New Bag Dose  25 mL/hr Rate  25 mL/hr Route  IntraVENous Administered By  Sil Palmer RN          heparin (porcine) pf 300-500 Units     Admin Date  10/06/2020 Action  Given Dose  500 Units Route  InterCATHeter Administered By  Sil Palmer RN          saline peripheral flush soln 10 mL     Admin Date  10/06/2020 Action  Given Dose  10 mL Route  InterCATHeter Administered By  Sil Palmer RN          trastuzumab (HERCEPTIN) 630 mg in 0.9% sodium chloride 250 mL, overfill volume 25 mL IVPB     Admin Date  10/06/2020 Action  New Bag Dose  630 mg Rate  610 mL/hr Route  IntraVENous Administered By  Sil Palmer RN                  Ms. King Lynn tolerated treatment well and was discharged from [place] [address] in stable condition. Port de-accessed, flushed & heparinized per protocol. She is to return on  at 0900 for her next appointment.     Carlos Rodriguez RN  2020

## 2020-10-16 ENCOUNTER — ANCILLARY PROCEDURE (OUTPATIENT)
Dept: CARDIOLOGY CLINIC | Age: 70
End: 2020-10-16
Payer: MEDICARE

## 2020-10-16 VITALS
SYSTOLIC BLOOD PRESSURE: 131 MMHG | DIASTOLIC BLOOD PRESSURE: 63 MMHG | BODY MASS INDEX: 36.1 KG/M2 | WEIGHT: 230 LBS | HEIGHT: 67 IN

## 2020-10-16 DIAGNOSIS — Z51.81 ENCOUNTER FOR MONITORING CARDIOTOXIC DRUG THERAPY: ICD-10-CM

## 2020-10-16 DIAGNOSIS — C50.911 BILATERAL MALIGNANT NEOPLASM OF BREAST IN FEMALE, ESTROGEN RECEPTOR POSITIVE, UNSPECIFIED SITE OF BREAST (HCC): ICD-10-CM

## 2020-10-16 DIAGNOSIS — C50.912 BILATERAL MALIGNANT NEOPLASM OF BREAST IN FEMALE, ESTROGEN RECEPTOR POSITIVE, UNSPECIFIED SITE OF BREAST (HCC): ICD-10-CM

## 2020-10-16 DIAGNOSIS — Z79.899 ENCOUNTER FOR MONITORING CARDIOTOXIC DRUG THERAPY: ICD-10-CM

## 2020-10-16 DIAGNOSIS — Z17.0 BILATERAL MALIGNANT NEOPLASM OF BREAST IN FEMALE, ESTROGEN RECEPTOR POSITIVE, UNSPECIFIED SITE OF BREAST (HCC): ICD-10-CM

## 2020-10-16 LAB
ECHO AO ASC DIAM: 2.46 CM
ECHO AO ROOT DIAM: 2.68 CM
ECHO AV AREA PEAK VELOCITY: 1.91 CM2
ECHO AV AREA VTI: 2.1 CM2
ECHO AV AREA/BSA PEAK VELOCITY: 0.9 CM2/M2
ECHO AV AREA/BSA VTI: 1 CM2/M2
ECHO AV MEAN GRADIENT: 8 MMHG
ECHO AV PEAK GRADIENT: 14.17 MMHG
ECHO AV PEAK VELOCITY: 188.2 CM/S
ECHO AV VTI: 42.43 CM
ECHO LA AREA 4C: 17.31 CM2
ECHO LA MAJOR AXIS: 3.92 CM
ECHO LA MINOR AXIS: 1.83 CM
ECHO LA VOL 2C: 32.06 ML (ref 22–52)
ECHO LA VOL 4C: 44.68 ML (ref 22–52)
ECHO LA VOL BP: 41.28 ML (ref 22–52)
ECHO LA VOL/BSA BIPLANE: 19.24 ML/M2 (ref 16–28)
ECHO LA VOLUME INDEX A2C: 14.94 ML/M2 (ref 16–28)
ECHO LA VOLUME INDEX A4C: 20.82 ML/M2 (ref 16–28)
ECHO LV E' LATERAL VELOCITY: 12.57 CM/S
ECHO LV E' SEPTAL VELOCITY: 9.56 CM/S
ECHO LV EDV A2C: 98.19 ML
ECHO LV EDV A4C: 90.9 ML
ECHO LV EDV BP: 94.77 ML (ref 56–104)
ECHO LV EDV INDEX A4C: 42.4 ML/M2
ECHO LV EDV INDEX BP: 44.2 ML/M2
ECHO LV EDV NDEX A2C: 45.8 ML/M2
ECHO LV EJECTION FRACTION A2C: 65 PERCENT
ECHO LV EJECTION FRACTION A4C: 60 PERCENT
ECHO LV EJECTION FRACTION BIPLANE: 62.7 PERCENT (ref 55–100)
ECHO LV ESV A2C: 34.31 ML
ECHO LV ESV A4C: 36.32 ML
ECHO LV ESV BP: 35.33 ML (ref 19–49)
ECHO LV ESV INDEX A2C: 16 ML/M2
ECHO LV ESV INDEX A4C: 16.9 ML/M2
ECHO LV ESV INDEX BP: 16.5 ML/M2
ECHO LV GLOBAL LONGITUDINAL STRAIN (GLS): 17.7 PERCENT
ECHO LV INTERNAL DIMENSION DIASTOLIC: 4.5 CM (ref 3.9–5.3)
ECHO LV INTERNAL DIMENSION SYSTOLIC: 3.37 CM
ECHO LV IVSD: 0.98 CM (ref 0.6–0.9)
ECHO LV MASS 2D: 151.2 G (ref 67–162)
ECHO LV MASS INDEX 2D: 70.4 G/M2 (ref 43–95)
ECHO LV POSTERIOR WALL DIASTOLIC: 1 CM (ref 0.6–0.9)
ECHO LVOT DIAM: 2.01 CM
ECHO LVOT PEAK GRADIENT: 5.01 MMHG
ECHO LVOT PEAK VELOCITY: 111.88 CM/S
ECHO LVOT SV: 75.2 ML
ECHO LVOT VTI: 23.6 CM
ECHO MV A VELOCITY: 87.3 CM/S
ECHO MV AREA PHT: 4.37 CM2
ECHO MV E DECELERATION TIME (DT): 0.17 S
ECHO MV E VELOCITY: 108.16 CM/S
ECHO MV E/A RATIO: 1.24
ECHO MV E/E' LATERAL: 8.6
ECHO MV E/E' RATIO (AVERAGED): 9.96
ECHO MV E/E' SEPTAL: 11.31
ECHO MV PRESSURE HALF TIME (PHT): 0.05 S
ECHO PV PEAK INSTANTANEOUS GRADIENT SYSTOLIC: 7.81 MMHG
ECHO PV REGURGITANT MAX VELOCITY: 139.7 CM/S
ECHO RA AREA 4C: 12.02 CM2
ECHO RA MAJOR AXIS: 3.13 CM
ECHO RV INTERNAL DIMENSION: 2.49 CM
ECHO RV TAPSE: 2.02 CM (ref 1.5–2)
ECHO TV REGURGITANT MAX VELOCITY: 249.33 CM/S
ECHO TV REGURGITANT PEAK GRADIENT: 24.87 MMHG
GLOBAL LONGITUDINAL STRAIN 2 CHAMBER: 17.2 PERCENT
GLOBAL LONGITUDINAL STRAIN 4 CHAMBER: 16.6 PERCENT
GLOBAL LONGITUDINAL STRAIN LONG AXIS: 19.4 PERCENT
LA VOL DISK BP: 38.27 ML (ref 22–52)

## 2020-10-16 PROCEDURE — 93306 TTE W/DOPPLER COMPLETE: CPT | Performed by: INTERNAL MEDICINE

## 2020-10-21 RX ORDER — EPINEPHRINE 1 MG/ML
0.3 INJECTION, SOLUTION, CONCENTRATE INTRAVENOUS AS NEEDED
Status: CANCELLED | OUTPATIENT
Start: 2020-10-27

## 2020-10-21 RX ORDER — ACETAMINOPHEN 325 MG/1
650 TABLET ORAL
Status: CANCELLED | OUTPATIENT
Start: 2020-10-27

## 2020-10-21 RX ORDER — DIPHENHYDRAMINE HYDROCHLORIDE 50 MG/ML
50 INJECTION, SOLUTION INTRAMUSCULAR; INTRAVENOUS
Status: CANCELLED | OUTPATIENT
Start: 2020-10-27

## 2020-10-21 RX ORDER — HYDROCORTISONE SODIUM SUCCINATE 100 MG/2ML
100 INJECTION, POWDER, FOR SOLUTION INTRAMUSCULAR; INTRAVENOUS AS NEEDED
Status: CANCELLED | OUTPATIENT
Start: 2020-10-27

## 2020-10-21 RX ORDER — ACETAMINOPHEN 325 MG/1
650 TABLET ORAL AS NEEDED
Status: CANCELLED
Start: 2020-10-27

## 2020-10-21 RX ORDER — DIPHENHYDRAMINE HYDROCHLORIDE 50 MG/ML
25 INJECTION, SOLUTION INTRAMUSCULAR; INTRAVENOUS AS NEEDED
Status: CANCELLED
Start: 2020-10-27

## 2020-10-21 RX ORDER — ONDANSETRON 2 MG/ML
8 INJECTION INTRAMUSCULAR; INTRAVENOUS AS NEEDED
Status: CANCELLED | OUTPATIENT
Start: 2020-10-27

## 2020-10-21 RX ORDER — DIPHENHYDRAMINE HYDROCHLORIDE 50 MG/ML
50 INJECTION, SOLUTION INTRAMUSCULAR; INTRAVENOUS AS NEEDED
Status: CANCELLED
Start: 2020-10-27

## 2020-10-21 RX ORDER — ALBUTEROL SULFATE 0.83 MG/ML
2.5 SOLUTION RESPIRATORY (INHALATION) AS NEEDED
Status: CANCELLED
Start: 2020-10-27

## 2020-10-27 ENCOUNTER — OFFICE VISIT (OUTPATIENT)
Dept: ONCOLOGY | Age: 70
End: 2020-10-27
Payer: MEDICARE

## 2020-10-27 ENCOUNTER — HOSPITAL ENCOUNTER (OUTPATIENT)
Dept: INFUSION THERAPY | Age: 70
Discharge: HOME | End: 2020-10-27
Payer: MEDICARE

## 2020-10-27 VITALS
DIASTOLIC BLOOD PRESSURE: 75 MMHG | BODY MASS INDEX: 36.87 KG/M2 | RESPIRATION RATE: 16 BRPM | HEART RATE: 70 BPM | HEIGHT: 67 IN | WEIGHT: 234.9 LBS | TEMPERATURE: 98 F | OXYGEN SATURATION: 99 % | SYSTOLIC BLOOD PRESSURE: 149 MMHG

## 2020-10-27 VITALS
WEIGHT: 234 LBS | OXYGEN SATURATION: 98 % | DIASTOLIC BLOOD PRESSURE: 63 MMHG | RESPIRATION RATE: 16 BRPM | BODY MASS INDEX: 36.73 KG/M2 | HEIGHT: 67 IN | SYSTOLIC BLOOD PRESSURE: 142 MMHG | TEMPERATURE: 98.4 F | HEART RATE: 79 BPM

## 2020-10-27 DIAGNOSIS — C50.912 BILATERAL MALIGNANT NEOPLASM OF BREAST IN FEMALE, ESTROGEN RECEPTOR POSITIVE, UNSPECIFIED SITE OF BREAST (HCC): Primary | ICD-10-CM

## 2020-10-27 DIAGNOSIS — R79.89 ELEVATED SERUM CREATININE: ICD-10-CM

## 2020-10-27 DIAGNOSIS — Z51.11 CHEMOTHERAPY MANAGEMENT, ENCOUNTER FOR: ICD-10-CM

## 2020-10-27 DIAGNOSIS — G62.9 NEUROPATHY: ICD-10-CM

## 2020-10-27 DIAGNOSIS — Z51.81 ENCOUNTER FOR MONITORING CARDIOTOXIC DRUG THERAPY: ICD-10-CM

## 2020-10-27 DIAGNOSIS — Z17.0 BILATERAL MALIGNANT NEOPLASM OF BREAST IN FEMALE, ESTROGEN RECEPTOR POSITIVE, UNSPECIFIED SITE OF BREAST (HCC): Primary | ICD-10-CM

## 2020-10-27 DIAGNOSIS — C50.911 BILATERAL MALIGNANT NEOPLASM OF BREAST IN FEMALE, ESTROGEN RECEPTOR POSITIVE, UNSPECIFIED SITE OF BREAST (HCC): Primary | ICD-10-CM

## 2020-10-27 DIAGNOSIS — Z79.899 ENCOUNTER FOR MONITORING CARDIOTOXIC DRUG THERAPY: ICD-10-CM

## 2020-10-27 PROCEDURE — G8417 CALC BMI ABV UP PARAM F/U: HCPCS | Performed by: INTERNAL MEDICINE

## 2020-10-27 PROCEDURE — G0463 HOSPITAL OUTPT CLINIC VISIT: HCPCS | Performed by: INTERNAL MEDICINE

## 2020-10-27 PROCEDURE — 3017F COLORECTAL CA SCREEN DOC REV: CPT | Performed by: INTERNAL MEDICINE

## 2020-10-27 PROCEDURE — 1101F PT FALLS ASSESS-DOCD LE1/YR: CPT | Performed by: INTERNAL MEDICINE

## 2020-10-27 PROCEDURE — 77030012965 HC NDL HUBR BBMI -A

## 2020-10-27 PROCEDURE — 96413 CHEMO IV INFUSION 1 HR: CPT

## 2020-10-27 PROCEDURE — G8432 DEP SCR NOT DOC, RNG: HCPCS | Performed by: INTERNAL MEDICINE

## 2020-10-27 PROCEDURE — 1090F PRES/ABSN URINE INCON ASSESS: CPT | Performed by: INTERNAL MEDICINE

## 2020-10-27 PROCEDURE — 74011250636 HC RX REV CODE- 250/636: Performed by: INTERNAL MEDICINE

## 2020-10-27 PROCEDURE — G8536 NO DOC ELDER MAL SCRN: HCPCS | Performed by: INTERNAL MEDICINE

## 2020-10-27 PROCEDURE — 99214 OFFICE O/P EST MOD 30 MIN: CPT | Performed by: INTERNAL MEDICINE

## 2020-10-27 PROCEDURE — G8400 PT W/DXA NO RESULTS DOC: HCPCS | Performed by: INTERNAL MEDICINE

## 2020-10-27 PROCEDURE — G8427 DOCREV CUR MEDS BY ELIG CLIN: HCPCS | Performed by: INTERNAL MEDICINE

## 2020-10-27 RX ORDER — SODIUM CHLORIDE 0.9 % (FLUSH) 0.9 %
10 SYRINGE (ML) INJECTION AS NEEDED
Status: DISPENSED | OUTPATIENT
Start: 2020-10-27 | End: 2020-10-27

## 2020-10-27 RX ORDER — HEPARIN 100 UNIT/ML
300-500 SYRINGE INTRAVENOUS AS NEEDED
Status: ACTIVE | OUTPATIENT
Start: 2020-10-27 | End: 2020-10-27

## 2020-10-27 RX ORDER — SODIUM CHLORIDE 9 MG/ML
25 INJECTION, SOLUTION INTRAVENOUS CONTINUOUS
Status: DISPENSED | OUTPATIENT
Start: 2020-10-27 | End: 2020-10-27

## 2020-10-27 RX ORDER — SODIUM CHLORIDE 9 MG/ML
10 INJECTION INTRAMUSCULAR; INTRAVENOUS; SUBCUTANEOUS AS NEEDED
Status: ACTIVE | OUTPATIENT
Start: 2020-10-27 | End: 2020-10-27

## 2020-10-27 RX ADMIN — TRASTUZUMAB 630 MG: 150 INJECTION, POWDER, LYOPHILIZED, FOR SOLUTION INTRAVENOUS at 11:30

## 2020-10-27 RX ADMIN — Medication 500 UNITS: at 12:08

## 2020-10-27 RX ADMIN — SODIUM CHLORIDE 25 ML/HR: 900 INJECTION, SOLUTION INTRAVENOUS at 11:22

## 2020-10-27 RX ADMIN — Medication 10 ML: at 12:08

## 2020-10-27 NOTE — PROGRESS NOTES
Akira Jimenez is a 71 y.o. female Follow up for the Evaluation of Breast Cancer. 1. Have you been to the ER, urgent care clinic since your last visit? Hospitalized since your last visit? No    2. Have you seen or consulted any other health care providers outside of the [address] [place] [address] since your last visit? Include any pap smears or colon screening.  No

## 2020-10-27 NOTE — PROGRESS NOTES
[facility] [place] [facility] [place] [address]  [address] [facility], [address] [facility] 1007 [facility]  Emma [place]: [phone]  F: [phone]      Reason for Visit:   Leana Denny is a 71 y.o. female who is seen in consultation at the request of Dr. Shayy Cormier for evaluation of therapy for breast cancer. Rad onc:  Dr. Herminia Ramos    Treatment History:   · 1/24/20 right breast mass:  9:00 core bx, IMC, gr 1, 5 mm, ER + at 99%, MS + at 97%, HER 2 negative at IHC 0, ki67 8%; left breast mass: 3:00 core bx, IMC, gr 3,  5 mm, ER + at 98%, MS negative, HER 2 POSITIVE at [facility] 3+  · invitae genetic testing:  BRCA2 and RAD50 VUS  · 3/23/20 right breast lumpectomy:  ILC, 7 mm, gr 1, no LVI, 0/8 LN, pT1b pN0 cMo; left breast lumpectomy:  IDC, 1.1 cm, gr 3, 0/2 LN, DCIS present, no LVI, pT1c pN0 cM0  · Paclitaxel/trastuzumab 4/21/20-7/7/2020  · Outback Herceptin 7/14/2020-  · XRT to left breast 8/5/2020-9/8/2020  · Anastrozole 9/16/2020    History of Present Illness: An abnormal mammogram led to the pathology above    Interval history:  In today for follow up and treatment. Complains of gr 1 hot flashes, gr 1 neuropathy. FH:  No breast or ovarian cancer, no prostate or pancreas cancer    Past Medical History:   Diagnosis Date    Cancer [facility])       History reviewed. No pertinent surgical history. Social History     Tobacco Use    Smoking status: Never Smoker    Smokeless tobacco: Never Used   Substance Use Topics    Alcohol use: Not Currently      History reviewed. No pertinent family history. Current Outpatient Medications   Medication Sig    carvediloL (COREG) 3.125 mg tablet Take 1 Tab by mouth two (2) times daily (with meals).  metFORMIN (GLUCOPHAGE) 500 mg tablet Take  by mouth two (2) times daily (with meals).  anastrozole (ARIMIDEX) 1 mg tablet Take 1 mg by mouth daily.     ergocalciferol (ERGOCALCIFEROL) 1,250 mcg (50,000 unit) capsule TK 1 C PO 1 TIME A WK    losartan (COZAAR) 50 mg tablet TK 1 T PO QD  omeprazole (PRILOSEC) 20 mg capsule TK 1 C PO QD    rosuvastatin (CRESTOR) 20 mg tablet TK 1 T PO QD    aspirin delayed-release 81 mg tablet Take  by mouth daily.  lidocaine-prilocaine (EMLA) topical cream Apply  to affected area as needed for Pain. No current facility-administered medications for this visit. No Known Allergies     Review of Systems: A complete review of systems was obtained, negative except as described above. Physical Exam:     Visit Vitals  BP (!) 142/63 (BP 1 Location: Left arm, BP Patient Position: Sitting)   Pulse 79   Temp 98.4 °F (36.9 °C) (Temporal)   Resp 16   Ht 5' 7\" (1.702 m)   Wt 234 lb (106.1 kg)   SpO2 98%   BMI 36.65 kg/m²     General: No distress  Eyes: Anicteric sclerae  HENT: Atraumatic  Neck: Supple  Respiratory: Normal respiratory effort  CV: No peripheral edema  GI: nondistended  Skin: No rashes, ecchymoses, or petechiae  Psych: Alert, oriented, appropriate affect, normal judgment/insight      Results:     Lab Results   Component Value Date/Time    WBC 5.0 07/07/2020 10:16 AM    HGB 11.3 (L) 07/07/2020 10:16 AM    HCT 32.6 (L) 07/07/2020 10:16 AM    PLATELET 495 26/01/8657 10:16 AM    MCV 90.8 07/07/2020 10:16 AM    ABS. NEUTROPHILS 2.7 07/07/2020 10:16 AM     Lab Results   Component Value Date/Time    Sodium 139 08/25/2020 10:25 AM    Potassium 3.9 08/25/2020 10:25 AM    Chloride 106 08/25/2020 10:25 AM    CO2 28 08/25/2020 10:25 AM    Glucose 150 (H) 08/25/2020 10:25 AM    BUN 9 08/25/2020 10:25 AM    Creatinine 1.04 (H) 08/25/2020 10:25 AM    GFR est AA >60 08/25/2020 10:25 AM    GFR est non-AA 53 (L) 08/25/2020 10:25 AM    Calcium 9.5 08/25/2020 10:25 AM     Lab Results   Component Value Date/Time    Bilirubin, total 0.6 06/23/2020 09:48 AM    ALT (SGPT) 32 06/23/2020 09:48 AM    Alk.  phosphatase 90 06/23/2020 09:48 AM    Protein, total 6.9 06/23/2020 09:48 AM    Albumin 3.3 (L) 06/23/2020 09:48 AM    Globulin 3.6 06/23/2020 09:48 AM       2/11/20 MRI breast  Left:  11 mm mass at 3:00  Right:  9:00, 5 mm mass  No LAD    4/20/20 TTE EF 63%    9/8/20 dexa  L1-4 T 0.4  L fem neck T 0.3  R fem neck T -0.4    Records reviewed and summarized above. Pathology report(s) reviewed above. Radiology report(s) reviewed above. Assessment/plan:   1. Left breast IDC, 1.1 cm, 0/2 LN, ER +, MS negative, HER 2 POSITIVE, gr 3:  Stage IA (both anatomic and prognostic)    We explained to the patient that the goal of systemic adjuvant therapy is to improve the chances for cure and decrease the risk of relapse. We explained why a patient can have microscopic cancer spread now even though physical examination, laboratory studies and imaging studies are negative for cancer. We explained that the same treatments used now as adjuvant or preventive treatments rarely if ever are curative in women who develop metastases. Using eprognosis. org, her 5 year all cause mortality risk is only 6-8%; her 10 year all cause mortality risk is 15-23%; her median OS is 17-21 years. An adjuvant conversation is warranted. Discussed weekly paclitaxel 80 mg/m2 with weekly trastuzumab 4 mg/kg load then 2 mg/kg weekly x 12 followed by completion of a year of trastuzumab 6 mg/kg q 3 weeks. This is APT study. I discussed the potential risks of paclitaxel chemotherapy with the patient. Major toxicities include nausea and vomiting, stomatitis, fatigue. Anemia frequently results. Neutropenic fever is uncommon, but can be a life-threatening problem. We provided the patient with detailed information concerning toxicity including frequent toxicities that only last a few days, such as nausea, vomiting, mouth sores, arthralgia, myalgia, and potentially allergic reactions to paclitaxel, as well as toxicities which can be longer lasting including fatigue, anemia and neuropathy.  We provided the patient with detailed information concerning the toxicities of their regimen in addition to our verbal discussion. Rationale for therapy with trastuzumab was also discussed with the patient including a 50% proportional improvement in disease free survival and also an improvement in overall survival in patients receiving trastuzumab and chemotherapy for HER-2 positive breast cancer. The side effects of trastuzumab were discussed including a 4%-5% risk of dropping her ejection fraction while on treatment and about a 1% risk of CHF. We discussed that this drug will be used every 3 weeks for remainder of a year following the chemotherapy cycles. We will check her EF before chemotherapy and every 3 months while she is receiving trastuzumab. After this discussion, she is agreeable to paclitaxel and trastuzumab as above. She has signed informed consent. TTE on 4/20/2020, EF 63%, TTE on 7/13/2020, 63%, 7% decline in GLS, TTE on 9/9/2020, EF 61% with 7.7% decline in GLS since 7/2020 and 14.7% decline in GLS since 4/2020. Repeat TTE on 10/16/2020, EF 62%, GLS 17.7%, stable since last TTE. Outback Herceptin 6/13 today. We will see her back in 6 weeks for #8. Dr. Denton Kellogg has placed port    rx for zofran, compazine, emla cream    Following chemotherapy, she is an excellent candidate for radiation and endocrine therapy. Started XRT on 8/5/2020 with Dr. Gómez Devi and completed XRT on 9/8/2020. The risks and benefits of aromatase inhibitors (anastrozole, letrozole, and exemestane) were discussed in detail and the patient was informed of the following: Risks include the development of painful muscles and joints (arthralgia/myalgia) and bone loss. Muscle and joint pain can be severe but rarely result in any tissue damage; symptoms usually resolve in several weeks when the medication is stopped. Bone loss is common and a bone density test is recommended as a baseline and then yearly to every several years depending on initial results.  The risk of fractures is increased by a few percent in patients taking these drugs, but careful monitoring of bone density and using bone protecting agents when indicated can minimize these risks. Unlike tamoxifen there is no increased risk of blood clots or endometrial cancer. AIs can cause or worsen vaginal dryness but women using these drugs should not use vaginal estrogen preparations for these symptoms. AIs can also cause or increase hot flashes. Any other symptoms should be reported. After this discussion, patient is agreeable to Anastrozole 1 mg daily, rx in. Completed xrt on 9/8/2020, started anastrozole on 9/16/2020.        2. Right breast ILC, 7 mm, ER +, LA +, HER 2 negative, gr 1, 0/8 LN, stage IA both anatomic and prognostic    We explained to the patient that the goal of systemic adjuvant therapy is to improve the chances for cure and decrease the risk of relapse. We explained why a patient can have microscopic cancer spread now even though physical examination, laboratory studies and imaging studies are negative for cancer. We explained that the same treatments used now as adjuvant or preventive treatments rarely if ever are curative in women who develop metastases. Recommend endocrine therapy, see #1 above. While she does not quite meet the criteria on this side for CALGB 9343 (76 yo instead of 70), she does meet KIT guidelines for possibly avoiding XRT on this side, and did so    3. Emotional well being:  She has excellent support and is coping well with her disease    4. BRCA2 and RAD50 VUS:  Not clinically significant at this time    5. Neck itching:  resolved    6. Obesity:  Will monitor following chemo    7. Increased cr: Will monitor. 8. 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 Discussed starting Cymbalta, she declines at this time, will notify us if this worsens. 9. Decrease in GLS:  14.7% decline in GLS since 4/2020. LVEF stable. ProBNP and Trop normal.  Will start Coreg 3.125 BID, rx in.  Discussed with Dr. Bridget Caro, ok to continue to resume treatment since troponin and BNP were normal.  Repeat TTE on 10/16/2020 stable. 10.  HM:  Colonoscopy in 10/2020. I appreciate the opportunity to participate in Ms. Jaylin sawyer. Signed By: Jaiden Ferguson MD      No orders of the defined types were placed in this encounter.

## 2020-10-27 NOTE — PROGRESS NOTES
[facility] Progress Note    Date: 2020    Name: Bee Hsieh    MRN: [id number]         : 1950    Ms. King Lynn Arrived ambulatory and in no distress for C10D1 of Herceptin Regimen. Assessment was completed, no acute issues at this time, no new complaints voiced. Right chest wall port accessed without difficulty, labs drawn & sent for processing. Chemotherapy Flowsheet 10/27/2020   Cycle C10D1   Date 10/27/2020   Drug / Regimen Herceptin   Post Hydration -   Pre Meds -   Notes given        Patient proceed to appointment with Dr. Ca Landa. Ms. Anjelica Nogueira vitals were reviewed. Visit Vitals  BP (!) (P) 142/63 (BP 1 Location: Right arm)   Pulse (P) 79   Temp (P) 98.4 °F (36.9 °C)   Resp (P) 16   Ht (P) 5' 7\" (1.702 m)   Wt (P) 106.5 kg (234 lb 14.4 oz)   BMI (P) 36.79 kg/m²       Lab results were obtained and reviewed. No results found for this or any previous visit (from the past 12 hour(s)). Medications:      Ms. King Lynn tolerated treatment well and was discharged from [place] [address] in stable condition. Brady Guanaco   Port de-accessed, flushed & heparinized per protocol. She is to return on  at 0900 for her next appointment.     Timmy Ha RN  2020

## 2020-10-30 NOTE — PROGRESS NOTES
There is gradual decline in GLS. Recent to first GLS comparison show 15% drop. Probably best to start low dose Coreg 6.25 BID just in case. Repeat Echo in 3 months.

## 2020-11-02 RX ORDER — CARVEDILOL 6.25 MG/1
6.25 TABLET ORAL 2 TIMES DAILY WITH MEALS
Qty: 60 TAB | Refills: 3 | Status: SHIPPED | OUTPATIENT
Start: 2020-11-02 | End: 2021-03-01

## 2020-11-03 ENCOUNTER — TELEPHONE (OUTPATIENT)
Dept: ONCOLOGY | Age: 70
End: 2020-11-03

## 2020-11-03 NOTE — TELEPHONE ENCOUNTER
11/3/2020 8:51 AM: Called patient to advise that Dr. Joselito Stewart recommends increasing her Coreg dosage to 6.25 mg twice daily. No answer; left voicemail requesting call back. 11/3/2020 6:25 PM: Called patient. No answer; left voicemail requesting call back. 11/4/2020 6:46 PM: Called patient and advised that Dr. Joselito Stewart increased her Coreg dosage to 6.25 mg and recommends taking this twice daily with meals. Patient verbalized understanding and had no questions or concerns.

## 2020-11-11 RX ORDER — DIPHENHYDRAMINE HYDROCHLORIDE 50 MG/ML
25 INJECTION, SOLUTION INTRAMUSCULAR; INTRAVENOUS AS NEEDED
Status: CANCELLED
Start: 2020-11-17

## 2020-11-11 RX ORDER — DIPHENHYDRAMINE HYDROCHLORIDE 50 MG/ML
50 INJECTION, SOLUTION INTRAMUSCULAR; INTRAVENOUS
Status: CANCELLED | OUTPATIENT
Start: 2020-11-17

## 2020-11-11 RX ORDER — DIPHENHYDRAMINE HYDROCHLORIDE 50 MG/ML
50 INJECTION, SOLUTION INTRAMUSCULAR; INTRAVENOUS AS NEEDED
Status: CANCELLED
Start: 2020-11-17

## 2020-11-11 RX ORDER — HYDROCORTISONE SODIUM SUCCINATE 100 MG/2ML
100 INJECTION, POWDER, FOR SOLUTION INTRAMUSCULAR; INTRAVENOUS AS NEEDED
Status: CANCELLED | OUTPATIENT
Start: 2020-11-17

## 2020-11-11 RX ORDER — ALBUTEROL SULFATE 0.83 MG/ML
2.5 SOLUTION RESPIRATORY (INHALATION) AS NEEDED
Status: CANCELLED
Start: 2020-11-17

## 2020-11-11 RX ORDER — ACETAMINOPHEN 325 MG/1
650 TABLET ORAL
Status: CANCELLED | OUTPATIENT
Start: 2020-11-17

## 2020-11-11 RX ORDER — ONDANSETRON 2 MG/ML
8 INJECTION INTRAMUSCULAR; INTRAVENOUS AS NEEDED
Status: CANCELLED | OUTPATIENT
Start: 2020-11-17

## 2020-11-11 RX ORDER — EPINEPHRINE 1 MG/ML
0.3 INJECTION, SOLUTION, CONCENTRATE INTRAVENOUS AS NEEDED
Status: CANCELLED | OUTPATIENT
Start: 2020-11-17

## 2020-11-11 RX ORDER — ACETAMINOPHEN 325 MG/1
650 TABLET ORAL AS NEEDED
Status: CANCELLED
Start: 2020-11-17

## 2020-11-17 ENCOUNTER — HOSPITAL ENCOUNTER (OUTPATIENT)
Dept: INFUSION THERAPY | Age: 70
Discharge: HOME | End: 2020-11-17
Payer: MEDICARE

## 2020-11-17 VITALS
HEART RATE: 68 BPM | SYSTOLIC BLOOD PRESSURE: 139 MMHG | BODY MASS INDEX: 36.32 KG/M2 | OXYGEN SATURATION: 94 % | RESPIRATION RATE: 18 BRPM | TEMPERATURE: 98.2 F | DIASTOLIC BLOOD PRESSURE: 63 MMHG | WEIGHT: 231.9 LBS

## 2020-11-17 DIAGNOSIS — C50.911 BILATERAL MALIGNANT NEOPLASM OF BREAST IN FEMALE, ESTROGEN RECEPTOR POSITIVE, UNSPECIFIED SITE OF BREAST (HCC): Primary | ICD-10-CM

## 2020-11-17 DIAGNOSIS — C50.912 BILATERAL MALIGNANT NEOPLASM OF BREAST IN FEMALE, ESTROGEN RECEPTOR POSITIVE, UNSPECIFIED SITE OF BREAST (HCC): Primary | ICD-10-CM

## 2020-11-17 DIAGNOSIS — Z17.0 BILATERAL MALIGNANT NEOPLASM OF BREAST IN FEMALE, ESTROGEN RECEPTOR POSITIVE, UNSPECIFIED SITE OF BREAST (HCC): Primary | ICD-10-CM

## 2020-11-17 PROCEDURE — 74011250636 HC RX REV CODE- 250/636: Performed by: INTERNAL MEDICINE

## 2020-11-17 PROCEDURE — 96413 CHEMO IV INFUSION 1 HR: CPT

## 2020-11-17 PROCEDURE — 77030012965 HC NDL HUBR BBMI -A

## 2020-11-17 RX ORDER — HEPARIN 100 UNIT/ML
300-500 SYRINGE INTRAVENOUS AS NEEDED
Status: ACTIVE | OUTPATIENT
Start: 2020-11-17 | End: 2020-11-17

## 2020-11-17 RX ORDER — SODIUM CHLORIDE 9 MG/ML
10 INJECTION INTRAMUSCULAR; INTRAVENOUS; SUBCUTANEOUS AS NEEDED
Status: ACTIVE | OUTPATIENT
Start: 2020-11-17 | End: 2020-11-17

## 2020-11-17 RX ORDER — SODIUM CHLORIDE 0.9 % (FLUSH) 0.9 %
10 SYRINGE (ML) INJECTION AS NEEDED
Status: DISPENSED | OUTPATIENT
Start: 2020-11-17 | End: 2020-11-17

## 2020-11-17 RX ORDER — SODIUM CHLORIDE 9 MG/ML
25 INJECTION, SOLUTION INTRAVENOUS CONTINUOUS
Status: DISPENSED | OUTPATIENT
Start: 2020-11-17 | End: 2020-11-17

## 2020-11-17 RX ADMIN — SODIUM CHLORIDE 10 ML: 9 INJECTION INTRAMUSCULAR; INTRAVENOUS; SUBCUTANEOUS at 09:26

## 2020-11-17 RX ADMIN — Medication 10 ML: at 09:26

## 2020-11-17 RX ADMIN — Medication 10 ML: at 10:54

## 2020-11-17 RX ADMIN — Medication 500 UNITS: at 10:54

## 2020-11-17 RX ADMIN — TRASTUZUMAB 630 MG: 150 INJECTION, POWDER, LYOPHILIZED, FOR SOLUTION INTRAVENOUS at 10:09

## 2020-11-17 RX ADMIN — SODIUM CHLORIDE 25 ML/HR: 900 INJECTION, SOLUTION INTRAVENOUS at 10:05

## 2020-11-17 NOTE — PROGRESS NOTES
[facility] - Chemotherapy Progress Note    [id number] Pt admit to [address] [place] [address] for C11 Herceptin in stable condition. Assessment completed. No new concerns voiced. PAC with positive blood return. Chemotherapy Flowsheet 11/17/2020   Cycle C11D1   Date 11/17/2020   Drug / Regimen Herceprtin   Post Hydration -   Pre Meds -   Notes -         VS  Patient Vitals for the past 12 hrs:   Temp Pulse Resp BP SpO2   11/17/20 1052 -- 68 -- 139/63 --   11/17/20 0918 98.2 °F (36.8 °C) 75 18 130/72 94 %         Medications:  Medications Administered     0.9% sodium chloride infusion     Admin Date  11/17/2020 Action  New Bag Dose  25 mL/hr Rate  25 mL/hr Route  IntraVENous Administered By  Mani Fontana RN          0.9% sodium chloride injection 10 mL     Admin Date  11/17/2020 Action  Given Dose  10 mL Route  IntraVENous Administered By  Mani Fontana RN          heparin (porcine) pf 300-500 Units     Admin Date  11/17/2020 Action  Given Dose  500 Units Route  InterCATHeter Administered By  Mani Fontana RN          saline peripheral flush soln 10 mL     Admin Date  11/17/2020 Action  Given Dose  10 mL Route  InterCATHeter Administered By  Mani Fontana RN           Admin Date  11/17/2020 Action  Given Dose  10 mL Route  InterCATHeter Administered By  Mani Fontana RN          trastuzumab (HERCEPTIN) 630 mg in 0.9% sodium chloride 250 mL, overfill volume 25 mL IVPB     Admin Date  11/17/2020 Action  New Bag Dose  630 mg Rate  610 mL/hr Route  IntraVENous Administered By  Mani Fontana RN                  1055 Pt tolerated treatment well. PAC maintained positive blood return throughout treatment, flushed with positive blood return at conclusion. D/c home in no distress. Pt aware of next appointment scheduled for 12/8 at 09.

## 2020-12-03 RX ORDER — ACETAMINOPHEN 325 MG/1
650 TABLET ORAL
Status: CANCELLED | OUTPATIENT
Start: 2020-12-08

## 2020-12-03 RX ORDER — EPINEPHRINE 1 MG/ML
0.3 INJECTION, SOLUTION, CONCENTRATE INTRAVENOUS AS NEEDED
Status: CANCELLED | OUTPATIENT
Start: 2020-12-08

## 2020-12-03 RX ORDER — DIPHENHYDRAMINE HYDROCHLORIDE 50 MG/ML
50 INJECTION, SOLUTION INTRAMUSCULAR; INTRAVENOUS
Status: CANCELLED | OUTPATIENT
Start: 2020-12-08

## 2020-12-03 RX ORDER — ONDANSETRON 2 MG/ML
8 INJECTION INTRAMUSCULAR; INTRAVENOUS AS NEEDED
Status: CANCELLED | OUTPATIENT
Start: 2020-12-08

## 2020-12-03 RX ORDER — ACETAMINOPHEN 325 MG/1
650 TABLET ORAL AS NEEDED
Status: CANCELLED
Start: 2020-12-08

## 2020-12-03 RX ORDER — DIPHENHYDRAMINE HYDROCHLORIDE 50 MG/ML
50 INJECTION, SOLUTION INTRAMUSCULAR; INTRAVENOUS AS NEEDED
Status: CANCELLED
Start: 2020-12-08

## 2020-12-03 RX ORDER — ALBUTEROL SULFATE 0.83 MG/ML
2.5 SOLUTION RESPIRATORY (INHALATION) AS NEEDED
Status: CANCELLED
Start: 2020-12-08

## 2020-12-03 RX ORDER — DIPHENHYDRAMINE HYDROCHLORIDE 50 MG/ML
25 INJECTION, SOLUTION INTRAMUSCULAR; INTRAVENOUS AS NEEDED
Status: CANCELLED
Start: 2020-12-08

## 2020-12-03 RX ORDER — HYDROCORTISONE SODIUM SUCCINATE 100 MG/2ML
100 INJECTION, POWDER, FOR SOLUTION INTRAMUSCULAR; INTRAVENOUS AS NEEDED
Status: CANCELLED | OUTPATIENT
Start: 2020-12-08

## 2020-12-08 ENCOUNTER — HOSPITAL ENCOUNTER (OUTPATIENT)
Dept: INFUSION THERAPY | Age: 70
Discharge: HOME | End: 2020-12-08
Payer: MEDICARE

## 2020-12-08 ENCOUNTER — OFFICE VISIT (OUTPATIENT)
Dept: ONCOLOGY | Age: 70
End: 2020-12-08
Payer: MEDICARE

## 2020-12-08 VITALS
SYSTOLIC BLOOD PRESSURE: 149 MMHG | OXYGEN SATURATION: 99 % | WEIGHT: 234.3 LBS | HEART RATE: 71 BPM | TEMPERATURE: 98.3 F | BODY MASS INDEX: 36.7 KG/M2 | RESPIRATION RATE: 18 BRPM | DIASTOLIC BLOOD PRESSURE: 69 MMHG

## 2020-12-08 VITALS
OXYGEN SATURATION: 95 % | BODY MASS INDEX: 36.65 KG/M2 | TEMPERATURE: 98.3 F | WEIGHT: 234 LBS | DIASTOLIC BLOOD PRESSURE: 74 MMHG | RESPIRATION RATE: 18 BRPM | HEART RATE: 70 BPM | SYSTOLIC BLOOD PRESSURE: 124 MMHG

## 2020-12-08 DIAGNOSIS — Z17.0 BILATERAL MALIGNANT NEOPLASM OF BREAST IN FEMALE, ESTROGEN RECEPTOR POSITIVE, UNSPECIFIED SITE OF BREAST (HCC): Primary | ICD-10-CM

## 2020-12-08 DIAGNOSIS — Z79.899 ENCOUNTER FOR MONITORING CARDIOTOXIC DRUG THERAPY: ICD-10-CM

## 2020-12-08 DIAGNOSIS — Z51.81 ENCOUNTER FOR MONITORING CARDIOTOXIC DRUG THERAPY: ICD-10-CM

## 2020-12-08 DIAGNOSIS — C50.912 BILATERAL MALIGNANT NEOPLASM OF BREAST IN FEMALE, ESTROGEN RECEPTOR POSITIVE, UNSPECIFIED SITE OF BREAST (HCC): Primary | ICD-10-CM

## 2020-12-08 DIAGNOSIS — C50.911 BILATERAL MALIGNANT NEOPLASM OF BREAST IN FEMALE, ESTROGEN RECEPTOR POSITIVE, UNSPECIFIED SITE OF BREAST (HCC): Primary | ICD-10-CM

## 2020-12-08 PROCEDURE — G8536 NO DOC ELDER MAL SCRN: HCPCS | Performed by: INTERNAL MEDICINE

## 2020-12-08 PROCEDURE — 74011250636 HC RX REV CODE- 250/636: Performed by: INTERNAL MEDICINE

## 2020-12-08 PROCEDURE — 1090F PRES/ABSN URINE INCON ASSESS: CPT | Performed by: INTERNAL MEDICINE

## 2020-12-08 PROCEDURE — 1101F PT FALLS ASSESS-DOCD LE1/YR: CPT | Performed by: INTERNAL MEDICINE

## 2020-12-08 PROCEDURE — G8427 DOCREV CUR MEDS BY ELIG CLIN: HCPCS | Performed by: INTERNAL MEDICINE

## 2020-12-08 PROCEDURE — 3017F COLORECTAL CA SCREEN DOC REV: CPT | Performed by: INTERNAL MEDICINE

## 2020-12-08 PROCEDURE — 96413 CHEMO IV INFUSION 1 HR: CPT

## 2020-12-08 PROCEDURE — G8400 PT W/DXA NO RESULTS DOC: HCPCS | Performed by: INTERNAL MEDICINE

## 2020-12-08 PROCEDURE — G8417 CALC BMI ABV UP PARAM F/U: HCPCS | Performed by: INTERNAL MEDICINE

## 2020-12-08 PROCEDURE — 99214 OFFICE O/P EST MOD 30 MIN: CPT | Performed by: INTERNAL MEDICINE

## 2020-12-08 PROCEDURE — G8432 DEP SCR NOT DOC, RNG: HCPCS | Performed by: INTERNAL MEDICINE

## 2020-12-08 PROCEDURE — G0463 HOSPITAL OUTPT CLINIC VISIT: HCPCS | Performed by: INTERNAL MEDICINE

## 2020-12-08 RX ORDER — HEPARIN 100 UNIT/ML
300-500 SYRINGE INTRAVENOUS AS NEEDED
Status: ACTIVE | OUTPATIENT
Start: 2020-12-08 | End: 2020-12-08

## 2020-12-08 RX ORDER — SODIUM CHLORIDE 0.9 % (FLUSH) 0.9 %
10 SYRINGE (ML) INJECTION AS NEEDED
Status: DISPENSED | OUTPATIENT
Start: 2020-12-08 | End: 2020-12-08

## 2020-12-08 RX ORDER — SODIUM CHLORIDE 9 MG/ML
10 INJECTION INTRAMUSCULAR; INTRAVENOUS; SUBCUTANEOUS AS NEEDED
Status: ACTIVE | OUTPATIENT
Start: 2020-12-08 | End: 2020-12-08

## 2020-12-08 RX ORDER — SODIUM CHLORIDE 9 MG/ML
25 INJECTION, SOLUTION INTRAVENOUS CONTINUOUS
Status: DISPENSED | OUTPATIENT
Start: 2020-12-08 | End: 2020-12-08

## 2020-12-08 RX ADMIN — Medication 500 UNITS: at 13:02

## 2020-12-08 RX ADMIN — Medication 10 ML: at 09:14

## 2020-12-08 RX ADMIN — Medication 10 ML: at 13:02

## 2020-12-08 RX ADMIN — TRASTUZUMAB 630 MG: 150 INJECTION, POWDER, LYOPHILIZED, FOR SOLUTION INTRAVENOUS at 12:28

## 2020-12-08 RX ADMIN — SODIUM CHLORIDE 25 ML/HR: 900 INJECTION, SOLUTION INTRAVENOUS at 12:23

## 2020-12-08 RX ADMIN — SODIUM CHLORIDE 10 ML: 9 INJECTION INTRAMUSCULAR; INTRAVENOUS; SUBCUTANEOUS at 09:13

## 2020-12-08 NOTE — PROGRESS NOTES
Stephanie Pedroza is a 79 y.o. female Follow up for the Evaluation of Breast Cancer. 1. Have you been to the ER, urgent care clinic since your last visit? Hospitalized since your last visit? No    2. Have you seen or consulted any other health care providers outside of the [address] [place] [address] since your last visit? Include any pap smears or colon screening.  No

## 2020-12-08 NOTE — PROGRESS NOTES
[facility] Progress Note    Date: 2020    Name: Adán Murphy    MRN: [id number]         : 1950    Ms. Erika Barrett Arrived ambulatory and in no distress for C12D1 of Herceptin Regimen. Assessment was completed, no acute issues at this time, no new complaints voiced. Right chest wall port accessed without difficulty. Covid Questionnaire completed. 1. Do you have any symptoms of covid 19? SOB, coughing, fever, or generally not feeling well? - no  2. Have you been exposed to covid 19 recently? - no  3. Have you had any recent contact with family/friend that has a pending covid test? no      Chemotherapy Flowsheet 2020   Cycle C12D1   Date 2020   Drug / Regimen Herceptin   Post Hydration -   Pre Meds -   Notes -       Patient proceed to MD appointment. Patient returned with no change in treatment. Ms. José Miguel Medina vitals were reviewed. Visit Vitals  /74 (BP 1 Location: Right arm, BP Patient Position: Sitting)   Pulse 70   Temp 98.3 °F (36.8 °C)   Resp 18   Wt 106.3 kg (234 lb 4.8 oz)   SpO2 95%   BMI 36.70 kg/m²     Patient Vitals for the past 12 hrs:   Temp Pulse Resp BP SpO2   20 1306 -- 71 18 (!) 149/69 99 %   20 0907 98.3 °F (36.8 °C) 70 18 124/74 95 %     Echo results are within parameters to treat.       Medications:  Medications Administered     0.9% sodium chloride infusion     Admin Date  2020 Action  New Bag Dose  25 mL/hr Rate  25 mL/hr Route  IntraVENous Administered By  Williams Huizar RN          0.9% sodium chloride injection 10 mL     Admin Date  2020 Action  Given Dose  10 mL Route  IntraVENous Administered By  Williams Huizar RN          heparin (porcine) pf 300-500 Units     Admin Date  2020 Action  Given Dose  500 Units Route  InterCATHeter Administered By  Williams Huizar RN          saline peripheral flush soln 10 mL     Admin Date  2020 Action  Given Dose  10 mL Route  InterCATHeter Administered By  Williams Huizar RN           Admin Date  12/08/2020 Action  Given Dose  10 mL Route  InterCATHeter Administered By  Mark Mann RN          trastuzumab (HERCEPTIN) 630 mg in 0.9% sodium chloride 250 mL, overfill volume 25 mL IVPB     Admin Date  12/08/2020 Action  New Bag Dose  630 mg Rate  610 mL/hr Route  IntraVENous Administered By  Mark Mann RN                Ms. Paula Leal tolerated treatment well and was discharged from [place] [address] in stable condition at 1310. Port de-accessed, flushed & heparinized per protocol. She is to return on 12/29/2020.

## 2020-12-08 NOTE — PROGRESS NOTES
[facility] [place] at [address], [address] [facility] 100[address] [facility]  Felipa Canard: [phone]  F: [phone]      Reason for Visit:   Alexander Woods is a 79 y.o. female who is seen in consultation at the request of Dr. Lauren Jarrell for evaluation of therapy for breast cancer. Rad onc:  Dr. Kendell Jurado    Treatment History:   · 1/24/20 right breast mass:  9:00 core bx, IMC, gr 1, 5 mm, ER + at 99%, WI + at 97%, HER 2 negative at IHC 0, ki67 8%; left breast mass: 3:00 core bx, IMC, gr 3,  5 mm, ER + at 98%, WI negative, HER 2 POSITIVE at [facility] 3+  · invitae genetic testing:  BRCA2 and RAD50 VUS  · 3/23/20 right breast lumpectomy:  ILC, 7 mm, gr 1, no LVI, 0/8 LN, pT1b pN0 cMo; left breast lumpectomy:  IDC, 1.1 cm, gr 3, 0/2 LN, DCIS present, no LVI, pT1c pN0 cM0  · Paclitaxel/trastuzumab 4/21/20-7/7/2020  · Outback Herceptin 7/14/2020-  · XRT to left breast 8/5/2020-9/8/2020  · Anastrozole 9/16/2020    History of Present Illness: An abnormal mammogram led to the pathology above    Interval history:  In today for follow up and treatment. Complains of gr 1 hot flashes, gr 1 neuropathy    FH: No breast or ovarian cancer, no prostate or pancreas cancer    Past Medical History:   Diagnosis Date    Cancer [facility])       History reviewed. No pertinent surgical history. Social History     Tobacco Use    Smoking status: Never Smoker    Smokeless tobacco: Never Used   Substance Use Topics    Alcohol use: Not Currently      History reviewed. No pertinent family history. Current Outpatient Medications   Medication Sig    carvediloL (COREG) 6.25 mg tablet Take 1 Tab by mouth two (2) times daily (with meals).  metFORMIN (GLUCOPHAGE) 500 mg tablet Take  by mouth two (2) times daily (with meals).  anastrozole (ARIMIDEX) 1 mg tablet Take 1 mg by mouth daily.     ergocalciferol (ERGOCALCIFEROL) 1,250 mcg (50,000 unit) capsule TK 1 C PO 1 TIME A WK    losartan (COZAAR) 50 mg tablet TK 1 T PO QD  omeprazole (PRILOSEC) 20 mg capsule TK 1 C PO QD    rosuvastatin (CRESTOR) 20 mg tablet TK 1 T PO QD    aspirin delayed-release 81 mg tablet Take  by mouth daily.  lidocaine-prilocaine (EMLA) topical cream Apply  to affected area as needed for Pain. No current facility-administered medications for this visit. No Known Allergies     Review of Systems: A complete review of systems was obtained, negative except as described above. Physical Exam:     Visit Vitals  /74 (BP 1 Location: Left arm, BP Patient Position: Sitting)   Pulse 70   Temp 98.3 °F (36.8 °C) (Temporal)   Resp 18   Wt 234 lb (106.1 kg)   SpO2 95%   BMI 36.65 kg/m²     General: No distress  Eyes: Anicteric sclerae  HENT: Atraumatic  Neck: Supple  Respiratory: Normal respiratory effort  CV: No peripheral edema  GI: nondistended  Skin: No rashes, ecchymoses, or petechiae  Psych: Alert, oriented, appropriate affect, normal judgment/insight      Results:     Lab Results   Component Value Date/Time    WBC 5.0 07/07/2020 10:16 AM    HGB 11.3 (L) 07/07/2020 10:16 AM    HCT 32.6 (L) 07/07/2020 10:16 AM    PLATELET 807 26/08/3352 10:16 AM    MCV 90.8 07/07/2020 10:16 AM    ABS. NEUTROPHILS 2.7 07/07/2020 10:16 AM     Lab Results   Component Value Date/Time    Sodium 139 08/25/2020 10:25 AM    Potassium 3.9 08/25/2020 10:25 AM    Chloride 106 08/25/2020 10:25 AM    CO2 28 08/25/2020 10:25 AM    Glucose 150 (H) 08/25/2020 10:25 AM    BUN 9 08/25/2020 10:25 AM    Creatinine 1.04 (H) 08/25/2020 10:25 AM    GFR est AA >60 08/25/2020 10:25 AM    GFR est non-AA 53 (L) 08/25/2020 10:25 AM    Calcium 9.5 08/25/2020 10:25 AM     Lab Results   Component Value Date/Time    Bilirubin, total 0.6 06/23/2020 09:48 AM    ALT (SGPT) 32 06/23/2020 09:48 AM    Alk.  phosphatase 90 06/23/2020 09:48 AM    Protein, total 6.9 06/23/2020 09:48 AM    Albumin 3.3 (L) 06/23/2020 09:48 AM    Globulin 3.6 06/23/2020 09:48 AM       2/11/20 MRI breast  Left:  11 mm mass at 3:00  Right:  9:00, 5 mm mass  No LAD    4/20/20 TTE EF 63%    9/8/20 dexa  L1-4 T 0.4  L fem neck T 0.3  R fem neck T -0.4    Records reviewed and summarized above. Pathology report(s) reviewed above. Radiology report(s) reviewed above. Assessment/plan:   1. Left breast IDC, 1.1 cm, 0/2 LN, ER +, NC negative, HER 2 POSITIVE, gr 3:  Stage IA (both anatomic and prognostic)    We explained to the patient that the goal of systemic adjuvant therapy is to improve the chances for cure and decrease the risk of relapse. We explained why a patient can have microscopic cancer spread now even though physical examination, laboratory studies and imaging studies are negative for cancer. We explained that the same treatments used now as adjuvant or preventive treatments rarely if ever are curative in women who develop metastases. Using eprognosis. org, her 5 year all cause mortality risk is only 6-8%; her 10 year all cause mortality risk is 15-23%; her median OS is 17-21 years. An adjuvant conversation is warranted. Discussed weekly paclitaxel 80 mg/m2 with weekly trastuzumab 4 mg/kg load then 2 mg/kg weekly x 12 followed by completion of a year of trastuzumab 6 mg/kg q 3 weeks. This is APT study. I discussed the potential risks of paclitaxel chemotherapy with the patient. Major toxicities include nausea and vomiting, stomatitis, fatigue. Anemia frequently results. Neutropenic fever is uncommon, but can be a life-threatening problem. We provided the patient with detailed information concerning toxicity including frequent toxicities that only last a few days, such as nausea, vomiting, mouth sores, arthralgia, myalgia, and potentially allergic reactions to paclitaxel, as well as toxicities which can be longer lasting including fatigue, anemia and neuropathy. We provided the patient with detailed information concerning the toxicities of their regimen in addition to our verbal discussion.     Rationale for therapy with trastuzumab was also discussed with the patient including a 50% proportional improvement in disease free survival and also an improvement in overall survival in patients receiving trastuzumab and chemotherapy for HER-2 positive breast cancer. The side effects of trastuzumab were discussed including a 4%-5% risk of dropping her ejection fraction while on treatment and about a 1% risk of CHF. We discussed that this drug will be used every 3 weeks for remainder of a year following the chemotherapy cycles. We will check her EF before chemotherapy and every 3 months while she is receiving trastuzumab. After this discussion, she is agreeable to paclitaxel and trastuzumab as above. She has signed informed consent. TTE on 4/20/2020, EF 63%, TTE on 7/13/2020, 63%, 7% decline in GLS, TTE on 9/9/2020, EF 61% with 7.7% decline in GLS since 7/2020 and 14.7% decline in GLS since 4/2020. Repeat TTE on 10/16/2020, EF 62%, GLS 17.7%, stable since last TTE, next due 1/8/2021, ordered. Outback Herceptin 8/13 today. We will see her back in 6 weeks for #10. Dr. Alin Peña has placed port    rx for zofran, compazine, emla cream    Following chemotherapy, she is an excellent candidate for radiation and endocrine therapy. Started XRT on 8/5/2020 with Dr. Shikha Francis and completed XRT on 9/8/2020. patient is agreeable to Anastrozole 1 mg daily, rx in. Completed xrt on 9/8/2020, started anastrozole on 9/16/2020.    2. Right breast ILC, 7 mm, ER +, GA +, HER 2 negative, gr 1, 0/8 LN, stage IA both anatomic and prognostic    We explained to the patient that the goal of systemic adjuvant therapy is to improve the chances for cure and decrease the risk of relapse. We explained why a patient can have microscopic cancer spread now even though physical examination, laboratory studies and imaging studies are negative for cancer.  We explained that the same treatments used now as adjuvant or preventive treatments rarely if ever are curative in women who develop metastases. Recommend endocrine therapy, see #1 above. While she does not quite meet the criteria on this side for CALGB 9343 (74 yo instead of 70), she does meet KIT guidelines for possibly avoiding XRT on this side, and did so    3. Emotional well being:  She has excellent support and is coping well with her disease    4. BRCA2 and RAD50 VUS:  Not clinically significant at this time    5. Neck itching:  resolved    6. Obesity:  Will monitor following chemo    7. Increased cr: Will monitor. 8. 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 Discussed starting Cymbalta, she declines at this time, will notify us if this worsens. 9. Decrease in GLS:  14.7% decline in GLS since 4/2020. LVEF stable. ProBNP and Trop normal.  Will start Coreg 3.125 BID, rx in. Discussed with Dr. Sheryl Mitchell, ok to continue to resume treatment since troponin and BNP were normal.  Repeat TTE on 10/16/2020 stable, next due 1/8/2021, ordered    10. HM:  Colonoscopy in 10/2020. I appreciate the opportunity to participate in Ms. Gardner Sonal sawyer. Signed By: Ronnie Padilla MD      No orders of the defined types were placed in this encounter.

## 2020-12-11 ENCOUNTER — HOSPITAL ENCOUNTER (OUTPATIENT)
Dept: NON INVASIVE DIAGNOSTICS | Age: 70
Discharge: HOME | End: 2020-12-11
Attending: NURSE PRACTITIONER
Payer: MEDICARE

## 2020-12-11 ENCOUNTER — TELEPHONE (OUTPATIENT)
Dept: ONCOLOGY | Age: 70
End: 2020-12-11

## 2020-12-11 VITALS
SYSTOLIC BLOOD PRESSURE: 155 MMHG | WEIGHT: 234 LBS | HEIGHT: 67 IN | DIASTOLIC BLOOD PRESSURE: 81 MMHG | BODY MASS INDEX: 36.73 KG/M2

## 2020-12-11 DIAGNOSIS — Z51.81 ENCOUNTER FOR MONITORING CARDIOTOXIC DRUG THERAPY: ICD-10-CM

## 2020-12-11 DIAGNOSIS — C50.912 BILATERAL MALIGNANT NEOPLASM OF BREAST IN FEMALE, ESTROGEN RECEPTOR POSITIVE, UNSPECIFIED SITE OF BREAST (HCC): ICD-10-CM

## 2020-12-11 DIAGNOSIS — C50.911 BILATERAL MALIGNANT NEOPLASM OF BREAST IN FEMALE, ESTROGEN RECEPTOR POSITIVE, UNSPECIFIED SITE OF BREAST (HCC): ICD-10-CM

## 2020-12-11 DIAGNOSIS — Z17.0 BILATERAL MALIGNANT NEOPLASM OF BREAST IN FEMALE, ESTROGEN RECEPTOR POSITIVE, UNSPECIFIED SITE OF BREAST (HCC): ICD-10-CM

## 2020-12-11 DIAGNOSIS — Z79.899 ENCOUNTER FOR MONITORING CARDIOTOXIC DRUG THERAPY: ICD-10-CM

## 2020-12-11 LAB
ECHO AO ROOT DIAM: 3.04 CM
ECHO AV AREA PEAK VELOCITY: 1.25 CM2
ECHO AV AREA/BSA PEAK VELOCITY: 0.6 CM2/M2
ECHO AV PEAK GRADIENT: 15.38 MMHG
ECHO AV PEAK VELOCITY: 196.11 CM/S
ECHO EST RA PRESSURE: 3 MMHG
ECHO LA AREA 4C: 20.18 CM2
ECHO LA MAJOR AXIS: 3.59 CM
ECHO LA MINOR AXIS: 1.66 CM
ECHO LA VOL 2C: 51.64 ML (ref 22–52)
ECHO LA VOL 4C: 59.57 ML (ref 22–52)
ECHO LA VOL BP: 60.23 ML (ref 22–52)
ECHO LA VOL/BSA BIPLANE: 27.86 ML/M2 (ref 16–28)
ECHO LA VOLUME INDEX A2C: 23.89 ML/M2 (ref 16–28)
ECHO LA VOLUME INDEX A4C: 27.56 ML/M2 (ref 16–28)
ECHO LV E' LATERAL VELOCITY: 11.01 CENTIMETER/SECOND
ECHO LV E' SEPTAL VELOCITY: 8.46 CENTIMETER/SECOND
ECHO LV INTERNAL DIMENSION DIASTOLIC: 4.61 CM (ref 3.9–5.3)
ECHO LV INTERNAL DIMENSION SYSTOLIC: 3.13 CM
ECHO LV IVSD: 0.88 CM (ref 0.6–0.9)
ECHO LV MASS 2D: 118.3 G (ref 67–162)
ECHO LV MASS INDEX 2D: 54.7 G/M2 (ref 43–95)
ECHO LV POSTERIOR WALL DIASTOLIC: 0.72 CM (ref 0.6–0.9)
ECHO LVOT DIAM: 1.85 CM
ECHO LVOT PEAK GRADIENT: 3.34 MMHG
ECHO LVOT PEAK VELOCITY: 91.41 CM/S
ECHO MV A VELOCITY: 97.13 CENTIMETER/SECOND
ECHO MV AREA PHT: 2.86 CM2
ECHO MV E DECELERATION TIME (DT): 265.6 MS
ECHO MV E VELOCITY: 110.99 CENTIMETER/SECOND
ECHO MV PRESSURE HALF TIME (PHT): 77.02 MS
ECHO PV PEAK INSTANTANEOUS GRADIENT SYSTOLIC: 1.1 MMHG
ECHO PV REGURGITANT MAX VELOCITY: 52.5 CM/S
ECHO RIGHT VENTRICULAR SYSTOLIC PRESSURE (RVSP): 34.38 MMHG
ECHO RV INTERNAL DIMENSION: 3.03 CM
ECHO RV TAPSE: 1.68 CM (ref 1.5–2)
ECHO TV REGURGITANT MAX VELOCITY: 280.07 CM/S
ECHO TV REGURGITANT PEAK GRADIENT: 31.38 MMHG
LA VOL DISK BP: 55.82 ML (ref 22–52)

## 2020-12-11 PROCEDURE — 93306 TTE W/DOPPLER COMPLETE: CPT

## 2020-12-11 NOTE — TELEPHONE ENCOUNTER
12/11/2020 at 1:47 pm--I called and spoke with the patient and verified patient ID [id number]--I let her know that her Echo is stable. She verbalized understanding and had no further question's at that time. ----- Message from Costa Harris NP sent at 12/11/2020  1:28 PM EST -----  Please let her know this is stable. Thanks!

## 2020-12-22 RX ORDER — ALBUTEROL SULFATE 0.83 MG/ML
2.5 SOLUTION RESPIRATORY (INHALATION) AS NEEDED
Status: CANCELLED
Start: 2020-12-29

## 2020-12-22 RX ORDER — DIPHENHYDRAMINE HYDROCHLORIDE 50 MG/ML
50 INJECTION, SOLUTION INTRAMUSCULAR; INTRAVENOUS AS NEEDED
Status: CANCELLED
Start: 2020-12-29

## 2020-12-22 RX ORDER — HYDROCORTISONE SODIUM SUCCINATE 100 MG/2ML
100 INJECTION, POWDER, FOR SOLUTION INTRAMUSCULAR; INTRAVENOUS AS NEEDED
Status: CANCELLED | OUTPATIENT
Start: 2020-12-29

## 2020-12-22 RX ORDER — ACETAMINOPHEN 325 MG/1
650 TABLET ORAL AS NEEDED
Status: CANCELLED
Start: 2020-12-29

## 2020-12-22 RX ORDER — DIPHENHYDRAMINE HYDROCHLORIDE 50 MG/ML
50 INJECTION, SOLUTION INTRAMUSCULAR; INTRAVENOUS
Status: CANCELLED | OUTPATIENT
Start: 2020-12-29

## 2020-12-22 RX ORDER — EPINEPHRINE 1 MG/ML
0.3 INJECTION, SOLUTION, CONCENTRATE INTRAVENOUS AS NEEDED
Status: CANCELLED | OUTPATIENT
Start: 2020-12-29

## 2020-12-22 RX ORDER — ONDANSETRON 2 MG/ML
8 INJECTION INTRAMUSCULAR; INTRAVENOUS AS NEEDED
Status: CANCELLED | OUTPATIENT
Start: 2020-12-29

## 2020-12-22 RX ORDER — ACETAMINOPHEN 325 MG/1
650 TABLET ORAL
Status: CANCELLED | OUTPATIENT
Start: 2020-12-29

## 2020-12-22 RX ORDER — DIPHENHYDRAMINE HYDROCHLORIDE 50 MG/ML
25 INJECTION, SOLUTION INTRAMUSCULAR; INTRAVENOUS AS NEEDED
Status: CANCELLED
Start: 2020-12-29

## 2020-12-29 ENCOUNTER — HOSPITAL ENCOUNTER (OUTPATIENT)
Dept: INFUSION THERAPY | Age: 70
Discharge: HOME | End: 2020-12-29
Payer: MEDICARE

## 2020-12-29 VITALS
SYSTOLIC BLOOD PRESSURE: 132 MMHG | OXYGEN SATURATION: 97 % | BODY MASS INDEX: 36.65 KG/M2 | TEMPERATURE: 96.8 F | WEIGHT: 234 LBS | DIASTOLIC BLOOD PRESSURE: 67 MMHG | RESPIRATION RATE: 18 BRPM | HEART RATE: 67 BPM

## 2020-12-29 DIAGNOSIS — Z17.0 BILATERAL MALIGNANT NEOPLASM OF BREAST IN FEMALE, ESTROGEN RECEPTOR POSITIVE, UNSPECIFIED SITE OF BREAST (HCC): Primary | ICD-10-CM

## 2020-12-29 DIAGNOSIS — C50.912 BILATERAL MALIGNANT NEOPLASM OF BREAST IN FEMALE, ESTROGEN RECEPTOR POSITIVE, UNSPECIFIED SITE OF BREAST (HCC): Primary | ICD-10-CM

## 2020-12-29 DIAGNOSIS — C50.911 BILATERAL MALIGNANT NEOPLASM OF BREAST IN FEMALE, ESTROGEN RECEPTOR POSITIVE, UNSPECIFIED SITE OF BREAST (HCC): Primary | ICD-10-CM

## 2020-12-29 PROCEDURE — 74011250636 HC RX REV CODE- 250/636: Performed by: INTERNAL MEDICINE

## 2020-12-29 PROCEDURE — 96413 CHEMO IV INFUSION 1 HR: CPT

## 2020-12-29 RX ORDER — SODIUM CHLORIDE 9 MG/ML
10 INJECTION INTRAMUSCULAR; INTRAVENOUS; SUBCUTANEOUS AS NEEDED
Status: ACTIVE | OUTPATIENT
Start: 2020-12-29 | End: 2020-12-29

## 2020-12-29 RX ORDER — SODIUM CHLORIDE 0.9 % (FLUSH) 0.9 %
10 SYRINGE (ML) INJECTION AS NEEDED
Status: DISPENSED | OUTPATIENT
Start: 2020-12-29 | End: 2020-12-29

## 2020-12-29 RX ORDER — HEPARIN 100 UNIT/ML
300-500 SYRINGE INTRAVENOUS AS NEEDED
Status: ACTIVE | OUTPATIENT
Start: 2020-12-29 | End: 2020-12-29

## 2020-12-29 RX ORDER — SODIUM CHLORIDE 9 MG/ML
25 INJECTION, SOLUTION INTRAVENOUS CONTINUOUS
Status: DISPENSED | OUTPATIENT
Start: 2020-12-29 | End: 2020-12-29

## 2020-12-29 RX ADMIN — SODIUM CHLORIDE 25 ML/HR: 900 INJECTION, SOLUTION INTRAVENOUS at 10:32

## 2020-12-29 RX ADMIN — TRASTUZUMAB 630 MG: 150 INJECTION, POWDER, LYOPHILIZED, FOR SOLUTION INTRAVENOUS at 10:35

## 2020-12-29 NOTE — PROGRESS NOTES
\[facility] [place] [facility]\"" Progress Note    Date: 2020    Name: Nabor Bowman    MRN: [id number]         : 1950    Ms. Humble Coleman Arrived ambulatory and in no distress for cycle 13 day 1 of Herceptin regimen. Assessment was completed, no acute issues at this time, no new complaints voiced still having numbness and tingling in fingers and toes. R  Chest port with 1 inch bliss accessed without difficulty, labs drawn and in process. Chemotherapy Flowsheet 2020   Cycle C12D1   Date 2020   Drug / Regimen Herceptin   Post Hydration -   Pre Meds -   Notes -         Ms. Barrios's vitals were reviewed. Visit Vitals  BP (!) 123/56 (BP 1 Location: Right arm, BP Patient Position: Sitting)   Pulse 66   Temp 96.8 °F (36 °C)   Resp 18   Wt 106.1 kg (234 lb)   SpO2 97%   Breastfeeding No   BMI 36.65 kg/m²     Echo 64% on      administered as ordered and chemotherapy was initiated. Medications Administered     0.9% sodium chloride infusion     Admin Date  2020 Action  New Bag Dose  25 mL/hr Rate  25 mL/hr Route  IntraVENous Administered By  Yasmine Davis          trastuzumab (HERCEPTIN) 630 mg in 0.9% sodium chloride 250 mL, overfill volume 25 mL IVPB     Admin Date  2020 Action  New Bag Dose  630 mg Rate  610 mL/hr Route  IntraVENous Administered By  Yasmine Davis                  Ms. Humble Coleman tolerated treatment well and was discharged from [place] [address] in stable condition. She is to return on 21 for her next appointment.     [facility]  2020

## 2021-01-12 RX ORDER — DIPHENHYDRAMINE HYDROCHLORIDE 50 MG/ML
50 INJECTION, SOLUTION INTRAMUSCULAR; INTRAVENOUS AS NEEDED
Status: CANCELLED
Start: 2021-01-19

## 2021-01-12 RX ORDER — ACETAMINOPHEN 325 MG/1
650 TABLET ORAL
Status: CANCELLED | OUTPATIENT
Start: 2021-01-19

## 2021-01-12 RX ORDER — ACETAMINOPHEN 325 MG/1
650 TABLET ORAL AS NEEDED
Status: CANCELLED
Start: 2021-01-19

## 2021-01-12 RX ORDER — DIPHENHYDRAMINE HYDROCHLORIDE 50 MG/ML
50 INJECTION, SOLUTION INTRAMUSCULAR; INTRAVENOUS
Status: CANCELLED | OUTPATIENT
Start: 2021-01-19

## 2021-01-12 RX ORDER — EPINEPHRINE 1 MG/ML
0.3 INJECTION, SOLUTION, CONCENTRATE INTRAVENOUS AS NEEDED
Status: CANCELLED | OUTPATIENT
Start: 2021-01-19

## 2021-01-12 RX ORDER — DIPHENHYDRAMINE HYDROCHLORIDE 50 MG/ML
25 INJECTION, SOLUTION INTRAMUSCULAR; INTRAVENOUS AS NEEDED
Status: CANCELLED
Start: 2021-01-19

## 2021-01-12 RX ORDER — ALBUTEROL SULFATE 0.83 MG/ML
2.5 SOLUTION RESPIRATORY (INHALATION) AS NEEDED
Status: CANCELLED
Start: 2021-01-19

## 2021-01-12 RX ORDER — HYDROCORTISONE SODIUM SUCCINATE 100 MG/2ML
100 INJECTION, POWDER, FOR SOLUTION INTRAMUSCULAR; INTRAVENOUS AS NEEDED
Status: CANCELLED | OUTPATIENT
Start: 2021-01-19

## 2021-01-12 RX ORDER — ONDANSETRON 2 MG/ML
8 INJECTION INTRAMUSCULAR; INTRAVENOUS AS NEEDED
Status: CANCELLED | OUTPATIENT
Start: 2021-01-19

## 2021-01-19 ENCOUNTER — HOSPITAL ENCOUNTER (OUTPATIENT)
Dept: INFUSION THERAPY | Age: 71
Discharge: HOME | End: 2021-01-19
Payer: MEDICARE

## 2021-01-19 ENCOUNTER — OFFICE VISIT (OUTPATIENT)
Dept: ONCOLOGY | Age: 71
End: 2021-01-19
Payer: MEDICARE

## 2021-01-19 VITALS
HEIGHT: 67 IN | BODY MASS INDEX: 36.73 KG/M2 | RESPIRATION RATE: 18 BRPM | DIASTOLIC BLOOD PRESSURE: 72 MMHG | SYSTOLIC BLOOD PRESSURE: 150 MMHG | HEART RATE: 104 BPM | WEIGHT: 234 LBS | OXYGEN SATURATION: 97 % | TEMPERATURE: 98.2 F

## 2021-01-19 VITALS
DIASTOLIC BLOOD PRESSURE: 62 MMHG | BODY MASS INDEX: 36.81 KG/M2 | HEART RATE: 66 BPM | HEIGHT: 67 IN | WEIGHT: 234.5 LBS | RESPIRATION RATE: 18 BRPM | TEMPERATURE: 98.3 F | SYSTOLIC BLOOD PRESSURE: 143 MMHG | OXYGEN SATURATION: 97 %

## 2021-01-19 DIAGNOSIS — Z01.810 ENCOUNTER FOR PREPROCEDURAL CARDIOVASCULAR EXAMINATION: ICD-10-CM

## 2021-01-19 DIAGNOSIS — C50.912 BILATERAL MALIGNANT NEOPLASM OF BREAST IN FEMALE, ESTROGEN RECEPTOR POSITIVE, UNSPECIFIED SITE OF BREAST (HCC): Primary | ICD-10-CM

## 2021-01-19 DIAGNOSIS — C50.911 BILATERAL MALIGNANT NEOPLASM OF BREAST IN FEMALE, ESTROGEN RECEPTOR POSITIVE, UNSPECIFIED SITE OF BREAST (HCC): Primary | ICD-10-CM

## 2021-01-19 DIAGNOSIS — Z51.81 ENCOUNTER FOR MONITORING CARDIOTOXIC DRUG THERAPY: ICD-10-CM

## 2021-01-19 DIAGNOSIS — Z17.0 BILATERAL MALIGNANT NEOPLASM OF BREAST IN FEMALE, ESTROGEN RECEPTOR POSITIVE, UNSPECIFIED SITE OF BREAST (HCC): Primary | ICD-10-CM

## 2021-01-19 DIAGNOSIS — Z51.11 CHEMOTHERAPY MANAGEMENT, ENCOUNTER FOR: ICD-10-CM

## 2021-01-19 DIAGNOSIS — Z79.899 ENCOUNTER FOR MONITORING CARDIOTOXIC DRUG THERAPY: ICD-10-CM

## 2021-01-19 DIAGNOSIS — G62.9 NEUROPATHY: ICD-10-CM

## 2021-01-19 DIAGNOSIS — R79.89 ELEVATED SERUM CREATININE: ICD-10-CM

## 2021-01-19 LAB
ALBUMIN SERPL-MCNC: 3.4 G/DL (ref 3.5–5)
ALBUMIN/GLOB SERPL: 0.9 {RATIO} (ref 1.1–2.2)
ALP SERPL-CCNC: 109 U/L (ref 45–117)
ALT SERPL-CCNC: 23 U/L (ref 12–78)
ANION GAP SERPL CALC-SCNC: 2 MMOL/L (ref 5–15)
AST SERPL-CCNC: 17 U/L (ref 15–37)
BILIRUB SERPL-MCNC: 0.5 MG/DL (ref 0.2–1)
BUN SERPL-MCNC: 10 MG/DL (ref 6–20)
BUN/CREAT SERPL: 10 (ref 12–20)
CALCIUM SERPL-MCNC: 9 MG/DL (ref 8.5–10.1)
CHLORIDE SERPL-SCNC: 108 MMOL/L (ref 97–108)
CO2 SERPL-SCNC: 29 MMOL/L (ref 21–32)
CREAT SERPL-MCNC: 1 MG/DL (ref 0.55–1.02)
GLOBULIN SER CALC-MCNC: 3.8 G/DL (ref 2–4)
GLUCOSE SERPL-MCNC: 100 MG/DL (ref 65–100)
POTASSIUM SERPL-SCNC: 3.9 MMOL/L (ref 3.5–5.1)
PROT SERPL-MCNC: 7.2 G/DL (ref 6.4–8.2)
SODIUM SERPL-SCNC: 139 MMOL/L (ref 136–145)

## 2021-01-19 PROCEDURE — 3017F COLORECTAL CA SCREEN DOC REV: CPT | Performed by: INTERNAL MEDICINE

## 2021-01-19 PROCEDURE — 74011250636 HC RX REV CODE- 250/636: Performed by: INTERNAL MEDICINE

## 2021-01-19 PROCEDURE — 1090F PRES/ABSN URINE INCON ASSESS: CPT | Performed by: INTERNAL MEDICINE

## 2021-01-19 PROCEDURE — G8427 DOCREV CUR MEDS BY ELIG CLIN: HCPCS | Performed by: INTERNAL MEDICINE

## 2021-01-19 PROCEDURE — 96413 CHEMO IV INFUSION 1 HR: CPT

## 2021-01-19 PROCEDURE — 77030016057 HC NDL HUBR APOL -B

## 2021-01-19 PROCEDURE — 80053 COMPREHEN METABOLIC PANEL: CPT

## 2021-01-19 PROCEDURE — 36415 COLL VENOUS BLD VENIPUNCTURE: CPT

## 2021-01-19 PROCEDURE — G0463 HOSPITAL OUTPT CLINIC VISIT: HCPCS | Performed by: INTERNAL MEDICINE

## 2021-01-19 PROCEDURE — 99214 OFFICE O/P EST MOD 30 MIN: CPT | Performed by: INTERNAL MEDICINE

## 2021-01-19 PROCEDURE — G8400 PT W/DXA NO RESULTS DOC: HCPCS | Performed by: INTERNAL MEDICINE

## 2021-01-19 PROCEDURE — G8432 DEP SCR NOT DOC, RNG: HCPCS | Performed by: INTERNAL MEDICINE

## 2021-01-19 PROCEDURE — G8536 NO DOC ELDER MAL SCRN: HCPCS | Performed by: INTERNAL MEDICINE

## 2021-01-19 PROCEDURE — G8417 CALC BMI ABV UP PARAM F/U: HCPCS | Performed by: INTERNAL MEDICINE

## 2021-01-19 PROCEDURE — 1101F PT FALLS ASSESS-DOCD LE1/YR: CPT | Performed by: INTERNAL MEDICINE

## 2021-01-19 RX ORDER — SODIUM CHLORIDE 0.9 % (FLUSH) 0.9 %
10 SYRINGE (ML) INJECTION AS NEEDED
Status: DISPENSED | OUTPATIENT
Start: 2021-01-19 | End: 2021-01-19

## 2021-01-19 RX ORDER — SODIUM CHLORIDE 9 MG/ML
10 INJECTION INTRAMUSCULAR; INTRAVENOUS; SUBCUTANEOUS AS NEEDED
Status: ACTIVE | OUTPATIENT
Start: 2021-01-19 | End: 2021-01-19

## 2021-01-19 RX ORDER — HEPARIN 100 UNIT/ML
300-500 SYRINGE INTRAVENOUS AS NEEDED
Status: ACTIVE | OUTPATIENT
Start: 2021-01-19 | End: 2021-01-19

## 2021-01-19 RX ORDER — SODIUM CHLORIDE 9 MG/ML
25 INJECTION, SOLUTION INTRAVENOUS CONTINUOUS
Status: DISPENSED | OUTPATIENT
Start: 2021-01-19 | End: 2021-01-19

## 2021-01-19 RX ADMIN — SODIUM CHLORIDE 10 ML: 9 INJECTION INTRAMUSCULAR; INTRAVENOUS; SUBCUTANEOUS at 12:42

## 2021-01-19 RX ADMIN — Medication 500 UNITS: at 12:42

## 2021-01-19 RX ADMIN — SODIUM CHLORIDE 25 ML/HR: 9 INJECTION, SOLUTION INTRAVENOUS at 11:50

## 2021-01-19 RX ADMIN — TRASTUZUMAB 630 MG: 150 INJECTION, POWDER, LYOPHILIZED, FOR SOLUTION INTRAVENOUS at 11:57

## 2021-01-19 NOTE — PROGRESS NOTES
[facility] [place] at [place] [address]  [address] [facility], [address] [facility] [facility] [phone]  F: [phone]      Reason for Visit:   Teresa Bernal is a 79 y.o. female who is seen in consultation at the request of Dr. Brendan Mcdermott for evaluation of therapy for breast cancer. Rad onc:  Dr. Shayne Sands    Treatment History:   · 1/24/20 right breast mass:  9:00 core bx, IMC, gr 1, 5 mm, ER + at 99%, GA + at 97%, HER 2 negative at IHC 0, ki67 8%; left breast mass: 3:00 core bx, IMC, gr 3,  5 mm, ER + at 98%, GA negative, HER 2 POSITIVE at [facility] 3+  · invitae genetic testing:  BRCA2 and RAD50 VUS  · 3/23/20 right breast lumpectomy:  ILC, 7 mm, gr 1, no LVI, 0/8 LN, pT1b pN0 cMo; left breast lumpectomy:  IDC, 1.1 cm, gr 3, 0/2 LN, DCIS present, no LVI, pT1c pN0 cM0  · Paclitaxel/trastuzumab 4/21/20-7/7/2020  · Outback Herceptin 7/14/2020-  · XRT to left breast 8/5/2020-9/8/2020  · Anastrozole 9/16/2020    History of Present Illness: An abnormal mammogram led to the pathology above    Interval history:  In today for follow up and treatment. Complains of gr 1 hot flashes, gr 1 neuropathy. FH:  No breast or ovarian cancer, no prostate or pancreas cancer    Past Medical History:   Diagnosis Date    Cancer [facility])       History reviewed. No pertinent surgical history. Social History     Tobacco Use    Smoking status: Never Smoker    Smokeless tobacco: Never Used   Substance Use Topics    Alcohol use: Not Currently      History reviewed. No pertinent family history. Current Outpatient Medications   Medication Sig    carvediloL (COREG) 6.25 mg tablet Take 1 Tab by mouth two (2) times daily (with meals).  metFORMIN (GLUCOPHAGE) 500 mg tablet Take  by mouth two (2) times daily (with meals).  anastrozole (ARIMIDEX) 1 mg tablet Take 1 mg by mouth daily.     ergocalciferol (ERGOCALCIFEROL) 1,250 mcg (50,000 unit) capsule TK 1 C PO 1 TIME A WK    losartan (COZAAR) 50 mg tablet TK 1 T PO QD  omeprazole (PRILOSEC) 20 mg capsule TK 1 C PO QD    rosuvastatin (CRESTOR) 20 mg tablet TK 1 T PO QD    aspirin delayed-release 81 mg tablet Take  by mouth daily.  lidocaine-prilocaine (EMLA) topical cream Apply  to affected area as needed for Pain. No current facility-administered medications for this visit. Facility-Administered Medications Ordered in Other Visits   Medication Dose Route Frequency    saline peripheral flush soln 10 mL  10 mL InterCATHeter PRN    0.9% sodium chloride injection 10 mL  10 mL IntraVENous PRN    heparin (porcine) pf 300-500 Units  300-500 Units InterCATHeter PRN      No Known Allergies     Review of Systems: A complete review of systems was obtained, negative except as described above. Physical Exam:     Visit Vitals  BP (!) 150/72 (BP 1 Location: Left arm, BP Patient Position: Sitting) Comment: I rechecked BP and it was[de-identified] 146/73 and Pulse--69. Pulse (!) 104   Temp 98.2 °F (36.8 °C) (Temporal)   Resp 18   Ht 5' 7\" (1.702 m)   Wt 234 lb (106.1 kg)   SpO2 97%   BMI 36.65 kg/m²     General: No distress  Eyes: Anicteric sclerae  HENT: Atraumatic  Neck: Supple  Respiratory: Normal respiratory effort  CV: No peripheral edema  GI: nondistended  Skin: No rashes, ecchymoses, or petechiae  Psych: Alert, oriented, appropriate affect, normal judgment/insight      Results:     Lab Results   Component Value Date/Time    WBC 5.0 07/07/2020 10:16 AM    HGB 11.3 (L) 07/07/2020 10:16 AM    HCT 32.6 (L) 07/07/2020 10:16 AM    PLATELET 507 18/45/3055 10:16 AM    MCV 90.8 07/07/2020 10:16 AM    ABS.  NEUTROPHILS 2.7 07/07/2020 10:16 AM     Lab Results   Component Value Date/Time    Sodium 139 08/25/2020 10:25 AM    Potassium 3.9 08/25/2020 10:25 AM    Chloride 106 08/25/2020 10:25 AM    CO2 28 08/25/2020 10:25 AM    Glucose 150 (H) 08/25/2020 10:25 AM    BUN 9 08/25/2020 10:25 AM    Creatinine 1.04 (H) 08/25/2020 10:25 AM    GFR est AA >60 08/25/2020 10:25 AM    GFR est non-AA 53 (L) 08/25/2020 10:25 AM    Calcium 9.5 08/25/2020 10:25 AM     Lab Results   Component Value Date/Time    Bilirubin, total 0.6 06/23/2020 09:48 AM    ALT (SGPT) 32 06/23/2020 09:48 AM    Alk. phosphatase 90 06/23/2020 09:48 AM    Protein, total 6.9 06/23/2020 09:48 AM    Albumin 3.3 (L) 06/23/2020 09:48 AM    Globulin 3.6 06/23/2020 09:48 AM       2/11/20 MRI breast  Left:  11 mm mass at 3:00  Right:  9:00, 5 mm mass  No LAD    4/20/20 TTE EF 63%    9/8/20 dexa  L1-4 T 0.4  L fem neck T 0.3  R fem neck T -0.4    Records reviewed and summarized above. Pathology report(s) reviewed above. Radiology report(s) reviewed above. Assessment/plan:   1. Left breast IDC, 1.1 cm, 0/2 LN, ER +, CO negative, HER 2 POSITIVE, gr 3:  Stage IA (both anatomic and prognostic)    We explained to the patient that the goal of systemic adjuvant therapy is to improve the chances for cure and decrease the risk of relapse. We explained why a patient can have microscopic cancer spread now even though physical examination, laboratory studies and imaging studies are negative for cancer. We explained that the same treatments used now as adjuvant or preventive treatments rarely if ever are curative in women who develop metastases. Using eprognosis. org, her 5 year all cause mortality risk is only 6-8%; her 10 year all cause mortality risk is 15-23%; her median OS is 17-21 years. An adjuvant conversation is warranted. Discussed weekly paclitaxel 80 mg/m2 with weekly trastuzumab 4 mg/kg load then 2 mg/kg weekly x 12 followed by completion of a year of trastuzumab 6 mg/kg q 3 weeks. This is APT study. I discussed the potential risks of paclitaxel chemotherapy with the patient. Major toxicities include nausea and vomiting, stomatitis, fatigue. Anemia frequently results. Neutropenic fever is uncommon, but can be a life-threatening problem.  We provided the patient with detailed information concerning toxicity including frequent toxicities that only last a few days, such as nausea, vomiting, mouth sores, arthralgia, myalgia, and potentially allergic reactions to paclitaxel, as well as toxicities which can be longer lasting including fatigue, anemia and neuropathy. We provided the patient with detailed information concerning the toxicities of their regimen in addition to our verbal discussion.    Rationale for therapy with trastuzumab was also discussed with the patient including a 50% proportional improvement in disease free survival and also an improvement in overall survival in patients receiving trastuzumab and chemotherapy for HER-2 positive breast cancer.  The side effects of trastuzumab were discussed including a 4%-5% risk of dropping her ejection fraction while on treatment and about a 1% risk of CHF.  We discussed that this drug will be used every 3 weeks for remainder of a year following the chemotherapy cycles.  We will check her EF before chemotherapy and every 3 months while she is receiving trastuzumab.    After this discussion, she is agreeable to paclitaxel and trastuzumab as above. She has signed informed consent.    TTE on 4/20/2020, EF 63%, TTE on 7/13/2020, 63%, 7% decline in GLS, TTE on 9/9/2020, EF 61% with 7.7% decline in GLS since 7/2020 and 14.7% decline in GLS since 4/2020.  Repeat TTE on 10/16/2020, EF 62%, GLS 17.7%, stable since last TTE, TTE on 12/11/2020, EF 64%, GLS stable, next due 3/12/21, ordered.    Outback Herceptin 10/13 today. We will see her back in 9 weeks for #13.    Dr. Hoyt has placed port    rx for zofran, compazine, emla cream    Following chemotherapy, she is an excellent candidate for radiation and endocrine therapy.  Started XRT on 8/5/2020 with Dr. Carmichael and completed XRT on 9/8/2020.     patient is agreeable to Anastrozole 1 mg daily, rx in.  Completed xrt on 9/8/2020, started anastrozole on 9/16/2020.    Will see Dr. Hoyt in march for mammogram    2. Right breast ILC, 7 mm, ER +, [place]  +, HER 2 negative, gr 1, 0/8 LN, stage IA both anatomic and prognostic    We explained to the patient that the goal of systemic adjuvant therapy is to improve the chances for cure and decrease the risk of relapse. We explained why a patient can have microscopic cancer spread now even though physical examination, laboratory studies and imaging studies are negative for cancer. We explained that the same treatments used now as adjuvant or preventive treatments rarely if ever are curative in women who develop metastases. Recommend endocrine therapy, see #1 above. While she does not quite meet the criteria on this side for CALGB 9343 (76 yo instead of 70), she does meet KIT guidelines for possibly avoiding XRT on this side, and did so    This is a separate cancer    3. Emotional well being:  She has excellent support and is coping well with her disease    4. BRCA2 and RAD50 VUS:  Not clinically significant at this time    5. Neck itching:  resolved    6. Obesity:  Will monitor following chemo    7. Increased cr: Will monitor. Will check BMP today. 8. 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 Gr 1. Discussed starting Cymbalta, she declines at this time, will notify us if this worsens. 9. Decrease in GLS:  14.7% decline in GLS since 4/2020. LVEF stable. ProBNP and Trop normal.  Will start Coreg 3.125 BID, rx in. Discussed with enzo Salas to continue to resume treatment since troponin and BNP were normal.  Repeat TTE on 10/16/2020 and 12/11/20 stable    10. HM:  Colonoscopy in 10/2020. This patient was seen in conjunction with Kaci Cesar NP. I personally reviewed the history and all points in the assessment and plan with the patient, and performed key points on the exam.       I appreciate the opportunity to participate in Ms. Jenean Heimlich care.     Signed By: Killian Howard MD      No orders of the defined types were placed in this encounter.

## 2021-01-19 NOTE — PROGRESS NOTES
[facility] Progress Note    Date: 2021    Name: Nereyda Louis    MRN: [id number]         : 1950    Ms. Deshaun Calderon Arrived ambulatory and in no distress for C14D1 of Herceptin Regimen. Assessment was completed, no acute issues at this time, no new complaints voiced. Right chest wall port accessed without difficulty, labs drawn & sent for processing. Chemotherapy Flowsheet 2021   Cycle C14D1   Date 2021   Drug / Regimen Herceptin   Post Hydration -   Pre Meds -   Notes given       925 Patient proceed to appointment with Dr. Teetee Gil. Ms. Daniel Acuña vitals were reviewed. Visit Vitals  BP (!) 143/62   Pulse 66   Temp 98.3 °F (36.8 °C)   Resp 18   Ht 5' 7\" (1.702 m)   Wt 106.4 kg (234 lb 8 oz)   SpO2 97%   BMI 36.73 kg/m²       Lab results were obtained and reviewed. Recent Results (from the past 12 hour(s))   METABOLIC PANEL, COMPREHENSIVE    Collection Time: 21 11:36 AM   Result Value Ref Range    Sodium 139 136 - 145 mmol/L    Potassium 3.9 3.5 - 5.1 mmol/L    Chloride 108 97 - 108 mmol/L    CO2 29 21 - 32 mmol/L    Anion gap 2 (L) 5 - 15 mmol/L    Glucose 100 65 - 100 mg/dL    BUN 10 6 - 20 MG/DL    Creatinine 1.00 0.55 - 1.02 MG/DL    BUN/Creatinine ratio 10 (L) 12 - 20      GFR est AA >60 >60 ml/min/1.73m2    GFR est non-AA 55 (L) >60 ml/min/1.73m2    Calcium 9.0 8.5 - 10.1 MG/DL    Bilirubin, total 0.5 0.2 - 1.0 MG/DL    ALT (SGPT) 23 12 - 78 U/L    AST (SGOT) 17 15 - 37 U/L    Alk.  phosphatase 109 45 - 117 U/L    Protein, total 7.2 6.4 - 8.2 g/dL    Albumin 3.4 (L) 3.5 - 5.0 g/dL    Globulin 3.8 2.0 - 4.0 g/dL    A-G Ratio 0.9 (L) 1.1 - 2.2         Medications:    Medications Administered     0.9% sodium chloride infusion     Admin Date  2021 Action  New Bag Dose  25 mL/hr Rate  25 mL/hr Route  IntraVENous Administered By  Antonieta Nassar RN          0.9% sodium chloride injection 10 mL     Admin Date  2021 Action  Given Dose  10 mL Route  IntraVENous Administered By  Bernardo Swann RN          heparin (porcine) pf 300-500 Units     Admin Date  01/19/2021 Action  Given Dose  500 Units Route  InterCATHeter Administered By  Bernardo Swann RN          trastuzumab (HERCEPTIN) 630 mg in 0.9% sodium chloride 250 mL, overfill volume 25 mL IVPB     Admin Date  01/19/2021 Action  New Bag Dose  630 mg Rate  610 mL/hr Route  IntraVENous Administered By  Bernardo Swann RN                  Ms. Stephany Kim tolerated treatment well and was discharged from [place] [address] in stable condition. Port de-accessed, flushed & heparinized per protocol. She is to return on 2/9/21 for her next appointment.     Eric Jay RN  January 19, 2021

## 2021-01-19 NOTE — PROGRESS NOTES
Kimber Alvarez is a 79 y.o. female Follow up for the evaluation of breast cancer. 1. Have you been to the ER, urgent care clinic since your last visit? Hospitalized since your last visit? No    2. Have you seen or consulted any other health care providers outside of the [address] [place] [address] since your last visit? Include any pap smears or colon screening.  No

## 2021-02-01 RX ORDER — ACETAMINOPHEN 325 MG/1
650 TABLET ORAL
Status: CANCELLED | OUTPATIENT
Start: 2021-02-09

## 2021-02-01 RX ORDER — EPINEPHRINE 1 MG/ML
0.3 INJECTION, SOLUTION, CONCENTRATE INTRAVENOUS AS NEEDED
Status: CANCELLED | OUTPATIENT
Start: 2021-02-09

## 2021-02-01 RX ORDER — ACETAMINOPHEN 325 MG/1
650 TABLET ORAL AS NEEDED
Status: CANCELLED
Start: 2021-02-09

## 2021-02-01 RX ORDER — DIPHENHYDRAMINE HYDROCHLORIDE 50 MG/ML
50 INJECTION, SOLUTION INTRAMUSCULAR; INTRAVENOUS
Status: CANCELLED | OUTPATIENT
Start: 2021-02-09

## 2021-02-01 RX ORDER — ONDANSETRON 2 MG/ML
8 INJECTION INTRAMUSCULAR; INTRAVENOUS AS NEEDED
Status: CANCELLED | OUTPATIENT
Start: 2021-02-09

## 2021-02-01 RX ORDER — DIPHENHYDRAMINE HYDROCHLORIDE 50 MG/ML
50 INJECTION, SOLUTION INTRAMUSCULAR; INTRAVENOUS AS NEEDED
Status: CANCELLED
Start: 2021-02-09

## 2021-02-01 RX ORDER — ALBUTEROL SULFATE 0.83 MG/ML
2.5 SOLUTION RESPIRATORY (INHALATION) AS NEEDED
Status: CANCELLED
Start: 2021-02-09

## 2021-02-01 RX ORDER — HYDROCORTISONE SODIUM SUCCINATE 100 MG/2ML
100 INJECTION, POWDER, FOR SOLUTION INTRAMUSCULAR; INTRAVENOUS AS NEEDED
Status: CANCELLED | OUTPATIENT
Start: 2021-02-09

## 2021-02-01 RX ORDER — DIPHENHYDRAMINE HYDROCHLORIDE 50 MG/ML
25 INJECTION, SOLUTION INTRAMUSCULAR; INTRAVENOUS AS NEEDED
Status: CANCELLED
Start: 2021-02-09

## 2021-02-09 ENCOUNTER — HOSPITAL ENCOUNTER (OUTPATIENT)
Dept: INFUSION THERAPY | Age: 71
Discharge: HOME | End: 2021-02-09
Payer: MEDICARE

## 2021-02-09 VITALS
DIASTOLIC BLOOD PRESSURE: 79 MMHG | TEMPERATURE: 96.1 F | WEIGHT: 233.7 LBS | OXYGEN SATURATION: 100 % | HEART RATE: 69 BPM | HEIGHT: 67 IN | RESPIRATION RATE: 18 BRPM | BODY MASS INDEX: 36.68 KG/M2 | SYSTOLIC BLOOD PRESSURE: 133 MMHG

## 2021-02-09 DIAGNOSIS — C50.911 BILATERAL MALIGNANT NEOPLASM OF BREAST IN FEMALE, ESTROGEN RECEPTOR POSITIVE, UNSPECIFIED SITE OF BREAST (HCC): Primary | ICD-10-CM

## 2021-02-09 DIAGNOSIS — Z17.0 BILATERAL MALIGNANT NEOPLASM OF BREAST IN FEMALE, ESTROGEN RECEPTOR POSITIVE, UNSPECIFIED SITE OF BREAST (HCC): Primary | ICD-10-CM

## 2021-02-09 DIAGNOSIS — C50.912 BILATERAL MALIGNANT NEOPLASM OF BREAST IN FEMALE, ESTROGEN RECEPTOR POSITIVE, UNSPECIFIED SITE OF BREAST (HCC): Primary | ICD-10-CM

## 2021-02-09 PROCEDURE — 77030016057 HC NDL HUBR APOL -B

## 2021-02-09 PROCEDURE — 96413 CHEMO IV INFUSION 1 HR: CPT

## 2021-02-09 PROCEDURE — 74011250636 HC RX REV CODE- 250/636: Performed by: INTERNAL MEDICINE

## 2021-02-09 RX ORDER — SODIUM CHLORIDE 9 MG/ML
25 INJECTION, SOLUTION INTRAVENOUS CONTINUOUS
Status: DISPENSED | OUTPATIENT
Start: 2021-02-09 | End: 2021-02-09

## 2021-02-09 RX ORDER — SODIUM CHLORIDE 0.9 % (FLUSH) 0.9 %
10 SYRINGE (ML) INJECTION AS NEEDED
Status: DISPENSED | OUTPATIENT
Start: 2021-02-09 | End: 2021-02-09

## 2021-02-09 RX ORDER — HEPARIN 100 UNIT/ML
300-500 SYRINGE INTRAVENOUS AS NEEDED
Status: ACTIVE | OUTPATIENT
Start: 2021-02-09 | End: 2021-02-09

## 2021-02-09 RX ORDER — SODIUM CHLORIDE 9 MG/ML
10 INJECTION INTRAMUSCULAR; INTRAVENOUS; SUBCUTANEOUS AS NEEDED
Status: ACTIVE | OUTPATIENT
Start: 2021-02-09 | End: 2021-02-09

## 2021-02-09 RX ADMIN — Medication 500 UNITS: at 10:57

## 2021-02-09 RX ADMIN — TRASTUZUMAB 630 MG: 150 INJECTION, POWDER, LYOPHILIZED, FOR SOLUTION INTRAVENOUS at 10:21

## 2021-02-09 RX ADMIN — SODIUM CHLORIDE 25 ML/HR: 9 INJECTION, SOLUTION INTRAVENOUS at 10:15

## 2021-02-09 RX ADMIN — SODIUM CHLORIDE 10 ML: 9 INJECTION INTRAMUSCULAR; INTRAVENOUS; SUBCUTANEOUS at 10:57

## 2021-02-09 NOTE — PROGRESS NOTES
\[facility]\"" Progress Note    Date: 2021    Name: Tony Ramos    MRN: [id number]         : 1950    Ms. Em Carroll Arrived ambulatory and in no distress for C15D1 of Herceptin Regimen. Assessment was completed, no acute issues at this time, no new complaints voiced. Right chest wall port accessed without difficulty, labs drawn & sent for processing. Chemotherapy Flowsheet 2021   Cycle C15D1   Date 2021   Drug / Regimen Herceptin   Post Hydration -   Pre Meds -   Notes given       Ms. Barrios's vitals were reviewed. Visit Vitals  /79   Pulse 69   Temp (!) 96.1 °F (35.6 °C)   Resp 18   Ht 5' 7\" (1.702 m)   Wt 106 kg (233 lb 11.2 oz)   SpO2 100%   BMI 36.60 kg/m²     1. Do you have any symptoms of COVID-19? SOB, coughing, fever, or generally not feeling well NO    2. Have you been exposed to COVID-19 recently? NO    3. Have you had any recent contact with family/friend that has a pending COVID test? NO  Medications:  Medications Administered     0.9% sodium chloride infusion     Admin Date  2021 Action  New Bag Dose  25 mL/hr Rate  25 mL/hr Route  IntraVENous Administered By  Jonh Ureña RN          0.9% sodium chloride injection 10 mL     Admin Date  2021 Action  Given Dose  10 mL Route  IntraVENous Administered By  Jonh Ureña RN          heparin (porcine) pf 300-500 Units     Admin Date  2021 Action  Given Dose  500 Units Route  InterCATHeter Administered By  Jonh Ureña RN          trastuzumab (HERCEPTIN) 630 mg in 0.9% sodium chloride 250 mL, overfill volume 25 mL IVPB     Admin Date  2021 Action  New Bag Dose  630 mg Rate  610 mL/hr Route  IntraVENous Administered By  Jonh Ureña RN                  Ms. Em Carroll tolerated treatment well and was discharged from [place] [address] in stable condition. Port de-accessed, flushed & heparinized per protocol. She is to return on 3/2/21 for her next appointment.     Lindsay Arthur Kendal Davis  February 9, 2021

## 2021-02-23 RX ORDER — ACETAMINOPHEN 325 MG/1
650 TABLET ORAL
Status: CANCELLED | OUTPATIENT
Start: 2021-03-02

## 2021-02-23 RX ORDER — DIPHENHYDRAMINE HYDROCHLORIDE 50 MG/ML
25 INJECTION, SOLUTION INTRAMUSCULAR; INTRAVENOUS AS NEEDED
Status: CANCELLED
Start: 2021-03-02

## 2021-02-23 RX ORDER — DIPHENHYDRAMINE HYDROCHLORIDE 50 MG/ML
50 INJECTION, SOLUTION INTRAMUSCULAR; INTRAVENOUS
Status: CANCELLED | OUTPATIENT
Start: 2021-03-02

## 2021-02-23 RX ORDER — HYDROCORTISONE SODIUM SUCCINATE 100 MG/2ML
100 INJECTION, POWDER, FOR SOLUTION INTRAMUSCULAR; INTRAVENOUS AS NEEDED
Status: CANCELLED | OUTPATIENT
Start: 2021-03-02

## 2021-02-23 RX ORDER — ALBUTEROL SULFATE 0.83 MG/ML
2.5 SOLUTION RESPIRATORY (INHALATION) AS NEEDED
Status: CANCELLED
Start: 2021-03-02

## 2021-02-23 RX ORDER — ONDANSETRON 2 MG/ML
8 INJECTION INTRAMUSCULAR; INTRAVENOUS AS NEEDED
Status: CANCELLED | OUTPATIENT
Start: 2021-03-02

## 2021-02-23 RX ORDER — EPINEPHRINE 1 MG/ML
0.3 INJECTION, SOLUTION, CONCENTRATE INTRAVENOUS AS NEEDED
Status: CANCELLED | OUTPATIENT
Start: 2021-03-02

## 2021-02-23 RX ORDER — ACETAMINOPHEN 325 MG/1
650 TABLET ORAL AS NEEDED
Status: CANCELLED
Start: 2021-03-02

## 2021-02-23 RX ORDER — DIPHENHYDRAMINE HYDROCHLORIDE 50 MG/ML
50 INJECTION, SOLUTION INTRAMUSCULAR; INTRAVENOUS AS NEEDED
Status: CANCELLED
Start: 2021-03-02

## 2021-03-01 RX ORDER — CARVEDILOL 6.25 MG/1
TABLET ORAL
Qty: 60 TAB | Refills: 3 | Status: SHIPPED | OUTPATIENT
Start: 2021-03-01 | End: 2021-06-28

## 2021-03-02 ENCOUNTER — HOSPITAL ENCOUNTER (OUTPATIENT)
Dept: INFUSION THERAPY | Age: 71
Discharge: HOME | End: 2021-03-02
Payer: MEDICARE

## 2021-03-02 VITALS
RESPIRATION RATE: 18 BRPM | BODY MASS INDEX: 36.84 KG/M2 | HEART RATE: 68 BPM | DIASTOLIC BLOOD PRESSURE: 67 MMHG | WEIGHT: 234.7 LBS | TEMPERATURE: 97.4 F | SYSTOLIC BLOOD PRESSURE: 149 MMHG | HEIGHT: 67 IN | OXYGEN SATURATION: 99 %

## 2021-03-02 DIAGNOSIS — Z17.0 BILATERAL MALIGNANT NEOPLASM OF BREAST IN FEMALE, ESTROGEN RECEPTOR POSITIVE, UNSPECIFIED SITE OF BREAST (HCC): Primary | ICD-10-CM

## 2021-03-02 DIAGNOSIS — C50.912 BILATERAL MALIGNANT NEOPLASM OF BREAST IN FEMALE, ESTROGEN RECEPTOR POSITIVE, UNSPECIFIED SITE OF BREAST (HCC): Primary | ICD-10-CM

## 2021-03-02 DIAGNOSIS — C50.911 BILATERAL MALIGNANT NEOPLASM OF BREAST IN FEMALE, ESTROGEN RECEPTOR POSITIVE, UNSPECIFIED SITE OF BREAST (HCC): Primary | ICD-10-CM

## 2021-03-02 PROCEDURE — 74011250636 HC RX REV CODE- 250/636: Performed by: INTERNAL MEDICINE

## 2021-03-02 PROCEDURE — 96413 CHEMO IV INFUSION 1 HR: CPT

## 2021-03-02 PROCEDURE — 74011000258 HC RX REV CODE- 258: Performed by: INTERNAL MEDICINE

## 2021-03-02 PROCEDURE — 77030016057 HC NDL HUBR APOL -B

## 2021-03-02 RX ORDER — SODIUM CHLORIDE 9 MG/ML
10 INJECTION INTRAMUSCULAR; INTRAVENOUS; SUBCUTANEOUS AS NEEDED
Status: ACTIVE | OUTPATIENT
Start: 2021-03-02 | End: 2021-03-02

## 2021-03-02 RX ORDER — HEPARIN 100 UNIT/ML
300-500 SYRINGE INTRAVENOUS AS NEEDED
Status: ACTIVE | OUTPATIENT
Start: 2021-03-02 | End: 2021-03-02

## 2021-03-02 RX ORDER — SODIUM CHLORIDE 0.9 % (FLUSH) 0.9 %
10 SYRINGE (ML) INJECTION AS NEEDED
Status: DISPENSED | OUTPATIENT
Start: 2021-03-02 | End: 2021-03-02

## 2021-03-02 RX ORDER — SODIUM CHLORIDE 9 MG/ML
25 INJECTION, SOLUTION INTRAVENOUS CONTINUOUS
Status: DISPENSED | OUTPATIENT
Start: 2021-03-02 | End: 2021-03-02

## 2021-03-02 RX ADMIN — Medication 500 UNITS: at 11:06

## 2021-03-02 RX ADMIN — SODIUM CHLORIDE 25 ML/HR: 900 INJECTION, SOLUTION INTRAVENOUS at 10:23

## 2021-03-02 RX ADMIN — TRASTUZUMAB 630 MG: 150 INJECTION, POWDER, LYOPHILIZED, FOR SOLUTION INTRAVENOUS at 10:28

## 2021-03-02 RX ADMIN — SODIUM CHLORIDE 10 ML: 9 INJECTION INTRAMUSCULAR; INTRAVENOUS; SUBCUTANEOUS at 11:06

## 2021-03-02 NOTE — PROGRESS NOTES
[facility] Progress Note    Date: 2021    Name: Marian Nichols    MRN: [id number]         : 1950    Ms. Yuliya Raymond Arrived ambulatory and in no distress for C16D1 of Herceptin Regimen. Assessment was completed, no acute issues at this time, no new complaints voiced. Right chest wall port accessed without difficulty, labs drawn & sent for processing. Chemotherapy Flowsheet 3/2/2021   Cycle C16D1   Date 3/2/2021   Drug / Regimen Herceptin   Post Hydration -   Pre Meds -   Notes -       Ms. Barrios's vitals were reviewed. Visit Vitals  /60   Pulse 78   Temp 97.4 °F (36.3 °C)   Resp 18   Ht 5' 7\" (1.702 m)   Wt 106.5 kg (234 lb 11.2 oz)   SpO2 99%   BMI 36.76 kg/m²         Medications:  Medications Administered     0.9% sodium chloride infusion     Admin Date  2021 Action  New Bag Dose  25 mL/hr Rate  25 mL/hr Route  IntraVENous Administered By  Leopold Buss, RN          0.9% sodium chloride injection 10 mL     Admin Date  2021 Action  Given Dose  10 mL Route  IntraVENous Administered By  Leopold Buss, RN          heparin (porcine) pf 300-500 Units     Admin Date  2021 Action  Given Dose  500 Units Route  InterCATHeter Administered By  Leopold Buss, RN          trastuzumab (HERCEPTIN) 630 mg in 0.9% sodium chloride 250 mL, overfill volume 25 mL IVPB     Admin Date  2021 Action  New Bag Dose  630 mg Rate  610 mL/hr Route  IntraVENous Administered By  Leopold Buss, RN                  Ms. Yuliya Raymond tolerated treatment well and was discharged from [place] [address] in stable condition. Port de-accessed, flushed & heparinized per protocol. She is to return on 3/23/21 for her next appointment.     Jessee Gómez RN  2021

## 2021-03-12 ENCOUNTER — ANCILLARY PROCEDURE (OUTPATIENT)
Dept: CARDIOLOGY CLINIC | Age: 71
End: 2021-03-12
Payer: MEDICARE

## 2021-03-12 VITALS
DIASTOLIC BLOOD PRESSURE: 70 MMHG | HEIGHT: 67 IN | SYSTOLIC BLOOD PRESSURE: 130 MMHG | WEIGHT: 234 LBS | BODY MASS INDEX: 36.73 KG/M2

## 2021-03-12 DIAGNOSIS — Z01.810 ENCOUNTER FOR PREPROCEDURAL CARDIOVASCULAR EXAMINATION: ICD-10-CM

## 2021-03-12 DIAGNOSIS — C50.911 BILATERAL MALIGNANT NEOPLASM OF BREAST IN FEMALE, ESTROGEN RECEPTOR POSITIVE, UNSPECIFIED SITE OF BREAST (HCC): ICD-10-CM

## 2021-03-12 DIAGNOSIS — Z17.0 BILATERAL MALIGNANT NEOPLASM OF BREAST IN FEMALE, ESTROGEN RECEPTOR POSITIVE, UNSPECIFIED SITE OF BREAST (HCC): ICD-10-CM

## 2021-03-12 DIAGNOSIS — C50.912 BILATERAL MALIGNANT NEOPLASM OF BREAST IN FEMALE, ESTROGEN RECEPTOR POSITIVE, UNSPECIFIED SITE OF BREAST (HCC): ICD-10-CM

## 2021-03-12 PROCEDURE — 93306 TTE W/DOPPLER COMPLETE: CPT | Performed by: INTERNAL MEDICINE

## 2021-03-13 LAB
ECHO AO ROOT DIAM: 2.8 CM
ECHO AV MEAN GRADIENT: 7.54 MMHG
ECHO AV PEAK GRADIENT: 13.48 MMHG
ECHO AV PEAK VELOCITY: 183.58 CM/S
ECHO AV VTI: 37.54 CM
ECHO LA AREA 4C: 20.13 CM2
ECHO LA MAJOR AXIS: 4.65 CM
ECHO LA MINOR AXIS: 2.15 CM
ECHO LA VOL 2C: 57.25 ML (ref 22–52)
ECHO LA VOL 4C: 54.47 ML (ref 22–52)
ECHO LA VOL BP: 61.05 ML (ref 22–52)
ECHO LA VOL/BSA BIPLANE: 28.24 ML/M2 (ref 16–28)
ECHO LA VOLUME INDEX A2C: 26.49 ML/M2 (ref 16–28)
ECHO LA VOLUME INDEX A4C: 25.2 ML/M2 (ref 16–28)
ECHO LV E' LATERAL VELOCITY: 11.25 CM/S
ECHO LV E' SEPTAL VELOCITY: 6.33 CM/S
ECHO LV EDV A2C: 72.88 ML
ECHO LV EDV A4C: 87.6 ML
ECHO LV EDV BP: 81.04 ML (ref 56–104)
ECHO LV EDV INDEX A4C: 40.5 ML/M2
ECHO LV EDV INDEX BP: 37.5 ML/M2
ECHO LV EDV NDEX A2C: 33.7 ML/M2
ECHO LV EJECTION FRACTION A2C: 63 PERCENT
ECHO LV EJECTION FRACTION A4C: 59 PERCENT
ECHO LV EJECTION FRACTION BIPLANE: 61 PERCENT (ref 55–100)
ECHO LV ESV A2C: 27.01 ML
ECHO LV ESV A4C: 36.31 ML
ECHO LV ESV BP: 31.61 ML (ref 19–49)
ECHO LV ESV INDEX A2C: 12.5 ML/M2
ECHO LV ESV INDEX A4C: 16.8 ML/M2
ECHO LV ESV INDEX BP: 14.6 ML/M2
ECHO LV GLOBAL LONGITUDINAL STRAIN (GLS): -17.6 PERCENT
ECHO LV INTERNAL DIMENSION DIASTOLIC: 4.27 CM (ref 3.9–5.3)
ECHO LV INTERNAL DIMENSION SYSTOLIC: 2.95 CM
ECHO LV IVSD: 1.07 CM (ref 0.6–0.9)
ECHO LV MASS 2D: 139 G (ref 67–162)
ECHO LV MASS INDEX 2D: 64.3 G/M2 (ref 43–95)
ECHO LV POSTERIOR WALL DIASTOLIC: 0.91 CM (ref 0.6–0.9)
ECHO LVOT PEAK GRADIENT: 3.55 MMHG
ECHO LVOT PEAK VELOCITY: 94.24 CM/S
ECHO LVOT VTI: 20.85 CM
ECHO MV A VELOCITY: 107.07 CM/S
ECHO MV AREA PHT: 3.12 CM2
ECHO MV E DECELERATION TIME (DT): 242.91 MS
ECHO MV E VELOCITY: 75.81 CM/S
ECHO MV E/A RATIO: 0.71
ECHO MV E/E' LATERAL: 6.74
ECHO MV E/E' RATIO (AVERAGED): 9.36
ECHO MV E/E' SEPTAL: 11.98
ECHO MV PRESSURE HALF TIME (PHT): 70.44 MS
ECHO RV TAPSE: 1.84 CM (ref 1.5–2)
GLOBAL LONGITUDINAL STRAIN 2 CHAMBER: -15.1 PERCENT
GLOBAL LONGITUDINAL STRAIN 4 CHAMBER: -18.2 PERCENT
GLOBAL LONGITUDINAL STRAIN LONG AXIS: -19.5 PERCENT
LA VOL DISK BP: 56.68 ML (ref 22–52)

## 2021-03-18 RX ORDER — ACETAMINOPHEN 325 MG/1
650 TABLET ORAL
Status: CANCELLED | OUTPATIENT
Start: 2021-03-23

## 2021-03-18 RX ORDER — EPINEPHRINE 1 MG/ML
0.3 INJECTION, SOLUTION, CONCENTRATE INTRAVENOUS AS NEEDED
Status: CANCELLED | OUTPATIENT
Start: 2021-03-23

## 2021-03-18 RX ORDER — DIPHENHYDRAMINE HYDROCHLORIDE 50 MG/ML
25 INJECTION, SOLUTION INTRAMUSCULAR; INTRAVENOUS AS NEEDED
Status: CANCELLED
Start: 2021-03-23

## 2021-03-18 RX ORDER — DIPHENHYDRAMINE HYDROCHLORIDE 50 MG/ML
50 INJECTION, SOLUTION INTRAMUSCULAR; INTRAVENOUS
Status: CANCELLED | OUTPATIENT
Start: 2021-03-23

## 2021-03-18 RX ORDER — ALBUTEROL SULFATE 0.83 MG/ML
2.5 SOLUTION RESPIRATORY (INHALATION) AS NEEDED
Status: CANCELLED
Start: 2021-03-23

## 2021-03-18 RX ORDER — ONDANSETRON 2 MG/ML
8 INJECTION INTRAMUSCULAR; INTRAVENOUS AS NEEDED
Status: CANCELLED | OUTPATIENT
Start: 2021-03-23

## 2021-03-18 RX ORDER — HYDROCORTISONE SODIUM SUCCINATE 100 MG/2ML
100 INJECTION, POWDER, FOR SOLUTION INTRAMUSCULAR; INTRAVENOUS AS NEEDED
Status: CANCELLED | OUTPATIENT
Start: 2021-03-23

## 2021-03-18 RX ORDER — DIPHENHYDRAMINE HYDROCHLORIDE 50 MG/ML
50 INJECTION, SOLUTION INTRAMUSCULAR; INTRAVENOUS AS NEEDED
Status: CANCELLED
Start: 2021-03-23

## 2021-03-18 RX ORDER — ACETAMINOPHEN 325 MG/1
650 TABLET ORAL AS NEEDED
Status: CANCELLED
Start: 2021-03-23

## 2021-03-23 ENCOUNTER — OFFICE VISIT (OUTPATIENT)
Dept: ONCOLOGY | Age: 71
End: 2021-03-23
Payer: MEDICARE

## 2021-03-23 ENCOUNTER — HOSPITAL ENCOUNTER (OUTPATIENT)
Dept: INFUSION THERAPY | Age: 71
Discharge: HOME | End: 2021-03-23
Payer: MEDICARE

## 2021-03-23 VITALS
SYSTOLIC BLOOD PRESSURE: 133 MMHG | DIASTOLIC BLOOD PRESSURE: 75 MMHG | HEIGHT: 67 IN | HEART RATE: 69 BPM | OXYGEN SATURATION: 99 % | WEIGHT: 238.8 LBS | RESPIRATION RATE: 18 BRPM | TEMPERATURE: 96 F | BODY MASS INDEX: 37.48 KG/M2

## 2021-03-23 VITALS
HEIGHT: 67 IN | DIASTOLIC BLOOD PRESSURE: 81 MMHG | RESPIRATION RATE: 18 BRPM | OXYGEN SATURATION: 99 % | BODY MASS INDEX: 36.65 KG/M2 | TEMPERATURE: 98.3 F | HEART RATE: 69 BPM | SYSTOLIC BLOOD PRESSURE: 146 MMHG

## 2021-03-23 DIAGNOSIS — C50.911 BILATERAL MALIGNANT NEOPLASM OF BREAST IN FEMALE, ESTROGEN RECEPTOR POSITIVE, UNSPECIFIED SITE OF BREAST (HCC): Primary | ICD-10-CM

## 2021-03-23 DIAGNOSIS — Z17.0 BILATERAL MALIGNANT NEOPLASM OF BREAST IN FEMALE, ESTROGEN RECEPTOR POSITIVE, UNSPECIFIED SITE OF BREAST (HCC): Primary | ICD-10-CM

## 2021-03-23 DIAGNOSIS — E11.9 TYPE 2 DIABETES MELLITUS WITHOUT COMPLICATION, WITHOUT LONG-TERM CURRENT USE OF INSULIN (HCC): ICD-10-CM

## 2021-03-23 DIAGNOSIS — G62.0 NEUROPATHY DUE TO CHEMOTHERAPEUTIC DRUG (HCC): ICD-10-CM

## 2021-03-23 DIAGNOSIS — C50.912 BILATERAL MALIGNANT NEOPLASM OF BREAST IN FEMALE, ESTROGEN RECEPTOR POSITIVE, UNSPECIFIED SITE OF BREAST (HCC): Primary | ICD-10-CM

## 2021-03-23 DIAGNOSIS — Z01.89 ENCOUNTER FOR OTHER SPECIFIED SPECIAL EXAMINATIONS: ICD-10-CM

## 2021-03-23 DIAGNOSIS — T45.1X5A NEUROPATHY DUE TO CHEMOTHERAPEUTIC DRUG (HCC): ICD-10-CM

## 2021-03-23 PROCEDURE — 77030012965 HC NDL HUBR BBMI -A

## 2021-03-23 PROCEDURE — G8427 DOCREV CUR MEDS BY ELIG CLIN: HCPCS | Performed by: INTERNAL MEDICINE

## 2021-03-23 PROCEDURE — 74011000258 HC RX REV CODE- 258: Performed by: INTERNAL MEDICINE

## 2021-03-23 PROCEDURE — 74011250636 HC RX REV CODE- 250/636: Performed by: INTERNAL MEDICINE

## 2021-03-23 PROCEDURE — 2022F DILAT RTA XM EVC RTNOPTHY: CPT | Performed by: INTERNAL MEDICINE

## 2021-03-23 PROCEDURE — G8536 NO DOC ELDER MAL SCRN: HCPCS | Performed by: INTERNAL MEDICINE

## 2021-03-23 PROCEDURE — 96413 CHEMO IV INFUSION 1 HR: CPT

## 2021-03-23 PROCEDURE — 1090F PRES/ABSN URINE INCON ASSESS: CPT | Performed by: INTERNAL MEDICINE

## 2021-03-23 PROCEDURE — 3017F COLORECTAL CA SCREEN DOC REV: CPT | Performed by: INTERNAL MEDICINE

## 2021-03-23 PROCEDURE — 99214 OFFICE O/P EST MOD 30 MIN: CPT | Performed by: INTERNAL MEDICINE

## 2021-03-23 PROCEDURE — G0463 HOSPITAL OUTPT CLINIC VISIT: HCPCS | Performed by: INTERNAL MEDICINE

## 2021-03-23 PROCEDURE — G8417 CALC BMI ABV UP PARAM F/U: HCPCS | Performed by: INTERNAL MEDICINE

## 2021-03-23 PROCEDURE — G8400 PT W/DXA NO RESULTS DOC: HCPCS | Performed by: INTERNAL MEDICINE

## 2021-03-23 PROCEDURE — 1101F PT FALLS ASSESS-DOCD LE1/YR: CPT | Performed by: INTERNAL MEDICINE

## 2021-03-23 PROCEDURE — 3046F HEMOGLOBIN A1C LEVEL >9.0%: CPT | Performed by: INTERNAL MEDICINE

## 2021-03-23 PROCEDURE — G8432 DEP SCR NOT DOC, RNG: HCPCS | Performed by: INTERNAL MEDICINE

## 2021-03-23 RX ORDER — SODIUM CHLORIDE 0.9 % (FLUSH) 0.9 %
10 SYRINGE (ML) INJECTION AS NEEDED
Status: DISPENSED | OUTPATIENT
Start: 2021-03-23 | End: 2021-03-23

## 2021-03-23 RX ORDER — DULOXETIN HYDROCHLORIDE 30 MG/1
30 CAPSULE, DELAYED RELEASE ORAL DAILY
Qty: 30 CAP | Refills: 5 | Status: SHIPPED | OUTPATIENT
Start: 2021-03-23 | End: 2021-10-12

## 2021-03-23 RX ORDER — SODIUM CHLORIDE 9 MG/ML
25 INJECTION, SOLUTION INTRAVENOUS CONTINUOUS
Status: DISCONTINUED | OUTPATIENT
Start: 2021-03-23 | End: 2021-03-24 | Stop reason: HOSPADM

## 2021-03-23 RX ORDER — SODIUM CHLORIDE 9 MG/ML
10 INJECTION INTRAMUSCULAR; INTRAVENOUS; SUBCUTANEOUS AS NEEDED
Status: ACTIVE | OUTPATIENT
Start: 2021-03-23 | End: 2021-03-23

## 2021-03-23 RX ORDER — HEPARIN 100 UNIT/ML
300-500 SYRINGE INTRAVENOUS AS NEEDED
Status: ACTIVE | OUTPATIENT
Start: 2021-03-23 | End: 2021-03-23

## 2021-03-23 RX ADMIN — SODIUM CHLORIDE 25 ML/HR: 900 INJECTION, SOLUTION INTRAVENOUS at 11:30

## 2021-03-23 RX ADMIN — Medication 500 UNITS: at 12:15

## 2021-03-23 RX ADMIN — Medication 10 ML: at 12:15

## 2021-03-23 RX ADMIN — TRASTUZUMAB 630 MG: 150 INJECTION, POWDER, LYOPHILIZED, FOR SOLUTION INTRAVENOUS at 11:41

## 2021-03-23 NOTE — PROGRESS NOTES
[facility] at [facility]  [address] [facility] : [phone]  F: [phone]      Reason for Visit:   Kimber Alvarez is a 79 y.o. female who is seen in consultation at the request of Dr. Heriberto Santos for evaluation of therapy for breast cancer. Rad onc:  Dr. Hope Krishnamurthy    Treatment History:   · 1/24/20 right breast mass:  9:00 core bx, IMC, gr 1, 5 mm, ER + at 99%, FL + at 97%, HER 2 negative at IHC 0, ki67 8%; left breast mass: 3:00 core bx, IMC, gr 3,  5 mm, ER + at 98%, FL negative, HER 2 POSITIVE at [facility] 3+  · invitae genetic testing:  BRCA2 and RAD50 VUS  · 3/23/20 right breast lumpectomy:  ILC, 7 mm, gr 1, no LVI, 0/8 LN, pT1b pN0 cMo; left breast lumpectomy:  IDC, 1.1 cm, gr 3, 0/2 LN, DCIS present, no LVI, pT1c pN0 cM0  · Paclitaxel/trastuzumab 4/21/20-7/7/2020  · Outback Herceptin 7/14/2020-3/23/21  · XRT to left breast 8/5/2020-9/8/2020  · Anastrozole 9/16/2020    History of Present Illness: An abnormal mammogram led to the pathology above    Interval history:  In today for follow up and treatment. Experiencing gr 1 hot flashes, gr 1 neuropathy in her fingers, feet and toes. Mouth sores have resolved    FH: No breast or ovarian cancer, no prostate or pancreas cancer    Past Medical History:   Diagnosis Date    Cancer [facility])       History reviewed. No pertinent surgical history. Social History     Tobacco Use    Smoking status: Never Smoker    Smokeless tobacco: Never Used   Substance Use Topics    Alcohol use: Not Currently      History reviewed. No pertinent family history. Current Outpatient Medications   Medication Sig    carvediloL (COREG) 6.25 mg tablet TAKE 1 TABLET BY MOUTH TWICE DAILY WITH MEALS    metFORMIN (GLUCOPHAGE) 500 mg tablet Take  by mouth two (2) times daily (with meals).  anastrozole (ARIMIDEX) 1 mg tablet Take 1 mg by mouth daily.     ergocalciferol (ERGOCALCIFEROL) 1,250 mcg (50,000 unit) capsule TK 1 C PO 1 TIME A WK    losartan (COZAAR) 50 mg tablet TK 1 T PO QD    omeprazole (PRILOSEC) 20 mg capsule TK 1 C PO QD    rosuvastatin (CRESTOR) 20 mg tablet TK 1 T PO QD    aspirin delayed-release 81 mg tablet Take  by mouth daily.  lidocaine-prilocaine (EMLA) topical cream Apply  to affected area as needed for Pain. No current facility-administered medications for this visit. No Known Allergies     Review of Systems: A complete review of systems was obtained, negative except as described above. Physical Exam:     Visit Vitals  BP (!) 146/81   Pulse 69   Temp 98.3 °F (36.8 °C) (Temporal)   Resp 18   Ht 5' 7\" (1.702 m)   SpO2 99%   BMI 37.40 kg/m²     General: No distress  Eyes: Anicteric sclerae  HENT: Atraumatic  Neck: Supple  Respiratory: Normal respiratory effort  CV: No peripheral edema  GI: nondistended  Skin: No rashes, ecchymoses, or petechiae  Psych: Alert, oriented, appropriate affect, normal judgment/insight      Results:     Lab Results   Component Value Date/Time    WBC 5.0 07/07/2020 10:16 AM    HGB 11.3 (L) 07/07/2020 10:16 AM    HCT 32.6 (L) 07/07/2020 10:16 AM    PLATELET 521 82/67/8083 10:16 AM    MCV 90.8 07/07/2020 10:16 AM    ABS. NEUTROPHILS 2.7 07/07/2020 10:16 AM     Lab Results   Component Value Date/Time    Sodium 139 01/19/2021 11:36 AM    Potassium 3.9 01/19/2021 11:36 AM    Chloride 108 01/19/2021 11:36 AM    CO2 29 01/19/2021 11:36 AM    Glucose 100 01/19/2021 11:36 AM    BUN 10 01/19/2021 11:36 AM    Creatinine 1.00 01/19/2021 11:36 AM    GFR est AA >60 01/19/2021 11:36 AM    GFR est non-AA 55 (L) 01/19/2021 11:36 AM    Calcium 9.0 01/19/2021 11:36 AM     Lab Results   Component Value Date/Time    Bilirubin, total 0.5 01/19/2021 11:36 AM    ALT (SGPT) 23 01/19/2021 11:36 AM    Alk.  phosphatase 109 01/19/2021 11:36 AM    Protein, total 7.2 01/19/2021 11:36 AM    Albumin 3.4 (L) 01/19/2021 11:36 AM    Globulin 3.8 01/19/2021 11:36 AM       2/11/20 MRI breast  Left:  11 mm mass at 3:00  Right:  9:00, 5 mm mass  No LAD    4/20/20 TTE EF 63%    9/8/20 dexa  L1-4 T 0.4  L fem neck T 0.3  R fem neck T -0.4    Records reviewed and summarized above. Pathology report(s) reviewed above. Radiology report(s) reviewed above. Assessment/plan:   1. Left breast IDC, 1.1 cm, 0/2 LN, ER +, [place] negative, HER 2 POSITIVE, gr 3:  Stage IA (both anatomic and prognostic)    We explained to the patient that the goal of systemic adjuvant therapy is to improve the chances for cure and decrease the risk of relapse. We explained why a patient can have microscopic cancer spread now even though physical examination, laboratory studies and imaging studies are negative for cancer. We explained that the same treatments used now as adjuvant or preventive treatments rarely if ever are curative in women who develop metastases. Using eprognosis. org, her 5 year all cause mortality risk is only 6-8%; her 10 year all cause mortality risk is 15-23%; her median OS is 17-21 years. An adjuvant conversation is warranted. Discussed weekly paclitaxel 80 mg/m2 with weekly trastuzumab 4 mg/kg load then 2 mg/kg weekly x 12 followed by completion of a year of trastuzumab 6 mg/kg q 3 weeks. This is APT study. I discussed the potential risks of paclitaxel chemotherapy with the patient. Major toxicities include nausea and vomiting, stomatitis, fatigue. Anemia frequently results. Neutropenic fever is uncommon, but can be a life-threatening problem. We provided the patient with detailed information concerning toxicity including frequent toxicities that only last a few days, such as nausea, vomiting, mouth sores, arthralgia, myalgia, and potentially allergic reactions to paclitaxel, as well as toxicities which can be longer lasting including fatigue, anemia and neuropathy. We provided the patient with detailed information concerning the toxicities of their regimen in addition to our verbal discussion.     Rationale for therapy with trastuzumab was also discussed with the patient including a 50% proportional improvement in disease free survival and also an improvement in overall survival in patients receiving trastuzumab and chemotherapy for HER-2 positive breast cancer. The side effects of trastuzumab were discussed including a 4%-5% risk of dropping her ejection fraction while on treatment and about a 1% risk of CHF. We discussed that this drug will be used every 3 weeks for remainder of a year following the chemotherapy cycles. We will check her EF before chemotherapy and every 3 months while she is receiving trastuzumab. After this discussion, she is agreeable to paclitaxel and trastuzumab as above. She has signed informed consent. TTE on 4/20/2020, EF 63%, TTE on 7/13/2020, 63%, 7% decline in GLS, TTE on 9/9/2020, EF 61% with 7.7% decline in GLS since 7/2020 and 14.7% decline in GLS since 4/2020. Repeat TTE on 10/16/2020, EF 62%, GLS 17.7%, stable since last TTE, TTE on 12/11/2020, EF 64%, GLS stable; 3/12/21, TTE EF 61%, GLS stable    Outback Herceptin 13/13 today. Dr. Rafi Gutiérrez has placed port, she will call Dr. Rafi Gutiérrez for port removal    rx for zofran, compazine, emla cream    Following chemotherapy, she is an excellent candidate for radiation and endocrine therapy. Started XRT on 8/5/2020 with Dr. Derek Finney and completed XRT on 9/8/2020. patient is agreeable to Anastrozole 1 mg daily, rx in. Completed xrt on 9/8/2020, started anastrozole on 9/16/2020. No indication for neratinib    3/4/21 normal mammogram with Dr. Rafi Gutiérrez, repeat in 1 year and she will get a MRI in 6 months    2. Right breast ILC, 7 mm, ER +, IA +, HER 2 negative, gr 1, 0/8 LN, stage IA both anatomic and prognostic    We explained to the patient that the goal of systemic adjuvant therapy is to improve the chances for cure and decrease the risk of relapse.  We explained why a patient can have microscopic cancer spread now even though physical examination, laboratory studies and imaging studies are negative for cancer. We explained that the same treatments used now as adjuvant or preventive treatments rarely if ever are curative in women who develop metastases. Recommend endocrine therapy, see #1 above. While she does not quite meet the criteria on this side for CALGB 9343 (74 yo instead of 70), she does meet KIT guidelines for possibly avoiding XRT on this side, and did so    This is a separate cancer    3. Emotional well being:  She has excellent support and is coping well with her disease    4. BRCA2 and RAD50 VUS:  Not clinically significant at this time    5. Obesity:  Will monitor    6. 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 Gr 1. Discussed starting Cymbalta, she is agreeable, will start 30 mg daily rx in, side effect from therapy    9. Decrease in GLS:  14.7% decline in GLS since 4/2020. LVEF stable. ProBNP and Trop normal.  Will start Coreg 3.125 BID, rx in. Discussed with enzo Morrison to continue to resume treatment since troponin and BNP were normal.  Repeat TTE on 10/16/2020 and 12/11/20 and 3/12/21 stable; next one due in 6 months, ordered    10. HM:  Colonoscopy in 10/2020. 11. DM2:  On metformin         I appreciate the opportunity to participate in [facility]ameka Cao silverio. Signed By: Pedro Ruiz MD      No orders of the defined types were placed in this encounter.

## 2021-03-23 NOTE — PROGRESS NOTES
[facility] Progress Note    Date: 2021    Name: Nereyda Louis    MRN: [id number]         : 1950    Ms. Deshaun Calderon Arrived ambulatory and in no distress for C17D1 of Herceptin Regimen. Assessment was completed, no acute issues at this time, no new complaints voiced. Right chest wall port accessed without difficulty, labs drawn & sent for processing. Chemotherapy Flowsheet 3/2/2021   Cycle C16D1   Date 3/2/2021   Drug / Regimen Herceptin   Post Hydration -   Pre Meds -   Notes given        Patient proceed to appointment with Dr. Teetee Gil. Ms. Daniel Acuña vitals were reviewed. Visit Vitals  /75 (BP 1 Location: Left lower arm, BP Patient Position: Sitting)   Pulse 69   Temp (!) 96 °F (35.6 °C)   Resp 18   Ht 5' 7\" (1.702 m)   Wt 108.3 kg (238 lb 12.8 oz)   SpO2 99%   Breastfeeding No   BMI 37.40 kg/m²       Lab results were obtained and reviewed. No results found for this or any previous visit (from the past 12 hour(s)). Medications:  Medications Administered     0.9% sodium chloride infusion     Admin Date  2021 Action  New Bag Dose  25 mL/hr Rate  25 mL/hr Route  IntraVENous Administered By  Rylan Kelly RN          heparin (porcine) pf 300-500 Units     Admin Date  2021 Action  Given Dose  500 Units Route  InterCATHeter Administered By  Rylan Kelly RN          saline peripheral flush soln 10 mL     Admin Date  2021 Action  Given Dose  10 mL Route  InterCATHeter Administered By  Rylan Kelly RN          trastuzumab (HERCEPTIN) 630 mg in 0.9% sodium chloride 250 mL, overfill volume 25 mL IVPB     Admin Date  2021 Action  New Bag Dose  630 mg Rate  610 mL/hr Route  IntraVENous Administered By  Rylan Kelly RN                  Ms. Deshaun Calderon tolerated treatment well and was discharged from [place] [address] in stable condition. Port de-accessed, flushed & heparinized per protocol.  She is to return on  at 0900 for her next appointment.     Ryan Ruff RN  March 23, 2021

## 2021-04-13 ENCOUNTER — HOSPITAL ENCOUNTER (OUTPATIENT)
Dept: INFUSION THERAPY | Age: 71
End: 2021-04-13

## 2021-06-28 RX ORDER — CARVEDILOL 6.25 MG/1
TABLET ORAL
Qty: 60 TABLET | Refills: 3 | Status: SHIPPED | OUTPATIENT
Start: 2021-06-28 | End: 2021-10-28

## 2021-10-12 ENCOUNTER — OFFICE VISIT (OUTPATIENT)
Dept: ONCOLOGY | Age: 71
End: 2021-10-12
Payer: MEDICARE

## 2021-10-12 VITALS
RESPIRATION RATE: 18 BRPM | HEART RATE: 80 BPM | TEMPERATURE: 97.8 F | SYSTOLIC BLOOD PRESSURE: 140 MMHG | OXYGEN SATURATION: 92 % | DIASTOLIC BLOOD PRESSURE: 79 MMHG | HEIGHT: 67 IN | BODY MASS INDEX: 36.79 KG/M2 | WEIGHT: 234.4 LBS

## 2021-10-12 DIAGNOSIS — Z79.899 ENCOUNTER FOR MONITORING CARDIOTOXIC DRUG THERAPY: ICD-10-CM

## 2021-10-12 DIAGNOSIS — Z51.81 ENCOUNTER FOR MONITORING CARDIOTOXIC DRUG THERAPY: ICD-10-CM

## 2021-10-12 DIAGNOSIS — C50.912 BILATERAL MALIGNANT NEOPLASM OF BREAST IN FEMALE, ESTROGEN RECEPTOR POSITIVE, UNSPECIFIED SITE OF BREAST (HCC): Primary | ICD-10-CM

## 2021-10-12 DIAGNOSIS — Z17.0 BILATERAL MALIGNANT NEOPLASM OF BREAST IN FEMALE, ESTROGEN RECEPTOR POSITIVE, UNSPECIFIED SITE OF BREAST (HCC): Primary | ICD-10-CM

## 2021-10-12 DIAGNOSIS — C50.911 BILATERAL MALIGNANT NEOPLASM OF BREAST IN FEMALE, ESTROGEN RECEPTOR POSITIVE, UNSPECIFIED SITE OF BREAST (HCC): Primary | ICD-10-CM

## 2021-10-12 PROCEDURE — 1090F PRES/ABSN URINE INCON ASSESS: CPT | Performed by: INTERNAL MEDICINE

## 2021-10-12 PROCEDURE — G8417 CALC BMI ABV UP PARAM F/U: HCPCS | Performed by: INTERNAL MEDICINE

## 2021-10-12 PROCEDURE — G0463 HOSPITAL OUTPT CLINIC VISIT: HCPCS | Performed by: NURSE PRACTITIONER

## 2021-10-12 PROCEDURE — G8432 DEP SCR NOT DOC, RNG: HCPCS | Performed by: INTERNAL MEDICINE

## 2021-10-12 PROCEDURE — 99214 OFFICE O/P EST MOD 30 MIN: CPT | Performed by: INTERNAL MEDICINE

## 2021-10-12 PROCEDURE — 1101F PT FALLS ASSESS-DOCD LE1/YR: CPT | Performed by: INTERNAL MEDICINE

## 2021-10-12 PROCEDURE — 3017F COLORECTAL CA SCREEN DOC REV: CPT | Performed by: INTERNAL MEDICINE

## 2021-10-12 PROCEDURE — G8427 DOCREV CUR MEDS BY ELIG CLIN: HCPCS | Performed by: INTERNAL MEDICINE

## 2021-10-12 PROCEDURE — G8400 PT W/DXA NO RESULTS DOC: HCPCS | Performed by: INTERNAL MEDICINE

## 2021-10-12 PROCEDURE — G8536 NO DOC ELDER MAL SCRN: HCPCS | Performed by: INTERNAL MEDICINE

## 2021-10-12 RX ORDER — GABAPENTIN 100 MG/1
CAPSULE ORAL 3 TIMES DAILY
COMMUNITY

## 2021-10-12 NOTE — PROGRESS NOTES
[facility] at [place] [address]  [address] [facility], [address] [facility] [address] [address] [facility]  Agnes Butter: [phone]  F: [phone]      Reason for Visit:   Nahed Kerns is a 79 y.o. female who is seen in follow up for breast cancer. Breast Surgeon: Dr. Mardeen Kocher onc:  Dr. Florida Lowery    Treatment History:   · 1/24/20 right breast mass:  9:00 core bx, IMC, gr 1, 5 mm, ER + at 99%, [place] + at 97%, HER 2 negative at IHC 0, ki67 8%; left breast mass: 3:00 core bx, IMC, gr 3,  5 mm, ER + at 98%, [place] negative, HER 2 POSITIVE at [facility] 3+  · invitae genetic testing:  BRCA2 and RAD50 VUS  · 3/23/20 right breast lumpectomy:  ILC, 7 mm, gr 1, no LVI, 0/8 LN, pT1b pN0 cMo; left breast lumpectomy:  IDC, 1.1 cm, gr 3, 0/2 LN, DCIS present, no LVI, pT1c pN0 cM0  · Paclitaxel/trastuzumab 4/21/20-7/7/2020  · Outback Herceptin 7/14/2020-3/23/21  · XRT to left breast 8/5/2020-9/8/2020  · Anastrozole 9/16/2020 - current    History of Present Illness: An abnormal mammogram led to the pathology above    Interval history:  In today for follow up. Reports gr 1 hot flashes, gr 1 neuropathy, gr 1 edema. FH:  No breast or ovarian cancer, no prostate or pancreas cancer    Past Medical History:   Diagnosis Date    Cancer [facility])       History reviewed. No pertinent surgical history. Social History     Tobacco Use    Smoking status: Never Smoker    Smokeless tobacco: Never Used   Substance Use Topics    Alcohol use: Not Currently      History reviewed. No pertinent family history. Current Outpatient Medications   Medication Sig    gabapentin (NEURONTIN) 100 mg capsule Take  by mouth three (3) times daily.  anastrozole (ARIMIDEX) 1 mg tablet TAKE 1 TABLET BY MOUTH DAILY    carvediloL (COREG) 6.25 mg tablet TAKE 1 TABLET BY MOUTH TWICE DAILY WITH MEALS    metFORMIN (GLUCOPHAGE) 500 mg tablet Take  by mouth two (2) times daily (with meals).     ergocalciferol (ERGOCALCIFEROL) 1,250 mcg (50,000 unit) capsule TK 1 C PO 1 TIME A WK    losartan (COZAAR) 50 mg tablet TK 1 T PO QD    omeprazole (PRILOSEC) 20 mg capsule TK 1 C PO QD    rosuvastatin (CRESTOR) 20 mg tablet TK 1 T PO QD    aspirin delayed-release 81 mg tablet Take  by mouth daily.  lidocaine-prilocaine (EMLA) topical cream Apply  to affected area as needed for Pain.  DULoxetine (CYMBALTA) 30 mg capsule Take 1 Cap by mouth daily. (Patient not taking: Reported on 10/12/2021)     No current facility-administered medications for this visit. No Known Allergies     Review of Systems: A complete review of systems was obtained, negative except as described above. Physical Exam:     Visit Vitals  BP (!) 140/79   Pulse 80   Temp 97.8 °F (36.6 °C) (Temporal)   Resp 18   Ht 5' 7\" (1.702 m)   Wt 234 lb 6.4 oz (106.3 kg)   SpO2 92%   BMI 36.71 kg/m²     General: No distress  Eyes: Anicteric sclerae  HENT: Atraumatic  Neck: Supple  Respiratory: Normal respiratory effort  CV: No peripheral edema  GI: nondistended  Skin: No rashes, ecchymoses, or petechiae  Psych: Alert, oriented, appropriate affect, normal judgment/insight      Results:     Lab Results   Component Value Date/Time    WBC 5.0 07/07/2020 10:16 AM    HGB 11.3 (L) 07/07/2020 10:16 AM    HCT 32.6 (L) 07/07/2020 10:16 AM    PLATELET 549 42/65/1119 10:16 AM    MCV 90.8 07/07/2020 10:16 AM    ABS. NEUTROPHILS 2.7 07/07/2020 10:16 AM     Lab Results   Component Value Date/Time    Sodium 139 01/19/2021 11:36 AM    Potassium 3.9 01/19/2021 11:36 AM    Chloride 108 01/19/2021 11:36 AM    CO2 29 01/19/2021 11:36 AM    Glucose 100 01/19/2021 11:36 AM    BUN 10 01/19/2021 11:36 AM    Creatinine 1.00 01/19/2021 11:36 AM    GFR est AA >60 01/19/2021 11:36 AM    GFR est non-AA 55 (L) 01/19/2021 11:36 AM    Calcium 9.0 01/19/2021 11:36 AM     Lab Results   Component Value Date/Time    Bilirubin, total 0.5 01/19/2021 11:36 AM    ALT (SGPT) 23 01/19/2021 11:36 AM    Alk.  phosphatase 109 01/19/2021 11:36 AM    Protein, total 7.2 01/19/2021 11:36 AM    Albumin 3.4 (L) 01/19/2021 11:36 AM    Globulin 3.8 01/19/2021 11:36 AM       2/11/20 MRI breast  Left:  11 mm mass at 3:00  Right:  9:00, 5 mm mass  No LAD    4/20/20 TTE EF 63%    9/8/20 dexa  L1-4 T 0.4  L fem neck T 0.3  R fem neck T -0.4    Records reviewed and summarized above. Pathology report(s) reviewed above. Radiology report(s) reviewed above. Assessment/plan:   1. Left breast IDC, 1.1 cm, 0/2 LN, ER +, MT negative, HER 2 POSITIVE, gr 3:  Stage IA (both anatomic and prognostic)    We explained to the patient that the goal of systemic adjuvant therapy is to improve the chances for cure and decrease the risk of relapse. We explained why a patient can have microscopic cancer spread now even though physical examination, laboratory studies and imaging studies are negative for cancer. We explained that the same treatments used now as adjuvant or preventive treatments rarely if ever are curative in women who develop metastases. Using eprognosis. org, her 5 year all cause mortality risk is only 6-8%; her 10 year all cause mortality risk is 15-23%; her median OS is 17-21 years. An adjuvant conversation is warranted. TTE on 4/20/2020, EF 63%, TTE on 7/13/2020, 63%, 7% decline in GLS, TTE on 9/9/2020, EF 61% with 7.7% decline in GLS since 7/2020 and 14.7% decline in GLS since 4/2020. Repeat TTE on 10/16/2020, EF 62%, GLS 17.7%, stable since last TTE, TTE on 12/11/2020, EF 64%, GLS stable; 3/12/21, TTE EF 61%, GLS stable; will repeat TTE    Completed outback Herceptin 3/23/21. patient is agreeable to Anastrozole 1 mg daily, rx in. Completed xrt on 9/8/2020, started anastrozole on 9/16/2020, HARSH    No indication for neratinib    3/4/21 normal mammogram with Dr. Ellen Branch, repeat in 1 year. She saw Dr. Ellen Branch with breast MRI 9/2021, will request records.     2. Right breast ILC, 7 mm, ER +, MT +, HER 2 negative, gr 1, 0/8 LN, stage IA both anatomic and prognostic    We explained to the patient that the goal of systemic adjuvant therapy is to improve the chances for cure and decrease the risk of relapse. We explained why a patient can have microscopic cancer spread now even though physical examination, laboratory studies and imaging studies are negative for cancer. We explained that the same treatments used now as adjuvant or preventive treatments rarely if ever are curative in women who develop metastases. Recommend endocrine therapy, see #1 above. While she does not quite meet the criteria on this side for CALGB 9343 (74 yo instead of 70), she does meet KIT guidelines for possibly avoiding XRT on this side, and did so    This is a separate cancer    3. Emotional well being:  She has excellent support and is coping well with her disease    4. BRCA2 and RAD50 VUS:  Not clinically significant at this time    5. Obesity:  Will monitor    6. 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 Gr 1. Started Cymbalta with no improvement. Now taking gabapentin 100 mg BID    9. Decrease in GLS:  14.7% decline in GLS since 4/2020. LVEF stable. ProBNP and Trop normal.  Will start Coreg 3.125 BID, rx in. Discussed with enzo Salas to continue to resume treatment since troponin and BNP were normal.  Repeat TTE on 10/16/2020 and 12/11/20 and 3/12/21 stable; next one due in 6 months after therapy which was 9/2021, ordered today. 10.  HM:  Colonoscopy in 10/2020. 11. DM2:  On metformin    This patient was seen in conjunction with Gildardo Lamb NP. I personally reviewed the history and all points in the assessment and plan, and performed key points on the exam. Right breast cancer, ER + HER 2 +, on anastrozole, HARSH, doing well. TTE ordered. Continue gabapentin for neuropathy from chemotherapy. RTC 6 months        I appreciate the opportunity to participate in Ms. Manas Cavanaughyanique sawyer.     Signed By: Killian Howard MD      No orders of the defined types were placed in this encounter.

## 2021-10-12 NOTE — PROGRESS NOTES
Alvina Ramirez is a 79 y.o. female Follow up for the evaluation of breast cancer. 1. Have you been to the ER, urgent care clinic since your last visit? Hospitalized since your last visit? No    2. Have you seen or consulted any other health care providers outside of the [address] [place] [address] since your last visit? Include any pap smears or colon screening.  No

## 2021-10-27 ENCOUNTER — ANCILLARY PROCEDURE (OUTPATIENT)
Dept: CARDIOLOGY CLINIC | Age: 71
End: 2021-10-27
Payer: MEDICARE

## 2021-10-27 VITALS
SYSTOLIC BLOOD PRESSURE: 130 MMHG | HEIGHT: 67 IN | WEIGHT: 234 LBS | BODY MASS INDEX: 36.73 KG/M2 | DIASTOLIC BLOOD PRESSURE: 72 MMHG

## 2021-10-27 DIAGNOSIS — Z17.0 BILATERAL MALIGNANT NEOPLASM OF BREAST IN FEMALE, ESTROGEN RECEPTOR POSITIVE, UNSPECIFIED SITE OF BREAST (HCC): ICD-10-CM

## 2021-10-27 DIAGNOSIS — Z79.899 ENCOUNTER FOR MONITORING CARDIOTOXIC DRUG THERAPY: ICD-10-CM

## 2021-10-27 DIAGNOSIS — Z51.81 ENCOUNTER FOR MONITORING CARDIOTOXIC DRUG THERAPY: ICD-10-CM

## 2021-10-27 DIAGNOSIS — C50.912 BILATERAL MALIGNANT NEOPLASM OF BREAST IN FEMALE, ESTROGEN RECEPTOR POSITIVE, UNSPECIFIED SITE OF BREAST (HCC): ICD-10-CM

## 2021-10-27 DIAGNOSIS — C50.911 BILATERAL MALIGNANT NEOPLASM OF BREAST IN FEMALE, ESTROGEN RECEPTOR POSITIVE, UNSPECIFIED SITE OF BREAST (HCC): ICD-10-CM

## 2021-10-27 LAB
ECHO AO ROOT DIAM: 2.81 CM
ECHO AV MEAN GRADIENT: 7.9 MMHG
ECHO AV PEAK GRADIENT: 14.34 MMHG
ECHO AV PEAK VELOCITY: 189.33 CM/S
ECHO AV VTI: 41.87 CM
ECHO LA AREA 4C: 17.2 CM2
ECHO LA MAJOR AXIS: 3.51 CM
ECHO LA MINOR AXIS: 1.63 CM
ECHO LA VOL 2C: 48.9 ML (ref 22–52)
ECHO LA VOL 4C: 42.05 ML (ref 22–52)
ECHO LA VOL BP: 49.64 ML (ref 22–52)
ECHO LA VOL/BSA BIPLANE: 22.98 ML/M2 (ref 16–28)
ECHO LA VOLUME INDEX A2C: 22.64 ML/M2 (ref 16–28)
ECHO LA VOLUME INDEX A4C: 19.47 ML/M2 (ref 16–28)
ECHO LV E' LATERAL VELOCITY: 11.91 CM/S
ECHO LV E' SEPTAL VELOCITY: 7.35 CM/S
ECHO LV EDV A2C: 74.26 ML
ECHO LV EDV A4C: 72.68 ML
ECHO LV EDV BP: 73.36 ML (ref 56–104)
ECHO LV EDV INDEX A4C: 33.6 ML/M2
ECHO LV EDV INDEX BP: 34 ML/M2
ECHO LV EDV NDEX A2C: 34.4 ML/M2
ECHO LV EJECTION FRACTION A2C: 64 PERCENT
ECHO LV EJECTION FRACTION A4C: 62 PERCENT
ECHO LV EJECTION FRACTION BIPLANE: 61.5 PERCENT (ref 55–100)
ECHO LV ESV A2C: 26.78 ML
ECHO LV ESV A4C: 27.91 ML
ECHO LV ESV BP: 28.28 ML (ref 19–49)
ECHO LV ESV INDEX A2C: 12.4 ML/M2
ECHO LV ESV INDEX A4C: 12.9 ML/M2
ECHO LV ESV INDEX BP: 13.1 ML/M2
ECHO LV GLOBAL LONGITUDINAL STRAIN (GLS): -18.2 PERCENT
ECHO LV INTERNAL DIMENSION DIASTOLIC: 3.86 CM (ref 3.9–5.3)
ECHO LV INTERNAL DIMENSION SYSTOLIC: 2.7 CM
ECHO LV IVSD: 1.08 CM (ref 0.6–0.9)
ECHO LV MASS 2D: 133.4 G (ref 67–162)
ECHO LV MASS INDEX 2D: 61.7 G/M2 (ref 43–95)
ECHO LV POSTERIOR WALL DIASTOLIC: 1.07 CM (ref 0.6–0.9)
ECHO LVOT PEAK GRADIENT: 3.33 MMHG
ECHO LVOT PEAK VELOCITY: 91.23 CM/S
ECHO LVOT VTI: 18.9 CM
ECHO MV A VELOCITY: 105.75 CM/S
ECHO MV AREA PHT: 3.98 CM2
ECHO MV E DECELERATION TIME (DT): 190.8 MS
ECHO MV E VELOCITY: 84.24 CM/S
ECHO MV E/A RATIO: 0.8
ECHO MV E/E' LATERAL: 7.07
ECHO MV E/E' RATIO (AVERAGED): 9.27
ECHO MV E/E' SEPTAL: 11.46
ECHO MV PRESSURE HALF TIME (PHT): 55.33 MS
ECHO RV INTERNAL DIMENSION: 2.71 CM
ECHO RV TAPSE: 1.78 CM (ref 1.5–2)
GLOBAL LONGITUDINAL STRAIN 2 CHAMBER: -18.7 PERCENT
GLOBAL LONGITUDINAL STRAIN 4 CHAMBER: -17.1 PERCENT
GLOBAL LONGITUDINAL STRAIN LONG AXIS: -18.9 PERCENT
LA VOL DISK BP: 45.2 ML (ref 22–52)

## 2021-10-27 PROCEDURE — 93306 TTE W/DOPPLER COMPLETE: CPT | Performed by: INTERNAL MEDICINE

## 2021-10-28 RX ORDER — CARVEDILOL 6.25 MG/1
TABLET ORAL
Qty: 60 TABLET | Refills: 3 | Status: SHIPPED | OUTPATIENT
Start: 2021-10-28 | End: 2022-03-03

## 2021-10-29 ENCOUNTER — TELEPHONE (OUTPATIENT)
Dept: ONCOLOGY | Age: 71
End: 2021-10-29

## 2021-10-29 NOTE — TELEPHONE ENCOUNTER
[facility] [place]  [phone]    10/29/2021 at 8:22 am--This user called and spoke with the patient and verified patient ID [id number], I let her know that her Echo was stable/good. Patient verbalized understanding and did not have any question's or concerns at that time. ----- Message from Tanner Ortega NP sent at 10/29/2021  8:16 AM EDT -----  Please let her know this is stable/good.

## 2022-03-03 RX ORDER — CARVEDILOL 6.25 MG/1
TABLET ORAL
Qty: 60 TABLET | Refills: 3 | Status: SHIPPED | OUTPATIENT
Start: 2022-03-03 | End: 2022-05-26

## 2022-03-19 PROBLEM — C50.911 BILATERAL MALIGNANT NEOPLASM OF BREAST IN FEMALE, ESTROGEN RECEPTOR POSITIVE (HCC): Status: ACTIVE | Noted: 2020-04-14

## 2022-03-19 PROBLEM — Z17.0 BILATERAL MALIGNANT NEOPLASM OF BREAST IN FEMALE, ESTROGEN RECEPTOR POSITIVE (HCC): Status: ACTIVE | Noted: 2020-04-14

## 2022-03-19 PROBLEM — C50.912 BILATERAL MALIGNANT NEOPLASM OF BREAST IN FEMALE, ESTROGEN RECEPTOR POSITIVE (HCC): Status: ACTIVE | Noted: 2020-04-14

## 2022-04-19 ENCOUNTER — OFFICE VISIT (OUTPATIENT)
Dept: ONCOLOGY | Age: 72
End: 2022-04-19
Payer: MEDICARE

## 2022-04-19 VITALS
HEART RATE: 76 BPM | OXYGEN SATURATION: 94 % | TEMPERATURE: 98 F | DIASTOLIC BLOOD PRESSURE: 96 MMHG | BODY MASS INDEX: 36.65 KG/M2 | HEIGHT: 67 IN | SYSTOLIC BLOOD PRESSURE: 145 MMHG | RESPIRATION RATE: 18 BRPM

## 2022-04-19 DIAGNOSIS — Z17.0 BILATERAL MALIGNANT NEOPLASM OF BREAST IN FEMALE, ESTROGEN RECEPTOR POSITIVE, UNSPECIFIED SITE OF BREAST (HCC): Primary | ICD-10-CM

## 2022-04-19 DIAGNOSIS — Z79.811 AROMATASE INHIBITOR USE: ICD-10-CM

## 2022-04-19 DIAGNOSIS — C50.912 BILATERAL MALIGNANT NEOPLASM OF BREAST IN FEMALE, ESTROGEN RECEPTOR POSITIVE, UNSPECIFIED SITE OF BREAST (HCC): Primary | ICD-10-CM

## 2022-04-19 DIAGNOSIS — C50.911 BILATERAL MALIGNANT NEOPLASM OF BREAST IN FEMALE, ESTROGEN RECEPTOR POSITIVE, UNSPECIFIED SITE OF BREAST (HCC): Primary | ICD-10-CM

## 2022-04-19 DIAGNOSIS — Z78.0 POST-MENOPAUSAL: ICD-10-CM

## 2022-04-19 PROCEDURE — 1101F PT FALLS ASSESS-DOCD LE1/YR: CPT | Performed by: INTERNAL MEDICINE

## 2022-04-19 PROCEDURE — G0463 HOSPITAL OUTPT CLINIC VISIT: HCPCS | Performed by: INTERNAL MEDICINE

## 2022-04-19 PROCEDURE — 3017F COLORECTAL CA SCREEN DOC REV: CPT | Performed by: INTERNAL MEDICINE

## 2022-04-19 PROCEDURE — G8536 NO DOC ELDER MAL SCRN: HCPCS | Performed by: INTERNAL MEDICINE

## 2022-04-19 PROCEDURE — 1090F PRES/ABSN URINE INCON ASSESS: CPT | Performed by: INTERNAL MEDICINE

## 2022-04-19 PROCEDURE — 99214 OFFICE O/P EST MOD 30 MIN: CPT | Performed by: INTERNAL MEDICINE

## 2022-04-19 PROCEDURE — G8417 CALC BMI ABV UP PARAM F/U: HCPCS | Performed by: INTERNAL MEDICINE

## 2022-04-19 PROCEDURE — G8427 DOCREV CUR MEDS BY ELIG CLIN: HCPCS | Performed by: INTERNAL MEDICINE

## 2022-04-19 PROCEDURE — G8510 SCR DEP NEG, NO PLAN REQD: HCPCS | Performed by: INTERNAL MEDICINE

## 2022-04-19 PROCEDURE — G8400 PT W/DXA NO RESULTS DOC: HCPCS | Performed by: INTERNAL MEDICINE

## 2022-04-19 NOTE — PROGRESS NOTES
[facility]cer [place] at [facility]  [address] [facility] Garland Sabot: [phone]  F: [phone]      Reason for Visit:   Saloni Hannon is a 70 y.o. female who is seen in follow up for breast cancer. Breast Surgeon: Dr. Keith Doss onc:  Dr. Jaylene Mixon    Treatment History:   · 1/24/20 right breast mass:  9:00 core bx, IMC, gr 1, 5 mm, ER + at 99%, MI + at 97%, HER 2 negative at IHC 0, ki67 8%; left breast mass: 3:00 core bx, IMC, gr 3,  5 mm, ER + at 98%, MI negative, HER 2 POSITIVE at [facility] 3+  · invitae genetic testing:  BRCA2 and RAD50 VUS  · 3/23/20 right breast lumpectomy:  ILC, 7 mm, gr 1, no LVI, 0/8 LN, pT1b pN0 cMo; left breast lumpectomy:  IDC, 1.1 cm, gr 3, 0/2 LN, DCIS present, no LVI, pT1c pN0 cM0  · Paclitaxel/trastuzumab 4/21/20-7/7/2020  · Outback Herceptin 7/14/2020-3/23/21  · XRT to left breast 8/5/2020-9/8/2020  · Anastrozole 9/16/2020 - current    History of Present Illness: An abnormal mammogram led to the pathology above    Interval history:  In today for follow up on anastrozole. Reports gr 1 hot flashes. She states she has a \"knot\" to left breast that she thinks has gotten larger. Mammo 3/9/22 normal.     FH:  No breast or ovarian cancer, no prostate or pancreas cancer    Past Medical History:   Diagnosis Date    Cancer [facility])       History reviewed. No pertinent surgical history. Social History     Tobacco Use    Smoking status: Never Smoker    Smokeless tobacco: Never Used   Substance Use Topics    Alcohol use: Not Currently      History reviewed. No pertinent family history. Current Outpatient Medications   Medication Sig    carvediloL (COREG) 6.25 mg tablet TAKE 1 TABLET BY MOUTH TWICE DAILY WITH MEALS    gabapentin (NEURONTIN) 100 mg capsule Take  by mouth three (3) times daily.     anastrozole (ARIMIDEX) 1 mg tablet TAKE 1 TABLET BY MOUTH DAILY    metFORMIN (GLUCOPHAGE) 500 mg tablet Take  by mouth two (2) times daily (with meals).  ergocalciferol (ERGOCALCIFEROL) 1,250 mcg (50,000 unit) capsule TK 1 C PO 1 TIME A WK    losartan (COZAAR) 50 mg tablet TK 1 T PO QD    omeprazole (PRILOSEC) 20 mg capsule TK 1 C PO QD    rosuvastatin (CRESTOR) 20 mg tablet TK 1 T PO QD    aspirin delayed-release 81 mg tablet Take  by mouth daily.  lidocaine-prilocaine (EMLA) topical cream Apply  to affected area as needed for Pain. (Patient not taking: Reported on 4/19/2022)     No current facility-administered medications for this visit. No Known Allergies     Review of Systems: A complete review of systems was obtained, negative except as described above. Physical Exam:     Visit Vitals  BP (!) 145/96 Comment: Pt. did not take BP meds yet and has no symptoms. Pulse 76   Temp 98 °F (36.7 °C) (Temporal)   Resp 18   Ht 5' 7\" (1.702 m)   SpO2 94%   BMI 36.65 kg/m²     General: No distress  Eyes: Anicteric sclerae  HENT: Atraumatic  Neck: Supple  Respiratory: Normal respiratory effort  CV: No peripheral edema  GI: nondistended  Skin: No rashes, ecchymoses, or petechiae  Psych: Alert, oriented, appropriate affect, normal judgment/insight  Breast: left breast with post lumpectomy and radiation changes      Results:     Lab Results   Component Value Date/Time    WBC 5.0 07/07/2020 10:16 AM    HGB 11.3 (L) 07/07/2020 10:16 AM    HCT 32.6 (L) 07/07/2020 10:16 AM    PLATELET 714 02/28/9305 10:16 AM    MCV 90.8 07/07/2020 10:16 AM    ABS.  NEUTROPHILS 2.7 07/07/2020 10:16 AM     Lab Results   Component Value Date/Time    Sodium 139 01/19/2021 11:36 AM    Potassium 3.9 01/19/2021 11:36 AM    Chloride 108 01/19/2021 11:36 AM    CO2 29 01/19/2021 11:36 AM    Glucose 100 01/19/2021 11:36 AM    BUN 10 01/19/2021 11:36 AM    Creatinine 1.00 01/19/2021 11:36 AM    GFR est AA >60 01/19/2021 11:36 AM    GFR est non-AA 55 (L) 01/19/2021 11:36 AM    Calcium 9.0 01/19/2021 11:36 AM     Lab Results   Component Value Date/Time    Bilirubin, total 0.5 01/19/2021 11:36 AM    ALT (SGPT) 23 01/19/2021 11:36 AM    Alk. phosphatase 109 01/19/2021 11:36 AM    Protein, total 7.2 01/19/2021 11:36 AM    Albumin 3.4 (L) 01/19/2021 11:36 AM    Globulin 3.8 01/19/2021 11:36 AM       2/11/20 MRI breast  Left:  11 mm mass at 3:00  Right:  9:00, 5 mm mass  No LAD    4/20/20 TTE EF 63%    9/8/20 dexa  L1-4 T 0.4  L fem neck T 0.3  R fem neck T -0.4    Records reviewed and summarized above. Pathology report(s) reviewed above. Radiology report(s) reviewed above. Assessment/plan:   1. Left breast IDC, 1.1 cm, 0/2 LN, ER +, MI negative, HER 2 POSITIVE, gr 3:  Stage IA (both anatomic and prognostic)    We explained to the patient that the goal of systemic adjuvant therapy is to improve the chances for cure and decrease the risk of relapse. We explained why a patient can have microscopic cancer spread now even though physical examination, laboratory studies and imaging studies are negative for cancer. We explained that the same treatments used now as adjuvant or preventive treatments rarely if ever are curative in women who develop metastases. Using eprognosis. org, her 5 year all cause mortality risk is only 6-8%; her 10 year all cause mortality risk is 15-23%; her median OS is 17-21 years. An adjuvant conversation is warranted. TTE on 4/20/2020, EF 63%, TTE on 7/13/2020, 63%, 7% decline in GLS, TTE on 9/9/2020, EF 61% with 7.7% decline in GLS since 7/2020 and 14.7% decline in GLS since 4/2020. Repeat TTE on 10/16/2020, EF 62%, GLS 17.7%, stable since last TTE, TTE on 12/11/2020, EF 64%, GLS stable; 3/12/21, TTE EF 61%, GLS stable; Repeat TTE 10/27/21 EF 61% with stable GLS. Completed outback Herceptin 3/23/21. Patient is agreeable to Anastrozole 1 mg daily, rx in. Completed xrt on 9/8/2020, started anastrozole on 9/16/2020, HARSH. Last DEXA 9/2020, next due 9/2022, ordered and will fax to [facility].      No indication for neratinib    3/2022 normal mammogram with Dr. Hector Ortega, breast MRI due 8/2022.     2. Right breast ILC, 7 mm, ER +, W[place] +, HER 2 negative, gr 1, 0/8 LN, stage IA both anatomic and prognostic    We explained to the patient that the goal of systemic adjuvant therapy is to improve the chances for cure and decrease the risk of relapse. We explained why a patient can have microscopic cancer spread now even though physical examination, laboratory studies and imaging studies are negative for cancer. We explained that the same treatments used now as adjuvant or preventive treatments rarely if ever are curative in women who develop metastases. Recommend endocrine therapy, see #1 above. While she does not quite meet the criteria on this side for CALGB 9343 (74 yo instead of 70), she does meet KIT guidelines for possibly avoiding XRT on this side, and did so. This is a separate cancer    3. Emotional well being:  She has excellent support and is coping well with her disease    4. BRCA2 and RAD50 VUS:  Not clinically significant at this time    5. Obesity:  Will monitor    6. 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 Gr 1. Started Cymbalta with no improvement. Now taking gabapentin 100 mg BID, prescribed by PCP. Stable. 9. Decrease in GLS:  14.7% decline in GLS since 4/2020. LVEF stable. ProBNP and Trop normal.  Taking Coreg 6.25 BID and following with Dr. Kathleen Emmanuel. Repeat TTE on 10/16/2020 and 12/11/20 and 3/12/21 stable; 6 month post therapy ECHO 10/27/21 stable. 10.  HM:  Colonoscopy in 10/2020. She has received three doses of the COVID-19 vaccine. 11. DM2:  On metformin    12. HTN: slightly elevated today. I saw and evaluated the patient on 4/19/22 and discussed care with collaborating provider, Dr. Kevin Acosta. This signature serves as Brittany Lassiter's attestation. I personally saw and evaluated the patient and performed the entire medical decision making.   The history, physical exam, and documentation were performed by Crow Hermosillo NP. I reviewed and verified the above documentation and modified it as needed. Bilateral breast cancer, ER +, on anastrozole, HARSH. Mammogram negative in march, MRI with Dr. Chetan Spicer in Aug.  Ordered dexa for Sept.  Continue anastrozole and coreg. On gabapentin for neuropathy. RTC 6 months      I appreciate the opportunity to participate in Ms. Kelly sawyer. Signed By: Federico Yip MD      No orders of the defined types were placed in this encounter.

## 2022-04-19 NOTE — PROGRESS NOTES
Jasmyne Kerr is a 70 y.o. female Follow up for the evaluation of breast cancer. 1. Have you been to the ER, urgent care clinic since your last visit? Hospitalized since your last visit? No    2. Have you seen or consulted any other health care providers outside of the [address] [place] [address] since your last visit? Include any pap smears or colon screening.  No

## 2022-05-26 RX ORDER — CARVEDILOL 6.25 MG/1
TABLET ORAL
Qty: 60 TABLET | Refills: 3 | Status: SHIPPED | OUTPATIENT
Start: 2022-05-26 | End: 2022-08-23

## 2022-08-23 RX ORDER — CARVEDILOL 6.25 MG/1
TABLET ORAL
Qty: 60 TABLET | Refills: 3 | Status: SHIPPED | OUTPATIENT
Start: 2022-08-23

## 2022-08-23 RX ORDER — CARVEDILOL 6.25 MG/1
TABLET ORAL
Qty: 60 TABLET | Refills: 3 | OUTPATIENT
Start: 2022-08-23

## 2023-01-06 RX ORDER — CARVEDILOL 6.25 MG/1
TABLET ORAL
Qty: 60 TABLET | Refills: 3 | Status: SHIPPED | OUTPATIENT
Start: 2023-01-06

## 2023-01-11 ENCOUNTER — OFFICE VISIT (OUTPATIENT)
Dept: ONCOLOGY | Age: 73
End: 2023-01-11
Payer: MEDICARE

## 2023-01-11 VITALS
HEART RATE: 70 BPM | DIASTOLIC BLOOD PRESSURE: 81 MMHG | RESPIRATION RATE: 19 BRPM | SYSTOLIC BLOOD PRESSURE: 169 MMHG | OXYGEN SATURATION: 95 % | TEMPERATURE: 97 F | WEIGHT: 220 LBS | BODY MASS INDEX: 34.53 KG/M2 | HEIGHT: 67 IN

## 2023-01-11 DIAGNOSIS — C50.911 BILATERAL MALIGNANT NEOPLASM OF BREAST IN FEMALE, ESTROGEN RECEPTOR POSITIVE, UNSPECIFIED SITE OF BREAST (HCC): Primary | ICD-10-CM

## 2023-01-11 DIAGNOSIS — Z79.811 AROMATASE INHIBITOR USE: ICD-10-CM

## 2023-01-11 DIAGNOSIS — C50.912 BILATERAL MALIGNANT NEOPLASM OF BREAST IN FEMALE, ESTROGEN RECEPTOR POSITIVE, UNSPECIFIED SITE OF BREAST (HCC): Primary | ICD-10-CM

## 2023-01-11 DIAGNOSIS — E11.9 TYPE 2 DIABETES MELLITUS WITHOUT COMPLICATION, WITHOUT LONG-TERM CURRENT USE OF INSULIN (HCC): ICD-10-CM

## 2023-01-11 DIAGNOSIS — G62.0 NEUROPATHY DUE TO CHEMOTHERAPEUTIC DRUG (HCC): ICD-10-CM

## 2023-01-11 DIAGNOSIS — T45.1X5A NEUROPATHY DUE TO CHEMOTHERAPEUTIC DRUG (HCC): ICD-10-CM

## 2023-01-11 DIAGNOSIS — Z17.0 BILATERAL MALIGNANT NEOPLASM OF BREAST IN FEMALE, ESTROGEN RECEPTOR POSITIVE, UNSPECIFIED SITE OF BREAST (HCC): Primary | ICD-10-CM

## 2023-01-11 PROCEDURE — 1123F ACP DISCUSS/DSCN MKR DOCD: CPT | Performed by: INTERNAL MEDICINE

## 2023-01-11 PROCEDURE — 2022F DILAT RTA XM EVC RTNOPTHY: CPT | Performed by: INTERNAL MEDICINE

## 2023-01-11 PROCEDURE — G8417 CALC BMI ABV UP PARAM F/U: HCPCS | Performed by: INTERNAL MEDICINE

## 2023-01-11 PROCEDURE — G8536 NO DOC ELDER MAL SCRN: HCPCS | Performed by: INTERNAL MEDICINE

## 2023-01-11 PROCEDURE — G8432 DEP SCR NOT DOC, RNG: HCPCS | Performed by: INTERNAL MEDICINE

## 2023-01-11 PROCEDURE — 3046F HEMOGLOBIN A1C LEVEL >9.0%: CPT | Performed by: INTERNAL MEDICINE

## 2023-01-11 PROCEDURE — G8427 DOCREV CUR MEDS BY ELIG CLIN: HCPCS | Performed by: INTERNAL MEDICINE

## 2023-01-11 PROCEDURE — 1090F PRES/ABSN URINE INCON ASSESS: CPT | Performed by: INTERNAL MEDICINE

## 2023-01-11 PROCEDURE — 99214 OFFICE O/P EST MOD 30 MIN: CPT | Performed by: INTERNAL MEDICINE

## 2023-01-11 PROCEDURE — 3017F COLORECTAL CA SCREEN DOC REV: CPT | Performed by: INTERNAL MEDICINE

## 2023-01-11 PROCEDURE — G0463 HOSPITAL OUTPT CLINIC VISIT: HCPCS | Performed by: INTERNAL MEDICINE

## 2023-01-11 PROCEDURE — G8400 PT W/DXA NO RESULTS DOC: HCPCS | Performed by: INTERNAL MEDICINE

## 2023-01-11 PROCEDURE — 1101F PT FALLS ASSESS-DOCD LE1/YR: CPT | Performed by: INTERNAL MEDICINE

## 2023-01-11 NOTE — PROGRESS NOTES
Cancer [place] at [place] [address] [facility]  Geraldo Menezes: [phone]  F: [phone]      Reason for Visit:   Burke Das is a 67 y.o. female who is seen in follow up for breast cancer. Breast Surgeon: Dr. Trejo Officer onc:  Dr. Bong Ocasio    Treatment History:   1/24/20 right breast mass:  9:00 core bx, IMC, gr 1, 5 mm, ER + at 99%, MT + at 97%, HER 2 negative at IHC 0, ki67 8%; left breast mass: 3:00 core bx, IMC, gr 3,  5 mm, ER + at 98%, MT negative, HER 2 POSITIVE at [facility] 3+  invitae genetic testing:  BRCA2 and RAD50 VUS  3/23/20 right breast lumpectomy:  ILC, 7 mm, gr 1, no LVI, 0/8 LN, pT1b pN0 cMo; left breast lumpectomy:  IDC, 1.1 cm, gr 3, 0/2 LN, DCIS present, no LVI, pT1c pN0 cM0  Paclitaxel/trastuzumab 4/21/20-7/7/2020  Outback Herceptin 7/14/2020-3/23/21  XRT to left breast 8/5/2020-9/8/2020  Anastrozole 9/16/2020 - current    History of Present Illness: An abnormal mammogram led to the pathology above    Interval history:  In today for follow up on anastrozole. Reports no complaints today. FH:  No breast or ovarian cancer, no prostate or pancreas cancer    Past Medical History:   Diagnosis Date    Breast cancer ([address] 04/14/2020    Cancer ([facility] [address]       Past Surgical History:   Procedure Laterality Date    HX HYSTERECTOMY  1980    HX KNEE REPLACEMENT  2010    HX SHOULDER REPLACEMENT Right 2010      Social History     Tobacco Use    Smoking status: Never     Passive exposure: Never    Smokeless tobacco: Never   Substance Use Topics    Alcohol use: Not Currently      History reviewed. No pertinent family history. Current Outpatient Medications   Medication Sig    carvediloL (COREG) 6.25 mg tablet TAKE 1 TABLET BY MOUTH TWICE DAILY WITH MEALS    anastrozole (ARIMIDEX) 1 mg tablet TAKE 1 TABLET BY MOUTH DAILY    gabapentin (NEURONTIN) 100 mg capsule Take  by mouth three (3) times daily.     metFORMIN (GLUCOPHAGE) 500 mg tablet Take  by mouth two (2) times daily (with meals). ergocalciferol (ERGOCALCIFEROL) 1,250 mcg (50,000 unit) capsule TK 1 C PO 1 TIME A WK    losartan (COZAAR) 50 mg tablet TK 1 T PO QD    omeprazole (PRILOSEC) 20 mg capsule TK 1 C PO QD    rosuvastatin (CRESTOR) 20 mg tablet TK 1 T PO QD    aspirin delayed-release 81 mg tablet Take  by mouth daily. No current facility-administered medications for this visit. No Known Allergies     Review of Systems: A complete review of systems was obtained, negative except as described above. Physical Exam:     Visit Vitals  BP (!) 169/81 (BP 1 Location: Left upper arm, BP Patient Position: Sitting, BP Cuff Size: Adult)   Pulse 70   Temp 97 °F (36.1 °C) (Temporal)   Resp 19   Ht 5' 7\" (1.702 m)   Wt 220 lb (99.8 kg)   SpO2 95%   BMI 34.46 kg/m²     General: No distress  Eyes: Anicteric sclerae  HENT: Atraumatic  Neck: Supple  Respiratory: Normal respiratory effort  CV: No peripheral edema  GI: nondistended  Skin: No rashes, ecchymoses, or petechiae  Psych: Alert, oriented, appropriate affect, normal judgment/insight        Results:     Lab Results   Component Value Date/Time    WBC 5.0 07/07/2020 10:16 AM    HGB 11.3 (L) 07/07/2020 10:16 AM    HCT 32.6 (L) 07/07/2020 10:16 AM    PLATELET 426 59/77/2227 10:16 AM    MCV 90.8 07/07/2020 10:16 AM    ABS. NEUTROPHILS 2.7 07/07/2020 10:16 AM     Lab Results   Component Value Date/Time    Sodium 139 01/19/2021 11:36 AM    Potassium 3.9 01/19/2021 11:36 AM    Chloride 108 01/19/2021 11:36 AM    CO2 29 01/19/2021 11:36 AM    Glucose 100 01/19/2021 11:36 AM    BUN 10 01/19/2021 11:36 AM    Creatinine 1.00 01/19/2021 11:36 AM    GFR est AA >60 01/19/2021 11:36 AM    GFR est non-AA 55 (L) 01/19/2021 11:36 AM    Calcium 9.0 01/19/2021 11:36 AM     Lab Results   Component Value Date/Time    Bilirubin, total 0.5 01/19/2021 11:36 AM    ALT (SGPT) 23 01/19/2021 11:36 AM    Alk.  phosphatase 109 01/19/2021 11:36 AM    Protein, total 7.2 01/19/2021 11:36 AM    Albumin 3.4 (L) 01/19/2021 11:36 AM    Globulin 3.8 01/19/2021 11:36 AM       2/11/20 MRI breast  Left:  11 mm mass at 3:00  Right:  9:00, 5 mm mass  No LAD    4/20/20 TTE EF 63%    9/8/20 dexa  L1-4 T 0.4  L fem neck T 0.3  R fem neck T -0.4    Records reviewed and summarized above. Pathology report(s) reviewed above. Radiology report(s) reviewed above. Assessment/plan:   1. Left breast IDC, 1.1 cm, 0/2 LN, ER +, HI negative, HER 2 POSITIVE, gr 3:  Stage IA (both anatomic and prognostic)    We explained to the patient that the goal of systemic adjuvant therapy is to improve the chances for cure and decrease the risk of relapse. We explained why a patient can have microscopic cancer spread now even though physical examination, laboratory studies and imaging studies are negative for cancer. We explained that the same treatments used now as adjuvant or preventive treatments rarely if ever are curative in women who develop metastases. Using eprognosis. org, her 5 year all cause mortality risk is only 6-8%; her 10 year all cause mortality risk is 15-23%; her median OS is 17-21 years. An adjuvant conversation is warranted. TTE on 4/20/2020, EF 63%, TTE on 7/13/2020, 63%, 7% decline in GLS, TTE on 9/9/2020, EF 61% with 7.7% decline in GLS since 7/2020 and 14.7% decline in GLS since 4/2020. Repeat TTE on 10/16/2020, EF 62%, GLS 17.7%, stable since last TTE, TTE on 12/11/2020, EF 64%, GLS stable; 3/12/21, TTE EF 61%, GLS stable; Repeat TTE 10/27/21 EF 61% with stable GLS. Completed outback Herceptin 3/23/21. Patient is agreeable to Anastrozole 1 mg daily, rx in. Completed xrt on 9/8/2020, Started anastrozole on 9/16/2020, HARSH. Last DEXA 9/2020, next due 9/2022, ordered and faxed to [facility]. This was not performed. No indication for neratinib    3/2022 normal mammogram with Dr. Merced Ocasio, breast MRI performed 8/2022. Repeat mammogram due 3/2023 with Dr. Merced Ocasio.      2. Right breast ILC, 7 mm, ER +, [place] +, HER 2 negative, gr 1, 0/8 LN, stage IA both anatomic and prognostic    We explained to the patient that the goal of systemic adjuvant therapy is to improve the chances for cure and decrease the risk of relapse. We explained why a patient can have microscopic cancer spread now even though physical examination, laboratory studies and imaging studies are negative for cancer. We explained that the same treatments used now as adjuvant or preventive treatments rarely if ever are curative in women who develop metastases. Recommend endocrine therapy, see #1 above. While she does not quite meet the criteria on this side for CALGB 9343 (76 yo instead of 70), she does meet KIT guidelines for possibly avoiding XRT on this side, and did so (and Prime II data). This is a separate cancer    3. Emotional well being:  She has excellent support and is coping well with her disease    4. BRCA2 and RAD50 VUS:  Not clinically significant at this time    5. Obesity:  Will monitor    6. 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 Gr 1. Started Cymbalta with no improvement. Now taking gabapentin 100 mg BID, prescribed by PCP. Stable. 9. Decrease in GLS:  14.7% decline in GLS since 4/2020. LVEF stable. ProBNP and Trop normal.  Taking Coreg 6.25 BID and following with Dr. Ricardo Client. Repeat TTE on 10/16/2020 and 12/11/20 and 3/12/21 stable; 6 month post therapy ECHO 10/27/21 stable. 10.  HM:  Colonoscopy in 10/2020. She has received three doses of the COVID-19 vaccine. 11. DM2:  On metformin    12. HTN: on losartan and coreg,  elevated today. Recommend home BP log. I saw and evaluated the patient on 1/11/23 and discussed care with collaborating provider, Dr. Gary Cervantes. This signature serves as Brittany Lassiter's attestation. I personally saw and evaluated the patient and performed the entire medical decision making.   The history, physical exam, and documentation were performed by Mary Salcedo NP. I reviewed and verified the above documentation and modified it as needed. Bilateral breast cancer ER +, on anastrozole, HARSH, continue. Will re-fax dexa to [facility]. Discussed checking bp at home. On gabapentin for neuropathy. Continue. Mammogram in march with Dr. Carolyn Haddad. RTC 1 year    I appreciate the opportunity to participate in Ms. Viki sawyer. Signed By: Clare Blas MD      No orders of the defined types were placed in this encounter.

## 2023-01-11 NOTE — PROGRESS NOTES
Identified pt with two pt identifiers(name and ). Reviewed record in preparation for visit and have obtained necessary documentation. Chief Complaint   Patient presents with    Breast Cancer      Vitals:    23 0904   BP: (!) 169/81   Pulse: 70   Resp: 19   Temp: 97 °F (36.1 °C)   TempSrc: Temporal   SpO2: 95%   Weight: 220 lb (99.8 kg)   Height: 5' 7\" (1.702 m)   PainSc:   0 - No pain       Health Maintenance Review: Patient reminded of \"due or due soon\" health maintenance. I have asked the patient to contact his/her primary care provider (PCP) for follow-up on his/her health maintenance. Immunization History   Administered Date(s) Administered    COVID-19, MODERNA BLUE border, Primary or Immunocompromised, (age 18y+), IM, 100 mcg/0.5mL 2021, 2021    Influenza Vaccine 10/16/2019, 2020    Pneumococcal Vaccine (Unspecified Type) 2015       Current Outpatient Medications   Medication Instructions    anastrozole (ARIMIDEX) 1 mg tablet TAKE 1 TABLET BY MOUTH DAILY    aspirin delayed-release 81 mg tablet Oral, DAILY    carvediloL (COREG) 6.25 mg tablet TAKE 1 TABLET BY MOUTH TWICE DAILY WITH MEALS    ergocalciferol (ERGOCALCIFEROL) 1,250 mcg (50,000 unit) capsule TK 1 C PO 1 TIME A WK    gabapentin (NEURONTIN) 100 mg capsule Oral, 3 TIMES DAILY    losartan (COZAAR) 50 mg tablet TK 1 T PO QD    metFORMIN (GLUCOPHAGE) 500 mg tablet Oral, 2 TIMES DAILY WITH MEALS    omeprazole (PRILOSEC) 20 mg capsule TK 1 C PO QD    rosuvastatin (CRESTOR) 20 mg tablet TK 1 T PO QD       No Known Allergies    Immunization History   Administered Date(s) Administered    COVID-19, MODERNA BLUE border, Primary or Immunocompromised, (age 18y+), IM, 100 mcg/0.5mL 2021, 2021    Influenza Vaccine 10/16/2019, 2020    Pneumococcal Vaccine (Unspecified Type) 2015       Past Medical History:   Diagnosis Date    Breast cancer ([facility] [address] 2020    Cancer ([facility] [address]            1.  \"Have you been to the ER, urgent care clinic since your last visit? Hospitalized since your last visit? \" No    2. \"Have you seen or consulted any other health care providers outside of the [address] [place] [address] since your last visit? \" No

## 2023-02-22 DIAGNOSIS — Z17.0 BILATERAL MALIGNANT NEOPLASM OF BREAST IN FEMALE, ESTROGEN RECEPTOR POSITIVE, UNSPECIFIED SITE OF BREAST (HCC): ICD-10-CM

## 2023-02-22 DIAGNOSIS — C50.912 BILATERAL MALIGNANT NEOPLASM OF BREAST IN FEMALE, ESTROGEN RECEPTOR POSITIVE, UNSPECIFIED SITE OF BREAST (HCC): ICD-10-CM

## 2023-02-22 DIAGNOSIS — Z51.81 ENCOUNTER FOR MONITORING CARDIOTOXIC DRUG THERAPY: ICD-10-CM

## 2023-02-22 DIAGNOSIS — Z79.899 ENCOUNTER FOR MONITORING CARDIOTOXIC DRUG THERAPY: ICD-10-CM

## 2023-02-22 DIAGNOSIS — C50.911 BILATERAL MALIGNANT NEOPLASM OF BREAST IN FEMALE, ESTROGEN RECEPTOR POSITIVE, UNSPECIFIED SITE OF BREAST (HCC): ICD-10-CM

## 2023-02-22 RX ORDER — ANASTROZOLE 1 MG/1
TABLET ORAL
Qty: 90 TABLET | Refills: 3 | Status: SHIPPED | OUTPATIENT
Start: 2023-02-22

## 2023-05-05 RX ORDER — CARVEDILOL 6.25 MG/1
TABLET ORAL
Qty: 60 TABLET | Refills: 3 | Status: SHIPPED | OUTPATIENT
Start: 2023-05-05

## 2023-09-20 RX ORDER — CARVEDILOL 6.25 MG/1
6.25 TABLET ORAL 2 TIMES DAILY WITH MEALS
Qty: 60 TABLET | Refills: 5 | Status: SHIPPED | OUTPATIENT
Start: 2023-09-20

## 2024-01-10 ENCOUNTER — OFFICE VISIT (OUTPATIENT)
Age: 74
End: 2024-01-10
Payer: MEDICARE

## 2024-01-10 VITALS
HEART RATE: 72 BPM | OXYGEN SATURATION: 96 % | BODY MASS INDEX: 34.53 KG/M2 | RESPIRATION RATE: 17 BRPM | HEIGHT: 67 IN | SYSTOLIC BLOOD PRESSURE: 158 MMHG | DIASTOLIC BLOOD PRESSURE: 88 MMHG | WEIGHT: 220 LBS

## 2024-01-10 DIAGNOSIS — E11.9 TYPE 2 DIABETES MELLITUS WITHOUT COMPLICATION, WITHOUT LONG-TERM CURRENT USE OF INSULIN (HCC): ICD-10-CM

## 2024-01-10 DIAGNOSIS — C50.911 MALIGNANT NEOPLASM OF RIGHT BREAST IN FEMALE, ESTROGEN RECEPTOR POSITIVE, UNSPECIFIED SITE OF BREAST (HCC): Primary | ICD-10-CM

## 2024-01-10 DIAGNOSIS — Z17.0 MALIGNANT NEOPLASM OF RIGHT BREAST IN FEMALE, ESTROGEN RECEPTOR POSITIVE, UNSPECIFIED SITE OF BREAST (HCC): Primary | ICD-10-CM

## 2024-01-10 DIAGNOSIS — Z17.0 MALIGNANT NEOPLASM OF LEFT BREAST IN FEMALE, ESTROGEN RECEPTOR POSITIVE, UNSPECIFIED SITE OF BREAST (HCC): ICD-10-CM

## 2024-01-10 DIAGNOSIS — Z78.0 POST-MENOPAUSAL: ICD-10-CM

## 2024-01-10 DIAGNOSIS — C50.912 MALIGNANT NEOPLASM OF LEFT BREAST IN FEMALE, ESTROGEN RECEPTOR POSITIVE, UNSPECIFIED SITE OF BREAST (HCC): ICD-10-CM

## 2024-01-10 PROCEDURE — 1090F PRES/ABSN URINE INCON ASSESS: CPT | Performed by: INTERNAL MEDICINE

## 2024-01-10 PROCEDURE — G8484 FLU IMMUNIZE NO ADMIN: HCPCS | Performed by: INTERNAL MEDICINE

## 2024-01-10 PROCEDURE — 99213 OFFICE O/P EST LOW 20 MIN: CPT | Performed by: INTERNAL MEDICINE

## 2024-01-10 PROCEDURE — G8427 DOCREV CUR MEDS BY ELIG CLIN: HCPCS | Performed by: INTERNAL MEDICINE

## 2024-01-10 PROCEDURE — 3017F COLORECTAL CA SCREEN DOC REV: CPT | Performed by: INTERNAL MEDICINE

## 2024-01-10 PROCEDURE — G8419 CALC BMI OUT NRM PARAM NOF/U: HCPCS | Performed by: INTERNAL MEDICINE

## 2024-01-10 PROCEDURE — G8400 PT W/DXA NO RESULTS DOC: HCPCS | Performed by: INTERNAL MEDICINE

## 2024-01-10 PROCEDURE — 1036F TOBACCO NON-USER: CPT | Performed by: INTERNAL MEDICINE

## 2024-01-10 PROCEDURE — 1123F ACP DISCUSS/DSCN MKR DOCD: CPT | Performed by: INTERNAL MEDICINE

## 2024-01-10 PROCEDURE — 3046F HEMOGLOBIN A1C LEVEL >9.0%: CPT | Performed by: INTERNAL MEDICINE

## 2024-01-10 PROCEDURE — 2022F DILAT RTA XM EVC RTNOPTHY: CPT | Performed by: INTERNAL MEDICINE

## 2024-01-10 RX ORDER — ANASTROZOLE 1 MG/1
1 TABLET ORAL DAILY
Qty: 90 TABLET | Refills: 3 | Status: SHIPPED | OUTPATIENT
Start: 2024-01-10

## 2024-01-10 NOTE — PROGRESS NOTES
Jacquie Ring is a 73 y.o. female  Chief Complaint   Patient presents with    Follow-up     Malignant neoplasm of unspecified site of right female breas     1. Have you been to the ER, urgent care clinic since your last visit?  Hospitalized since your last visit?No    2. Have you seen or consulted any other health care providers outside of the [facility] since your last visit?  Include any pap smears or colon screening. No

## 2024-01-10 NOTE — PROGRESS NOTES
Cancer [place] at [facility]   [address], [address] [facility] [address]   W: [phone]  F: [phone]         Reason for Visit:     Jacquie Ring is a 73 y.o. female who is seen in follow up for breast cancer.      Breast Surgeon: Dr. Mercado   Rad onc:  Dr. Santillan          Treatment History:      1/24/20 right breast mass:  9:00 core bx, IMC, gr 1, 5 mm, ER + at 99%, NC + at 97%, HER 2 negative at IHC 0, ki67 8%; left breast mass:  3:00 core bx, IMC, gr 3,  5 mm, ER + at 98%, NC negative, HER 2 POSITIVE at IHC 3+    invitae genetic testing:  BRCA2 and RAD50 VUS    3/23/20 right breast lumpectomy:  ILC, 7 mm, gr 1, no LVI, 0/8 LN, pT1b pN0 cMo; left breast lumpectomy:  IDC, 1.1 cm, gr 3, 0/2 LN, DCIS present, no LVI, pT1c pN0 cM0    Paclitaxel/trastuzumab 4/21/20-7/7/2020    Outback Herceptin 7/14/2020-3/23/21    XRT to left breast 8/5/2020-9/8/2020    Anastrozole 9/16/2020 - current          History of Present Illness:     An abnormal mammogram led to the pathology above      Interval history:  In today for follow up on anastrozole. Reports no symptoms today.       FH:  No breast or ovarian cancer, no prostate or pancreas cancer      Review of systems was obtained and pertinent findings reviewed above. Past medical history, social history, family history, medications, and allergies are located in the electronic medical record.       Physical Exam:        Visit Vitals  Vitals:    01/10/24 0846   BP: (!) 158/88   Pulse: 72   Resp: 17   SpO2: 96%      General: No distress   Eyes:  Anicteric sclerae   HENT: Atraumatic   Neck: Supple   Respiratory: Normal respiratory effort   CV: No peripheral edema   GI: nondistended   Skin: No rashes, ecchymoses, or petechiae   Psych: Alert, oriented, appropriate affect, normal judgment/insight             Results:          Lab Results   Component Value Date    WBC 5.0 07/07/2020    HGB 11.3 (L) 07/07/2020    HCT 32.6 (L) 07/07/2020

## 2024-04-03 ENCOUNTER — TELEPHONE (OUTPATIENT)
Age: 74
End: 2024-04-03

## 2024-04-03 NOTE — TELEPHONE ENCOUNTER
Patient called and stated that she got her bone density scan done at [facility] last Thursday and was told to call our office to let the doctor know once she got it done.       # [phone]

## 2024-04-04 NOTE — TELEPHONE ENCOUNTER
[facility] [place] at [facility]  [phone]    04/04/24 8:58 AM EDT - Called patient back to let her know that we were able to get the report for the Dexa scan. Informed her we would have Dr. Smith take a look at it and then we would get back to her with any recommendations.

## 2024-04-04 NOTE — TELEPHONE ENCOUNTER
[facility] [place] at [facility]  [phone]    04/04/24 10:32 AM EDT - Called patient and let her know that her DEXA was normal per Dr. Smith. Patient had no further questions at this time.

## 2024-10-24 DIAGNOSIS — C50.911 MALIGNANT NEOPLASM OF RIGHT BREAST IN FEMALE, ESTROGEN RECEPTOR POSITIVE, UNSPECIFIED SITE OF BREAST (HCC): ICD-10-CM

## 2024-10-24 DIAGNOSIS — Z17.0 MALIGNANT NEOPLASM OF RIGHT BREAST IN FEMALE, ESTROGEN RECEPTOR POSITIVE, UNSPECIFIED SITE OF BREAST (HCC): ICD-10-CM

## 2024-10-24 RX ORDER — ANASTROZOLE 1 MG/1
1 TABLET ORAL DAILY
Qty: 90 TABLET | Refills: 3 | Status: SHIPPED | OUTPATIENT
Start: 2024-10-24

## 2025-01-07 ENCOUNTER — TELEPHONE (OUTPATIENT)
Age: 75
End: 2025-01-07

## 2025-01-07 NOTE — TELEPHONE ENCOUNTER
Patient called and stated that she needed to reschedule her appt for tomorrow due to the weather. PT is rescheduled to 1/28 at 8:30

## 2025-01-20 DIAGNOSIS — Z78.0 POST-MENOPAUSAL: Primary | ICD-10-CM

## 2025-01-28 ENCOUNTER — OFFICE VISIT (OUTPATIENT)
Age: 75
End: 2025-01-28
Payer: MEDICARE

## 2025-01-28 VITALS
WEIGHT: 218 LBS | HEIGHT: 67 IN | RESPIRATION RATE: 17 BRPM | HEART RATE: 65 BPM | TEMPERATURE: 97.5 F | BODY MASS INDEX: 34.21 KG/M2 | SYSTOLIC BLOOD PRESSURE: 108 MMHG | DIASTOLIC BLOOD PRESSURE: 71 MMHG | OXYGEN SATURATION: 99 %

## 2025-01-28 DIAGNOSIS — Z17.0 MALIGNANT NEOPLASM OF RIGHT BREAST IN FEMALE, ESTROGEN RECEPTOR POSITIVE, UNSPECIFIED SITE OF BREAST (HCC): Primary | ICD-10-CM

## 2025-01-28 DIAGNOSIS — C50.911 MALIGNANT NEOPLASM OF RIGHT BREAST IN FEMALE, ESTROGEN RECEPTOR POSITIVE, UNSPECIFIED SITE OF BREAST (HCC): Primary | ICD-10-CM

## 2025-01-28 PROCEDURE — 1090F PRES/ABSN URINE INCON ASSESS: CPT | Performed by: NURSE PRACTITIONER

## 2025-01-28 PROCEDURE — G8427 DOCREV CUR MEDS BY ELIG CLIN: HCPCS | Performed by: NURSE PRACTITIONER

## 2025-01-28 PROCEDURE — 1159F MED LIST DOCD IN RCRD: CPT | Performed by: NURSE PRACTITIONER

## 2025-01-28 PROCEDURE — 1123F ACP DISCUSS/DSCN MKR DOCD: CPT | Performed by: NURSE PRACTITIONER

## 2025-01-28 PROCEDURE — G8400 PT W/DXA NO RESULTS DOC: HCPCS | Performed by: NURSE PRACTITIONER

## 2025-01-28 PROCEDURE — 3017F COLORECTAL CA SCREEN DOC REV: CPT | Performed by: NURSE PRACTITIONER

## 2025-01-28 PROCEDURE — G2211 COMPLEX E/M VISIT ADD ON: HCPCS | Performed by: NURSE PRACTITIONER

## 2025-01-28 PROCEDURE — 1036F TOBACCO NON-USER: CPT | Performed by: NURSE PRACTITIONER

## 2025-01-28 PROCEDURE — 99213 OFFICE O/P EST LOW 20 MIN: CPT | Performed by: NURSE PRACTITIONER

## 2025-01-28 PROCEDURE — 1126F AMNT PAIN NOTED NONE PRSNT: CPT | Performed by: NURSE PRACTITIONER

## 2025-01-28 PROCEDURE — G8417 CALC BMI ABV UP PARAM F/U: HCPCS | Performed by: NURSE PRACTITIONER

## 2025-01-28 RX ORDER — ANASTROZOLE 1 MG/1
1 TABLET ORAL DAILY
Qty: 90 TABLET | Refills: 3 | Status: SHIPPED | OUTPATIENT
Start: 2025-01-28

## 2025-01-28 ASSESSMENT — PATIENT HEALTH QUESTIONNAIRE - PHQ9
SUM OF ALL RESPONSES TO PHQ QUESTIONS 1-9: 0
2. FEELING DOWN, DEPRESSED OR HOPELESS: NOT AT ALL
SUM OF ALL RESPONSES TO PHQ QUESTIONS 1-9: 0
1. LITTLE INTEREST OR PLEASURE IN DOING THINGS: NOT AT ALL
SUM OF ALL RESPONSES TO PHQ9 QUESTIONS 1 & 2: 0
SUM OF ALL RESPONSES TO PHQ QUESTIONS 1-9: 0
SUM OF ALL RESPONSES TO PHQ QUESTIONS 1-9: 0

## 2025-01-28 NOTE — PROGRESS NOTES
Jacquie Ring is a 74 y.o. female is here today for a breast cancer follow up.    1. Have you been to the ER, urgent care clinic since your last visit?  Hospitalized since your last visit?No    2. Have you seen or consulted any other health care providers outside of the [facility] since your last visit?  Include any pap smears or colon screening. No

## 2025-01-28 NOTE — PROGRESS NOTES
[facility] [facility]   [address] [address], [address] [facility] [address]   W: [phone]  F: [phone]         Reason for Visit:     Jacquie Ring is a 74 y.o. female who is seen in follow up for breast cancer.      Breast Surgeon: Dr. Mercado   Rad onc:  Dr. Santillan          Treatment History:      1/24/20 right breast mass:  9:00 core bx, IMC, gr 1, 5 mm, ER + at 99%, WY + at 97%, HER 2 negative at IHC 0, ki67 8%; left breast mass:  3:00 core bx, IMC, gr 3,  5 mm, ER + at 98%, WY negative, HER 2 POSITIVE at IHC 3+    [facility] genetic testing:  BRCA2 and RAD50 VUS    3/23/20 right breast lumpectomy:  ILC, 7 mm, gr 1, no LVI, 0/8 LN, pT1b pN0 cMo; left breast lumpectomy:  IDC, 1.1 cm, gr 3, 0/2 LN, DCIS present, no LVI, pT1c pN0 cM0    Paclitaxel/trastuzumab 4/21/20-7/7/2020    Outback Herceptin 7/14/2020-3/23/21    XRT to left breast 8/5/2020-9/8/2020    Anastrozole 9/16/2020 - current          History of Present Illness:     An abnormal mammogram led to the pathology above      Interval history:  In today for follow up on anastrozole. Reports no symptoms today. Reports no symptoms today.       FH:  No breast or ovarian cancer, no prostate or pancreas cancer      Review of systems was obtained and pertinent findings reviewed above. Past medical history, social history, family history, medications, and allergies are located in the electronic medical record.       Physical Exam:        Visit Vitals  Vitals:    01/28/25 0826   BP: 108/71   Pulse: 65   Resp: 17   Temp: 97.5 °F (36.4 °C)   SpO2: 99%      General: No distress   Eyes:  Anicteric sclerae   HENT: Atraumatic   Neck: Supple   Respiratory: Normal respiratory effort   CV: No peripheral edema   GI: nondistended   Skin: No rashes, ecchymoses, or petechiae   Psych: Alert, oriented, appropriate affect, normal judgment/insight             Results:          Lab Results   Component Value Date    WBC 5.0 07/07/2020    HGB

## 2025-03-13 DIAGNOSIS — Z17.0 MALIGNANT NEOPLASM OF RIGHT BREAST IN FEMALE, ESTROGEN RECEPTOR POSITIVE, UNSPECIFIED SITE OF BREAST: ICD-10-CM

## 2025-03-13 DIAGNOSIS — C50.911 MALIGNANT NEOPLASM OF RIGHT BREAST IN FEMALE, ESTROGEN RECEPTOR POSITIVE, UNSPECIFIED SITE OF BREAST: ICD-10-CM

## 2025-03-17 RX ORDER — ANASTROZOLE 1 MG/1
1 TABLET ORAL DAILY
Qty: 90 TABLET | Refills: 3 | Status: SHIPPED | OUTPATIENT
Start: 2025-03-17